# Patient Record
Sex: MALE | Race: WHITE | NOT HISPANIC OR LATINO | Employment: OTHER | URBAN - METROPOLITAN AREA
[De-identification: names, ages, dates, MRNs, and addresses within clinical notes are randomized per-mention and may not be internally consistent; named-entity substitution may affect disease eponyms.]

---

## 2019-11-26 ENCOUNTER — TELEPHONE (OUTPATIENT)
Dept: GASTROENTEROLOGY | Facility: CLINIC | Age: 59
End: 2019-11-26

## 2019-11-26 NOTE — TELEPHONE ENCOUNTER
Pt's wife Woodrow Hanks states he was seen by [facility] in the hospital; hemorrhoid bled out and he had 3 pints of blood; had colonoscopy; was d/c'd 2 days ago; had blood drawn yesterday  Primary called and said Hgb is 7 3; questioned if he should have more blood drawn?  # [phone]

## 2019-11-26 NOTE — TELEPHONE ENCOUNTER
Spoke with wife  PCP called again since she left us a message and he is being set up for transfusion tomorrow

## 2019-11-27 ENCOUNTER — TELEPHONE (OUTPATIENT)
Dept: GASTROENTEROLOGY | Facility: CLINIC | Age: 59
End: 2019-11-27

## 2019-12-04 ENCOUNTER — TELEPHONE (OUTPATIENT)
Dept: GASTROENTEROLOGY | Facility: CLINIC | Age: 59
End: 2019-12-04

## 2019-12-04 NOTE — TELEPHONE ENCOUNTER
Dr Oconnor Book wants pt to be seen sooner than feb of 2020-reschedule ov to a sooner date  Pt had combo 11/23/19 [facility]

## 2019-12-06 DIAGNOSIS — R18.8 OTHER ASCITES: Primary | ICD-10-CM

## 2019-12-06 RX ORDER — FUROSEMIDE 20 MG/1
40 TABLET ORAL DAILY
Qty: 60 TABLET | Refills: 2 | Status: SHIPPED | OUTPATIENT
Start: 2019-12-06 | End: 2020-02-10 | Stop reason: SDUPTHER

## 2019-12-06 NOTE — TELEPHONE ENCOUNTER
I confirmed that patient is on aldactone   I also confirmed the [facility] in [place] by Gloria Mccloud

## 2019-12-06 NOTE — TELEPHONE ENCOUNTER
Okay to increase to 40 mg of furosemide  My recommendation to the ER was to start him on Aldactone also some make sure he is taking that too   ER note states they gave him a prescription

## 2019-12-06 NOTE — TELEPHONE ENCOUNTER
Pt states Dr Yassine Rene called in water pills; also got from family Dr and they work better  Med is Furosemide - got 20 mg from Dr Yassine Rene & 40 mg from primary/wants 40 mg because they work better; Rx to [facility]    If ques CB# [phone]

## 2019-12-06 NOTE — TELEPHONE ENCOUNTER
Recent [facility] ER visit for ascites/paracentesis records on your desk  ER notes state discussed with you 12/1/19  Patient was discharged on Furosemide 20 mg daily, okay to increase to 40 mg dose as patient requests? Patient is scheduled for follow up with [facility] 1/6/20  Please advise

## 2020-01-03 ENCOUNTER — HOSPITAL ENCOUNTER (OUTPATIENT)
Facility: HOSPITAL | Age: 60
Setting detail: OBSERVATION
Discharge: HOME | End: 2020-01-04
Attending: EMERGENCY MEDICINE | Admitting: INTERNAL MEDICINE
Payer: MEDICARE

## 2020-01-03 DIAGNOSIS — D64.9 ANEMIA: ICD-10-CM

## 2020-01-03 DIAGNOSIS — K64.9 RECTAL VARICES: ICD-10-CM

## 2020-01-03 DIAGNOSIS — D69.6 THROMBOCYTOPENIA (HCC): ICD-10-CM

## 2020-01-03 DIAGNOSIS — K92.2 LOWER GI BLEED: Primary | ICD-10-CM

## 2020-01-03 PROBLEM — K70.9 LIVER DISEASE DUE TO ALCOHOL (HCC): Status: ACTIVE | Noted: 2020-01-03

## 2020-01-03 PROBLEM — R79.89 ELEVATED LFTS: Status: ACTIVE | Noted: 2020-01-03

## 2020-01-03 PROBLEM — I10 HTN (HYPERTENSION): Status: ACTIVE | Noted: 2020-01-03

## 2020-01-03 PROBLEM — F19.20 DRUG ABUSE AND DEPENDENCE (HCC): Status: ACTIVE | Noted: 2020-01-03

## 2020-01-03 PROBLEM — J44.9 COPD (CHRONIC OBSTRUCTIVE PULMONARY DISEASE) (HCC): Status: ACTIVE | Noted: 2020-01-03

## 2020-01-03 PROBLEM — E87.6 HYPOKALEMIA: Status: ACTIVE | Noted: 2020-01-03

## 2020-01-03 PROBLEM — D62 ACUTE BLOOD LOSS ANEMIA: Status: ACTIVE | Noted: 2020-01-03

## 2020-01-03 LAB
ABO GROUP BLD: NORMAL
ALBUMIN SERPL BCP-MCNC: 3.2 G/DL (ref 3.5–5)
ALP SERPL-CCNC: 266 U/L (ref 46–116)
ALT SERPL W P-5'-P-CCNC: 39 U/L (ref 12–78)
ANION GAP SERPL CALCULATED.3IONS-SCNC: 9 MMOL/L (ref 4–13)
APTT PPP: 35 SECONDS (ref 23–37)
AST SERPL W P-5'-P-CCNC: 56 U/L (ref 5–45)
BASOPHILS # BLD AUTO: 0.01 THOUSANDS/ΜL (ref 0–0.1)
BASOPHILS NFR BLD AUTO: 0 % (ref 0–1)
BILIRUB SERPL-MCNC: 0.7 MG/DL (ref 0.2–1)
BLD GP AB SCN SERPL QL: NEGATIVE
BUN SERPL-MCNC: 6 MG/DL (ref 5–25)
CALCIUM SERPL-MCNC: 8.1 MG/DL (ref 8.3–10.1)
CHLORIDE SERPL-SCNC: 101 MMOL/L (ref 100–108)
CO2 SERPL-SCNC: 27 MMOL/L (ref 21–32)
CREAT SERPL-MCNC: 0.89 MG/DL (ref 0.6–1.3)
EOSINOPHIL # BLD AUTO: 0.07 THOUSAND/ΜL (ref 0–0.61)
EOSINOPHIL NFR BLD AUTO: 2 % (ref 0–6)
ERYTHROCYTE [DISTWIDTH] IN BLOOD BY AUTOMATED COUNT: 14 % (ref 11.6–15.1)
GFR SERPL CREATININE-BSD FRML MDRD: 94 ML/MIN/1.73SQ M
GLUCOSE SERPL-MCNC: 157 MG/DL (ref 65–140)
HCT VFR BLD AUTO: 34.9 % (ref 36.5–49.3)
HGB BLD-MCNC: 11.5 G/DL (ref 12–17)
IMM GRANULOCYTES # BLD AUTO: 0.01 THOUSAND/UL (ref 0–0.2)
IMM GRANULOCYTES NFR BLD AUTO: 0 % (ref 0–2)
INR PPP: 1.25 (ref 0.84–1.19)
LIPASE SERPL-CCNC: 133 U/L (ref 73–393)
LYMPHOCYTES # BLD AUTO: 0.62 THOUSANDS/ΜL (ref 0.6–4.47)
LYMPHOCYTES NFR BLD AUTO: 19 % (ref 14–44)
MCH RBC QN AUTO: 30.4 PG (ref 26.8–34.3)
MCHC RBC AUTO-ENTMCNC: 33 G/DL (ref 31.4–37.4)
MCV RBC AUTO: 92 FL (ref 82–98)
MONOCYTES # BLD AUTO: 0.23 THOUSAND/ΜL (ref 0.17–1.22)
MONOCYTES NFR BLD AUTO: 7 % (ref 4–12)
NEUTROPHILS # BLD AUTO: 2.28 THOUSANDS/ΜL (ref 1.85–7.62)
NEUTS SEG NFR BLD AUTO: 72 % (ref 43–75)
NRBC BLD AUTO-RTO: 0 /100 WBCS
PLATELET # BLD AUTO: 56 THOUSANDS/UL (ref 149–390)
PMV BLD AUTO: 11.4 FL (ref 8.9–12.7)
POTASSIUM SERPL-SCNC: 3.2 MMOL/L (ref 3.5–5.3)
PROT SERPL-MCNC: 7.4 G/DL (ref 6.4–8.2)
PROTHROMBIN TIME: 15.4 SECONDS (ref 11.6–14.5)
RBC # BLD AUTO: 3.78 MILLION/UL (ref 3.88–5.62)
RH BLD: POSITIVE
SODIUM SERPL-SCNC: 137 MMOL/L (ref 136–145)
SPECIMEN EXPIRATION DATE: NORMAL
WBC # BLD AUTO: 3.22 THOUSAND/UL (ref 4.31–10.16)

## 2020-01-03 PROCEDURE — 36415 COLL VENOUS BLD VENIPUNCTURE: CPT

## 2020-01-03 PROCEDURE — 99285 EMERGENCY DEPT VISIT HI MDM: CPT | Performed by: EMERGENCY MEDICINE

## 2020-01-03 PROCEDURE — 82272 OCCULT BLD FECES 1-3 TESTS: CPT

## 2020-01-03 PROCEDURE — 86901 BLOOD TYPING SEROLOGIC RH(D): CPT | Performed by: EMERGENCY MEDICINE

## 2020-01-03 PROCEDURE — 86850 RBC ANTIBODY SCREEN: CPT | Performed by: EMERGENCY MEDICINE

## 2020-01-03 PROCEDURE — 80053 COMPREHEN METABOLIC PANEL: CPT | Performed by: EMERGENCY MEDICINE

## 2020-01-03 PROCEDURE — 86900 BLOOD TYPING SEROLOGIC ABO: CPT | Performed by: EMERGENCY MEDICINE

## 2020-01-03 PROCEDURE — 85025 COMPLETE CBC W/AUTO DIFF WBC: CPT | Performed by: EMERGENCY MEDICINE

## 2020-01-03 PROCEDURE — 83690 ASSAY OF LIPASE: CPT | Performed by: EMERGENCY MEDICINE

## 2020-01-03 PROCEDURE — 99236 HOSP IP/OBS SAME DATE HI 85: CPT | Performed by: NURSE PRACTITIONER

## 2020-01-03 PROCEDURE — 85610 PROTHROMBIN TIME: CPT

## 2020-01-03 PROCEDURE — C9113 INJ PANTOPRAZOLE SODIUM, VIA: HCPCS | Performed by: NURSE PRACTITIONER

## 2020-01-03 PROCEDURE — 99285 EMERGENCY DEPT VISIT HI MDM: CPT

## 2020-01-03 PROCEDURE — 93005 ELECTROCARDIOGRAM TRACING: CPT

## 2020-01-03 PROCEDURE — 85730 THROMBOPLASTIN TIME PARTIAL: CPT

## 2020-01-03 RX ORDER — LIDOCAINE HYDROCHLORIDE AND EPINEPHRINE 10; 10 MG/ML; UG/ML
10 INJECTION, SOLUTION INFILTRATION; PERINEURAL ONCE
Status: DISCONTINUED | OUTPATIENT
Start: 2020-01-03 | End: 2020-01-04 | Stop reason: HOSPADM

## 2020-01-03 RX ORDER — BUPRENORPHINE AND NALOXONE 8; 2 MG/1; MG/1
1 FILM, SOLUBLE BUCCAL; SUBLINGUAL DAILY
Status: DISCONTINUED | OUTPATIENT
Start: 2020-01-04 | End: 2020-01-04 | Stop reason: HOSPADM

## 2020-01-03 RX ORDER — TRANEXAMIC ACID 100 MG/ML
500 INJECTION, SOLUTION INTRAVENOUS ONCE
Status: DISCONTINUED | OUTPATIENT
Start: 2020-01-03 | End: 2020-01-03

## 2020-01-03 RX ORDER — NICOTINE 21 MG/24HR
1 PATCH, TRANSDERMAL 24 HOURS TRANSDERMAL DAILY
Status: DISCONTINUED | OUTPATIENT
Start: 2020-01-04 | End: 2020-01-04 | Stop reason: HOSPADM

## 2020-01-03 RX ORDER — ACETAMINOPHEN 325 MG/1
650 TABLET ORAL EVERY 8 HOURS PRN
Status: DISCONTINUED | OUTPATIENT
Start: 2020-01-03 | End: 2020-01-04 | Stop reason: HOSPADM

## 2020-01-03 RX ORDER — METHOCARBAMOL 500 MG/1
750 TABLET, FILM COATED ORAL 2 TIMES DAILY
Status: DISCONTINUED | OUTPATIENT
Start: 2020-01-03 | End: 2020-01-03

## 2020-01-03 RX ORDER — ALBUTEROL SULFATE 2.5 MG/3ML
2.5 SOLUTION RESPIRATORY (INHALATION) EVERY 6 HOURS PRN
Status: DISCONTINUED | OUTPATIENT
Start: 2020-01-03 | End: 2020-01-04 | Stop reason: HOSPADM

## 2020-01-03 RX ORDER — POTASSIUM CHLORIDE 20 MEQ/1
40 TABLET, EXTENDED RELEASE ORAL
Status: COMPLETED | OUTPATIENT
Start: 2020-01-03 | End: 2020-01-04

## 2020-01-03 RX ORDER — BUPRENORPHINE HYDROCHLORIDE AND NALOXONE HYDROCHLORIDE DIHYDRATE 8; 2 MG/1; MG/1
1 TABLET SUBLINGUAL
COMMUNITY
End: 2020-04-03 | Stop reason: ALTCHOICE

## 2020-01-03 RX ORDER — PANTOPRAZOLE SODIUM 40 MG/1
40 INJECTION, POWDER, FOR SOLUTION INTRAVENOUS EVERY 12 HOURS SCHEDULED
Status: DISCONTINUED | OUTPATIENT
Start: 2020-01-03 | End: 2020-01-04 | Stop reason: HOSPADM

## 2020-01-03 RX ORDER — ATORVASTATIN CALCIUM 40 MG/1
40 TABLET, FILM COATED ORAL DAILY
Status: DISCONTINUED | OUTPATIENT
Start: 2020-01-04 | End: 2020-01-03

## 2020-01-03 RX ORDER — ALBUTEROL SULFATE 90 UG/1
2 AEROSOL, METERED RESPIRATORY (INHALATION) AS NEEDED
COMMUNITY

## 2020-01-03 RX ADMIN — PANTOPRAZOLE SODIUM 40 MG: 40 INJECTION, POWDER, FOR SOLUTION INTRAVENOUS at 23:54

## 2020-01-03 RX ADMIN — POTASSIUM CHLORIDE 40 MEQ: 1500 TABLET, EXTENDED RELEASE ORAL at 23:53

## 2020-01-04 VITALS
DIASTOLIC BLOOD PRESSURE: 56 MMHG | HEIGHT: 72 IN | WEIGHT: 165 LBS | BODY MASS INDEX: 22.35 KG/M2 | OXYGEN SATURATION: 91 % | TEMPERATURE: 98 F | HEART RATE: 66 BPM | SYSTOLIC BLOOD PRESSURE: 107 MMHG | RESPIRATION RATE: 18 BRPM

## 2020-01-04 LAB
ALBUMIN SERPL BCP-MCNC: 2.5 G/DL (ref 3.5–5)
ALP SERPL-CCNC: 211 U/L (ref 46–116)
ALT SERPL W P-5'-P-CCNC: 30 U/L (ref 12–78)
ANION GAP SERPL CALCULATED.3IONS-SCNC: 6 MMOL/L (ref 4–13)
AST SERPL W P-5'-P-CCNC: 36 U/L (ref 5–45)
ATRIAL RATE: 71 BPM
ATRIAL RATE: 82 BPM
BILIRUB SERPL-MCNC: 0.5 MG/DL (ref 0.2–1)
BUN SERPL-MCNC: 8 MG/DL (ref 5–25)
CALCIUM SERPL-MCNC: 8 MG/DL (ref 8.3–10.1)
CHLORIDE SERPL-SCNC: 108 MMOL/L (ref 100–108)
CO2 SERPL-SCNC: 27 MMOL/L (ref 21–32)
CREAT SERPL-MCNC: 0.76 MG/DL (ref 0.6–1.3)
ERYTHROCYTE [DISTWIDTH] IN BLOOD BY AUTOMATED COUNT: 14.1 % (ref 11.6–15.1)
GFR SERPL CREATININE-BSD FRML MDRD: 101 ML/MIN/1.73SQ M
GLUCOSE P FAST SERPL-MCNC: 105 MG/DL (ref 65–99)
GLUCOSE SERPL-MCNC: 105 MG/DL (ref 65–140)
HCT VFR BLD AUTO: 31.8 % (ref 36.5–49.3)
HGB BLD-MCNC: 10.1 G/DL (ref 12–17)
HGB BLD-MCNC: 10.5 G/DL (ref 12–17)
MAGNESIUM SERPL-MCNC: 1.7 MG/DL (ref 1.6–2.6)
MCH RBC QN AUTO: 30.6 PG (ref 26.8–34.3)
MCHC RBC AUTO-ENTMCNC: 33 G/DL (ref 31.4–37.4)
MCV RBC AUTO: 93 FL (ref 82–98)
P AXIS: 63 DEGREES
P AXIS: 69 DEGREES
PLATELET # BLD AUTO: 46 THOUSANDS/UL (ref 149–390)
PMV BLD AUTO: 10 FL (ref 8.9–12.7)
POTASSIUM SERPL-SCNC: 3.8 MMOL/L (ref 3.5–5.3)
PR INTERVAL: 172 MS
PR INTERVAL: 184 MS
PROT SERPL-MCNC: 6.1 G/DL (ref 6.4–8.2)
QRS AXIS: 64 DEGREES
QRS AXIS: 72 DEGREES
QRSD INTERVAL: 90 MS
QRSD INTERVAL: 94 MS
QT INTERVAL: 410 MS
QT INTERVAL: 434 MS
QTC INTERVAL: 471 MS
QTC INTERVAL: 479 MS
RBC # BLD AUTO: 3.43 MILLION/UL (ref 3.88–5.62)
SODIUM SERPL-SCNC: 141 MMOL/L (ref 136–145)
T WAVE AXIS: 2 DEGREES
T WAVE AXIS: 37 DEGREES
VENTRICULAR RATE: 71 BPM
VENTRICULAR RATE: 82 BPM
WBC # BLD AUTO: 1.82 THOUSAND/UL (ref 4.31–10.16)

## 2020-01-04 PROCEDURE — C9113 INJ PANTOPRAZOLE SODIUM, VIA: HCPCS | Performed by: NURSE PRACTITIONER

## 2020-01-04 PROCEDURE — 93010 ELECTROCARDIOGRAM REPORT: CPT | Performed by: INTERNAL MEDICINE

## 2020-01-04 PROCEDURE — 80053 COMPREHEN METABOLIC PANEL: CPT | Performed by: NURSE PRACTITIONER

## 2020-01-04 PROCEDURE — 99215 OFFICE O/P EST HI 40 MIN: CPT | Performed by: INTERNAL MEDICINE

## 2020-01-04 PROCEDURE — 83735 ASSAY OF MAGNESIUM: CPT | Performed by: NURSE PRACTITIONER

## 2020-01-04 PROCEDURE — 99217 PR OBSERVATION CARE DISCHARGE MANAGEMENT: CPT | Performed by: INTERNAL MEDICINE

## 2020-01-04 PROCEDURE — 93005 ELECTROCARDIOGRAM TRACING: CPT

## 2020-01-04 PROCEDURE — 85027 COMPLETE CBC AUTOMATED: CPT | Performed by: NURSE PRACTITIONER

## 2020-01-04 PROCEDURE — 85018 HEMOGLOBIN: CPT | Performed by: NURSE PRACTITIONER

## 2020-01-04 RX ADMIN — BUPRENORPHINE AND NALOXONE 1 FILM: 8; 2 FILM BUCCAL; SUBLINGUAL at 08:24

## 2020-01-04 RX ADMIN — POTASSIUM CHLORIDE 40 MEQ: 1500 TABLET, EXTENDED RELEASE ORAL at 01:49

## 2020-01-04 RX ADMIN — PANTOPRAZOLE SODIUM 40 MG: 40 INJECTION, POWDER, FOR SOLUTION INTRAVENOUS at 08:24

## 2020-01-04 NOTE — H&P
H&P- Manuel Lee 1960, 63 y o  male MRN: [id number]    Unit/Bed#: -01 Encounter: [id number]    Primary Care Provider: Jesse Mcdermott DO   Date and time admitted to hospital: 1/3/2020  8:00 PM    * GIB (gastrointestinal bleeding)  Assessment & Plan  · Patient presents with BRB NM x 1 this evening, previous hx of lower GIB last month, where he was evaluated by GI and expresses having "something cut off"  Previous records are at [facility], unable to view complete event from last month  · Patient does have history of hemorrhoids and is known to the GI team  · GI was consulted while the patient was in the ED, will evaluate in the AM  · Check Hgb q8hrs, transfuse for Hgb < 7 0   · IV Protonix BID   · Maintain NPO after 0000 for possible procedure   · INR was 1 25 on admission, PT 15 4    Liver disease due to alcohol [facility])  Assessment & Plan  · Patient was an alcohol abuser and Percocet user  · This admission LFTs appear to be around baseline when compared to LFTs in 2015  · He expresses not drinking for over 2 years now, however, he did just get out of rehab for addiction to Percocet  · Trend LFTs every other day    HTN (hypertension)  Assessment & Plan  · Patient states he is not on anything other than diuretics  · Will hold Lasix in the setting of possible GI bleed and trend blood pressure  · Current systolic -395O    COPD (chronic obstructive pulmonary disease) ([facility] [address] [address] )  Assessment & Plan  · Patient states he does not see a pulmonary doctor, his PCP gave him albuterol p r n  · Will order albuterol inhalation q 6 hours p r n   For wheezing and/or shortness of breath  · Currently patient is on room air with O2 saturation greater than 95%    Thrombocytopenia (HCC)  Assessment & Plan  · Chronic, likely related to liver disease  · Platelets this admission were 56, as per lab results four years ago platelets were 64  · Trend platelets daily  · Transfuse for platelets less than 40 while actively bleeding    Hypokalemia  Assessment & Plan  · Potassium was 3 2 on admission, will give 40 mEq  of p o  X2, for goal potassium of greater than 4 0    Drug abuse and dependence ([address] )  Assessment & Plan  · Patient expresses recently completing it appear med program for Percocet abuse  · He is on Suboxone daily, will restart as per home regimen  · Avoid use of Percocets    Acute blood loss anemia  Assessment & Plan  · Likely in the setting of GI bleed  · Hemoglobin 11 5 on admission  · Trend hemoglobin q 8 hours  · Transfuse for hemoglobin less than 7 0    Elevated LFTs  Assessment & Plan  · Interventions as above       History and Physical - [facility] Internal Medicine    Patient Information: Bin Mercedes 63 y o  male MRN: [id number]  Unit/Bed#: -01 Encounter: [id number]  Admitting Physician: Luis Duarte  PCP: Sade Lyman DO  Date of Admission:  01/03/20    Assessment/Plan:    Hospital Problem List:     Principal Problem:    GIB (gastrointestinal bleeding)  Active Problems:    Liver disease due to alcohol (HCC)    HTN (hypertension)    COPD (chronic obstructive pulmonary disease) (HCC)    Thrombocytopenia (HCC)    Elevated LFTs    Acute blood loss anemia    Drug abuse and dependence ([address] )    Hypokalemia    VTE Prophylaxis: Pharmacologic VTE Prophylaxis contraindicated due to GIB  / sequential compression device   Code Status: FC  POLST: There is no POLST form on file for this patient (pre-hospital)    Anticipated Length of Stay:  Patient will be admitted on an Observation basis with an anticipated length of stay of 1 midnights  Justification for Hospital Stay: possible GIB    Total Time for Visit, including Counseling / Coordination of Care: 20 minutes  Greater than 50% of this total time spent on direct patient counseling and coordination of care      Chief Complaint:   BRB SC    History of Present Illness:    Bin Mercedes is a 63 y o  male who presents with PMH Liver diease due to ETOH & drug abuse recently post rehab, HTN, COPD, Thrombocytopenia & GIB  The patient was recently admitted to [facility] and evaluated by GI for a lower GI bleed  He is somewhat a limited historian and records from [facility] are limited  Patient expresses losing 4 pints of blood at that time and having a colonoscopy where "they cut something off"  This evening he expresses feeling like he had to go to the bathroom and having a BM, when he looked in the toilet he saw BRB  He is known to have hemorrhoids however, he was worried due to his event last month and came to the ED  His hemoglobin was 11 5 on arrival and he expressed only having 1 pass of bright red blood from his rectum  He was made NPO, GI was consulted and will evaluate patient in the morning  He has serial hemoglobins scheduled  Review of Systems:    Review of Systems   Constitutional: Negative  HENT: Negative  Eyes: Negative  Respiratory: Negative  Cardiovascular: Negative  Gastrointestinal: Positive for blood in stool  Negative for constipation and rectal pain  Endocrine: Negative  Genitourinary: Negative  Musculoskeletal: Negative  Skin: Negative  Allergic/Immunologic: Negative  Neurological: Negative  Hematological: Negative  Psychiatric/Behavioral: Negative  Past Medical and Surgical History:     Past Medical History:   Diagnosis Date    Cardiac disease     Chronic pain     back and neck    COPD (chronic obstructive pulmonary disease) ([address] )     Hypertension        Past Surgical History:   Procedure Laterality Date    BACK SURGERY      CERVICAL FUSION      CHOLECYSTECTOMY      HERNIA REPAIR      KNEE SURGERY      NECK SURGERY         Meds/Allergies:    Prior to Admission medications    Medication Sig Start Date End Date Taking?  Authorizing Provider   albuterol (PROVENTIL HFA,VENTOLIN HFA) 90 mcg/act inhaler Inhale 2 puffs as needed for shortness of breath   Yes Historical Provider, MD buprenorphine-naloxone (SUBOXONE) 8-2 mg per SL tablet Place 1 tablet under the tongue   Yes Historical Provider, MD   aspirin 81 MG tablet Take 81 mg by mouth daily  Historical Provider, MD   atorvastatin (LIPITOR) 40 mg tablet Take 40 mg by mouth daily  Historical Provider, MD   furosemide (LASIX) 20 mg tablet Take 2 tablets (40 mg total) by mouth daily 12/6/19   Ashlee Peña MD   HYDROcodone-acetaminophen [facility]) 5-325 mg per tablet Take 1 tablet by mouth every 6 (six) hours as needed for moderate pain  Max Daily Amount: 4 tablets 6/28/16   Ino Owens MD   isosorbide mononitrate (IMDUR) 60 mg 24 hr tablet Take 60 mg by mouth daily  Historical Provider, MD   methocarbamol (ROBAXIN) 750 mg tablet Take 1 tablet (750 mg total) by mouth 2 (two) times a day  Patient not taking: Reported on 1/3/2020 6/28/16   Ino Owens MD   metoprolol tartrate (LOPRESSOR) 25 mg tablet Take 25 mg by mouth 2 (two) times a day  Historical Provider, MD     I have reviewed home medications with patient personally  Allergies: Allergies   Allergen Reactions    Flexeril [Cyclobenzaprine] Other (See Comments)     Hallucination      Morphine And Related Hives    Naprosyn [Naproxen] GI Intolerance    Ultram [Tramadol] GI Intolerance       Social History:     Marital Status:      Substance Use History:   Social History     Substance and Sexual Activity   Alcohol Use No     Social History     Tobacco Use   Smoking Status Current Every Day Smoker   Smokeless Tobacco Never Used     Social History     Substance and Sexual Activity   Drug Use No       Family History:    History reviewed  No pertinent family history      Physical Exam:     Vitals:   Blood Pressure: 148/70 (01/03/20 2236)  Pulse: 72 (01/03/20 2236)  Temperature: 98 °F (36 7 °C) (01/03/20 2236)  Temp Source: Temporal (01/03/20 2003)  Respirations: 18 (01/03/20 2236)  Weight - Scale: 74 8 kg (165 lb) (01/03/20 2003)  SpO2: 95 % (01/03/20 2236)    Physical Exam   Constitutional: He is oriented to person, place, and time  He is cooperative  HENT:   Head: Normocephalic and atraumatic  Eyes: Pupils are equal, round, and reactive to light  Neck: No tracheal deviation present  Cardiovascular: Normal rate, regular rhythm, S1 normal and S2 normal    Pulses:       Radial pulses are 2+ on the right side, and 2+ on the left side  Dorsalis pedis pulses are 2+ on the right side, and 2+ on the left side  Pulmonary/Chest: Effort normal  No accessory muscle usage  No respiratory distress  He has no wheezes  Abdominal: Soft  Bowel sounds are normal  He exhibits distension  There is no tenderness  Musculoskeletal: Normal range of motion  Lymphadenopathy:     He has no cervical adenopathy  Neurological: He is alert and oriented to person, place, and time  Skin: Skin is warm and dry  Nails show no clubbing  Psychiatric: His speech is normal  Cognition and memory are normal        Additional Data:     Lab Results: I have personally reviewed pertinent reports  Results from last 7 days   Lab Units 01/03/20 2005   WBC Thousand/uL 3 22*   HEMOGLOBIN g/dL 11 5*   HEMATOCRIT % 34 9*   PLATELETS Thousands/uL 56*   NEUTROS PCT % 72   LYMPHS PCT % 19   MONOS PCT % 7   EOS PCT % 2     Results from last 7 days   Lab Units 01/03/20 2005   POTASSIUM mmol/L 3 2*   CHLORIDE mmol/L 101   CO2 mmol/L 27   BUN mg/dL 6   CREATININE mg/dL 0 89   CALCIUM mg/dL 8 1*   ALK PHOS U/L 266*   ALT U/L 39   AST U/L 56*     Results from last 7 days   Lab Units 01/03/20 2005   INR  1 25*       Imaging: I have personally reviewed pertinent reports  No results found  EKG, Pathology, and Other Studies Reviewed on Admission:   · EKG: SR     Allscripts / Epic Records Reviewed: Yes     ** Please Note: This note has been constructed using a voice recognition system   **

## 2020-01-04 NOTE — ASSESSMENT & PLAN NOTE
· Chronic, likely related to liver disease  · Platelets this admission were 56, as per lab results four years ago platelets were 64  · Trend platelets daily  · Patient no longer bleeding actively, platelets 46 this morning

## 2020-01-04 NOTE — ASSESSMENT & PLAN NOTE
· Patient presents with BRB OR x 1 this evening, previous hx of lower GIB last month, where he was evaluated by GI and expresses having "something cut off"  Previous records are at [facility], unable to view complete event from last month    · Patient does have history of hemorrhoids and is known to the GI team  · GI was consulted while the patient was in the ED, will evaluate in the AM  · Check Hgb q8hrs, transfuse for Hgb < 7 0   · IV Protonix BID   · Maintain NPO after 0000 for possible procedure   · INR was 1 25 on admission, PT 15 4

## 2020-01-04 NOTE — ASSESSMENT & PLAN NOTE
· Patient was an alcohol abuser and Percocet user  · Currently on Suboxone  · This admission LFTs appear to be around baseline when compared to LFTs in 2015  · He expresses not drinking for over 2 years now, however, he did just get out of rehab for addiction to Percocet  · Trend LFTs every other day

## 2020-01-04 NOTE — ASSESSMENT & PLAN NOTE
· Patient expresses recently completing it appear med program for Percocet abuse  · He is on Suboxone daily, will restart as per home regimen  · Avoid use of Percocets

## 2020-01-04 NOTE — UTILIZATION REVIEW
Initial Clinical Review    Admission: Date/Time/Statement:  1/3 @ 2128 to Sosa Rodriguez This Encounter   Procedures    Place in Observation (expected length of stay for this patient is less than two midnights)     Standing Status:   Standing     Number of Occurrences:   1     Order Specific Question:   Admitting Physician     Answer:   Zafar Pichardo     Order Specific Question:   Level of Care     Answer:   Med Surg [16]     ED Arrival Information     Expected Arrival Acuity Means of Arrival Escorted By Service Admission Type    - 1/3/2020 19:55 Urgent Wheelchair Spouse General Medicine Urgent    [address] [facility]        Chief Complaint   Patient presents with    Rectal Bleeding     pt had hemoroid rupture and had hemorroids sutured  Tonight pt felt warmth and notice his was bleeding     Assessment/Plan:  64 yo male presents to ED from home with rectal bleeding  PMH Liver diease due to ETOH & drug abuse recently post rehab, HTN, COPD, Thrombocytopenia & GIB  Recent admit to [facility] for GI Bleed  Limited history available  This evening he went to [facility] and noted BRB  Admitted to OBS with:    GIB (gastrointestinal bleeding)  Assessment & Plan  · Patient presents with BRB OK x 1 this evening, previous hx of lower GIB last month, where he was evaluated by GI and expresses having "something cut off"  Previous records are at [facility], unable to view complete event from last month    · Patient does have history of hemorrhoids and is known to the GI team  · GI was consulted while the patient was in the ED, will evaluate in the AM  · Check Hgb q8hrs, transfuse for Hgb < 7 0   · IV Protonix BID   · Maintain NPO after 0000 for possible procedure   · INR was 1 25 on admission, PT 15 4     Liver disease due to alcohol [facility])  Assessment & Plan  · Patient was an alcohol abuser and Percocet user  · This admission LFTs appear to be around baseline when compared to LFTs in 2015  · He expresses not drinking for over 2 years now, however, he did just get out of rehab for addiction to Percocet  · Trend LFTs every other day     HTN (hypertension)  Assessment & Plan  · Patient states he is not on anything other than diuretics  · Will hold Lasix in the setting of possible GI bleed and trend blood pressure  · Current systolic -233M     COPD (chronic obstructive pulmonary disease) ([facility] [address] )  Assessment & Plan  · Patient states he does not see a pulmonary doctor, his PCP gave him albuterol p r n  · Will order albuterol inhalation q 6 hours p r n  For wheezing and/or shortness of breath  · Currently patient is on room air with O2 saturation greater than 95%     Thrombocytopenia (HCC)  Assessment & Plan  · Chronic, likely related to liver disease  · Platelets this admission were 56, as per lab results four years ago platelets were 64  · Trend platelets daily  · Transfuse for platelets less than 40 while actively bleeding     Hypokalemia  Assessment & Plan  · Potassium was 3 2 on admission, will give 40 mEq  of p o  X2, for goal potassium of greater than 4 0     Drug abuse and dependence ([facility] )  Assessment & Plan  · Patient expresses recently completing it appear med program for Percocet abuse  · He is on Suboxone daily, will restart as per home regimen  · Avoid use of Percocets     Acute blood loss anemia  Assessment & Plan  · Likely in the setting of GI bleed  · Hemoglobin 11 5 on admission  · Trend hemoglobin q 8 hours  · Transfuse for hemoglobin less than 7 0     Elevated LFTs  Assessment & Plan  · Interventions as above     Anticipated Length of Stay:  Patient will be admitted on an Observation basis with an anticipated length of stay of 1 midnights     Justification for Hospital Stay: possible GIB    ED Triage Vitals [01/03/20 2003]   Temperature Pulse Respirations Blood Pressure SpO2   98 9 °F (37 2 °C) 92 18 (!) 172/85 97 %      Temp Source Heart Rate Source Patient Position - Orthostatic VS BP Location FiO2 (%)   Temporal Monitor Sitting Right arm --      Pain Score       No Pain        Wt Readings from Last 1 Encounters:   01/03/20 74 8 kg (165 lb)     Additional Vital Signs:   01/04/20 07:30:10  98 °F (36 7 °C)  66  18  107/56  73  91 %     01/04/20 00:03:15  97 9 °F (36 6 °C)  69  15  107/50  69  93 %     01/03/20 22:36:14  98 °F (36 7 °C)  72  18  148/70  96  95 %     01/03/20 2130    74  24Abnormal   154/81    97 % None (Room air) Sitting       Pertinent Labs/Diagnostic Test Results:   Results from last 7 days   Lab Units 01/04/20  0509 01/04/20  0149 01/03/20 2005   WBC Thousand/uL 1 82*  --  3 22*   HEMOGLOBIN g/dL 10 5* 10 1* 11 5*   HEMATOCRIT % 31 8*  --  34 9*   PLATELETS Thousands/uL 46*  --  56*   NEUTROS ABS Thousands/µL  --   --  2 28     Results from last 7 days   Lab Units 01/04/20  0509 01/03/20 2005   SODIUM mmol/L 141 137   POTASSIUM mmol/L 3 8 3 2*   CHLORIDE mmol/L 108 101   CO2 mmol/L 27 27   ANION GAP mmol/L 6 9   BUN mg/dL 8 6   CREATININE mg/dL 0 76 0 89   EGFR ml/min/1 73sq m 101 94   CALCIUM mg/dL 8 0* 8 1*   MAGNESIUM mg/dL 1 7  --      Results from last 7 days   Lab Units 01/04/20  0509 01/03/20 2005   AST U/L 36 56*   ALT U/L 30 39   ALK PHOS U/L 211* 266*   TOTAL PROTEIN g/dL 6 1* 7 4   ALBUMIN g/dL 2 5* 3 2*   TOTAL BILIRUBIN mg/dL 0 50 0 70     Results from last 7 days   Lab Units 01/04/20  0509 01/03/20 2005   GLUCOSE RANDOM mg/dL 105 157*     Results from last 7 days   Lab Units 01/03/20 2005   PROTIME seconds 15 4*   INR  1 25*   PTT seconds 35     Results from last 7 days   Lab Units 01/03/20 2005   LIPASE u/L 133     1/3 EKG:  NSR    ED Treatment:   Medication Administration from 01/03/2020 1955 to 01/03/2020 2218       Date/Time Order Dose Route Action     01/03/2020 2049 tranexamic acid 100mg/mL (for epistaxis) 500 mg 0 mg Nasal Hold     01/03/2020 2049 lidocaine-epinephrine (XYLOCAINE/EPINEPHRINE) 1 %-1:100,000 injection 10 mL 0 mL Infiltration Hold Past Medical History:   Diagnosis Date    Cardiac disease     Chronic pain     back and neck    COPD (chronic obstructive pulmonary disease) (HCC)     Hypertension      Admitting Diagnosis: Lower GI bleed [K92 2]  Rectal bleed [K62 5]     Age/Sex: 63 y o  male  Admission Orders:  Scheduled Medications:  Medications:  buprenorphine-naloxone 1 Film Sublingual Daily   influenza vaccine 0 5 mL Intramuscular Once   lidocaine-epinephrine 10 mL Infiltration Once   nicotine 1 patch Transdermal Daily   pantoprazole 40 mg Intravenous Q12H [address]     Continuous IV Infusions:     PRN Meds:  acetaminophen 650 mg Oral Q8H PRN   albuterol 2 5 mg Nebulization Q6H PRN       IP CONSULT TO GASTROENTEROLOGY  IP CONSULT TO CASE MANAGEMENT  [facility]      Network Utilization Review Department  [email or web address]  ATTENTION: Please call with any questions or concerns to [phone] and carefully listen to the prompts so that you are directed to the right person  All voicemails are confidential   Nanci Priya all requests for admission clinical reviews, approved or denied determinations and any other requests to dedicated fax number below belonging to the campus where the patient is receiving treatment   List of dedicated fax numbers for the Facilities:  [address] [place] [address] DENIALS (Administrative/Medical Necessity) [phone]   [address] [place] [address] (Maternity/NICU/Pediatrics) [phone]   [facility] [phone]   [address] [facility] [phone]   [address] [place] [address] [phone]   [address] [facility]  [phone]   [address] [facility] [address] [facility] [phone]   [facility]  [phone]   [address] [facility] [address]  [address] [facility] [address] [facility] [phone]

## 2020-01-04 NOTE — PLAN OF CARE
Problem: NEUROSENSORY - ADULT  Goal: Achieves maximal functionality and self care  Description  INTERVENTIONS  - Monitor swallowing and airway patency with patient fatigue and changes in neurological status  - Encourage and assist patient to increase activity and self care     - Encourage visually impaired, hearing impaired and aphasic patients to use assistive/communication devices  Outcome: Progressing     Problem: CARDIOVASCULAR - ADULT  Goal: Maintains optimal cardiac output and hemodynamic stability  Description  INTERVENTIONS:  - Monitor I/O, vital signs and rhythm  - Monitor for S/S and trends of decreased cardiac output  - Administer and titrate ordered vasoactive medications to optimize hemodynamic stability  - Assess quality of pulses, skin color and temperature  - Assess for signs of decreased coronary artery perfusion  - Instruct patient to report change in severity of symptoms  Outcome: Progressing  Goal: Absence of cardiac dysrhythmias or at baseline rhythm  Description  INTERVENTIONS:  - Continuous cardiac monitoring, vital signs, obtain 12 lead EKG if ordered  - Administer antiarrhythmic and heart rate control medications as ordered  - Monitor electrolytes and administer replacement therapy as ordered  Outcome: Progressing     Problem: METABOLIC, FLUID AND ELECTROLYTES - ADULT  Goal: Electrolytes maintained within normal limits  Description  INTERVENTIONS:  - Monitor labs and assess patient for signs and symptoms of electrolyte imbalances  - Administer electrolyte replacement as ordered  - Monitor response to electrolyte replacements, including repeat lab results as appropriate  - Instruct patient on fluid and nutrition as appropriate  Outcome: Progressing     Problem: HEMATOLOGIC - ADULT  Goal: Maintains hematologic stability  Description  INTERVENTIONS  - Assess for signs and symptoms of bleeding or hemorrhage  - Monitor labs  - Administer supportive blood products/factors as ordered and appropriate  Outcome: Progressing

## 2020-01-04 NOTE — ASSESSMENT & PLAN NOTE
· Potassium was 3 2 on admission, will give 40 mEq  of p o  X2, for goal potassium of greater than 4 0  · Potassium 3 8 this morning

## 2020-01-04 NOTE — ASSESSMENT & PLAN NOTE
· Patient states he does not see a pulmonary doctor, his PCP gave him albuterol p r n  · Will order albuterol inhalation q 6 hours p r n   For wheezing and/or shortness of breath  · Currently patient is on room air with O2 saturation greater than 95%

## 2020-01-04 NOTE — DISCHARGE SUMMARY
Discharge- [place] 1960, 63 y o  male MRN: [id number]    Unit/Bed#: -01 Encounter: [id number]    Primary Care Provider: Kelly Haley DO   Date and time admitted to hospital: 1/3/2020  8:00 PM        Thrombocytopenia ([facility] )  Assessment & Plan  · Chronic, likely related to liver disease  · Platelets this admission were 56, as per lab results four years ago platelets were 64  · Trend platelets daily  · Patient no longer bleeding actively, platelets 46 this morning    Hypokalemia  Assessment & Plan  · Potassium was 3 2 on admission, will give 40 mEq  of p o  X2, for goal potassium of greater than 4 0  · Potassium 3 8 this morning    Drug abuse and dependence ([facility] )  Assessment & Plan  · Currently on Suboxone  · No longer on Percocet or hydrocodone    COPD (chronic obstructive pulmonary disease) ([place] [address] )  Assessment & Plan  · Patient states he does not see a pulmonary doctor, his PCP gave him albuterol p r n  · Will order albuterol inhalation q 6 hours p r n   For wheezing and/or shortness of breath  · Currently patient is on room air with O2 saturation greater than 95%    Liver disease due to alcohol [facility])  Assessment & Plan  · Patient was an alcohol abuser and Percocet user  · Currently on Suboxone  · This admission LFTs appear to be around baseline when compared to LFTs in 2015  · He expresses not drinking for over 2 years now, however, he did just get out of rehab for addiction to Percocet  · Trend LFTs every other day    Acute blood loss anemia  Assessment & Plan  · Likely in the setting of GI bleed  · Hemoglobin 11 5 on admission, now 10 5  · Trend hemoglobin q 8 hours  · Transfuse for hemoglobin less than 7 0    HTN (hypertension)  Assessment & Plan  · BP stable  · Resume home medications on discharge  · Per GI okay to go home today     Elevated LFTs  Assessment & Plan  · Interventions as above     * GIB (gastrointestinal bleeding)  Assessment & Plan  · Patient presents with BRB DC x 1 this evening, previous hx of lower GIB last month, where he was evaluated by GI and expresses having "something cut off"  · Previous records obtained from [facility] revealed he had extensive GI workup including upper and lower endoscopy  EGD showed esophageal varices, colonoscopy showed rectal varices  · It is felt that the source of bleed is from the rectal varices  · Patient is to follow up with GI outpatient, and can be discharged today  ·  Hemoglobin stable 10 5 today        Discharging Physician / Practitioner: Nathan Melgar MD  PCP: Kelly Haley DO  Admission Date:   Admission Orders (From admission, onward)     Ordered        01/03/20 2128  Place in Observation (expected length of stay for this patient is less than two midnights)  Once                   Discharge Date: 01/04/20    Resolved Problems  Date Reviewed: 1/4/2020    None          Consultations During Hospital Stay:  · Gastrointestinal      Reason for Admission:  Bright red blood per rectum    Hospital Course:     Heide Hubbard is a 63 y o  male patient with history of alcohol liver disease, thrombocytopenia who originally presented to the hospital on 1/3/2020 due to bright red blood per rectum  Hemoglobin on presentation 11 5  Since admission patient did not have any dark stools or bright red blood per rectum, hemoglobin stayed stable at 10 5  Bleed is thought to be due to rectal varices and he is to follow up with GI outpatient  Please see above list of diagnoses and related plan for additional information  Condition at Discharge: stable     Discharge Day Visit / Exam:     Subjective:  No overnight events  No bleed, no dizziness or palpitations Patient is eager to go home this morning       Vitals: Blood Pressure: 107/56 (01/04/20 0730)  Pulse: 66 (01/04/20 0730)  Temperature: 98 °F (36 7 °C) (01/04/20 0730)  Temp Source: Temporal (01/03/20 2003)  Respirations: 18 (01/04/20 0730)  Height: 6' (182 9 cm) (01/03/20 2236)  Weight - Scale: 74 8 kg (165 lb) (01/03/20 2236)  SpO2: 91 % (01/04/20 0730)  Exam:   Physical Exam   Constitutional: He is oriented to person, place, and time  He appears well-nourished  No distress  Eyes: Right eye exhibits no discharge  Left eye exhibits no discharge  No scleral icterus  Neck: Normal range of motion  Neck supple  Cardiovascular: Normal rate, regular rhythm, normal heart sounds and intact distal pulses  Exam reveals no gallop and no friction rub  No murmur heard  Pulmonary/Chest: Effort normal and breath sounds normal  No stridor  No respiratory distress  He has no wheezes  Abdominal: Soft  Bowel sounds are normal  He exhibits no distension  There is no tenderness  There is no guarding  Musculoskeletal: Normal range of motion  He exhibits no edema, tenderness or deformity  Neurological: He is alert and oriented to person, place, and time  No cranial nerve deficit  Coordination normal    Skin: Skin is warm  He is not diaphoretic  Discussion with Family:  Patient opted to update family himself    Discharge instructions/Information to patient and family:   See after visit summary for information provided to patient and family  Provisions for Follow-Up Care:  See after visit summary for information related to follow-up care and any pertinent home health orders  Disposition:     Home    For Discharges to [address] [facility]:   · Not Applicable to this Patient - Not Applicable to this Patient    Planned Readmission:  No     Discharge Statement:  I spent 40 minutes discharging the patient  This time was spent on the day of discharge  I had direct contact with the patient on the day of discharge  Greater than 50% of the total time was spent examining patient, answering all patient questions, arranging and discussing plan of care with patient as well as directly providing post-discharge instructions  Additional time then spent on discharge activities      Discharge Medications:  See after visit summary for reconciled discharge medications provided to patient and family        ** Please Note: This note has been constructed using a voice recognition system **

## 2020-01-04 NOTE — CONSULTS
Consultation - GI   Matt uCmmings 63 y o  male MRN: [id number]  Unit/Bed#: -01 Encounter: [id number]    Consults  ASSESSMENT and PLAN    3 61-year-old male with a history of cirrhosis on the basis of alcohol admitted with rectal bleeding  Extensive workup in December of 2019 as outlined above  Almost certainly this is bleeding rectal varices  They appear to have stopped  Treatment is rendered at lowering portal pressure  I am not sure what role with any banding of the hemorrhoids has in the situation  In any case I would advance his diet and discharge him we want to see him in the office in follow-up which will be very important I gave him our card will attempt to move up his appointment when he contact us on Monday  Chief Complaint   Patient presents with    Rectal Bleeding     pt had hemoroid rupture and had hemorroids sutured  Tonight pt felt warmth and notice his was bleeding     Physician Requesting Consult: Jerilyn Boeck, MD    HPI Matt Cummings is a 63y o  year old male who presents with rectal bleeding  He is a known to us from December 4th encounter is a [facility] where he was admitted for similar problems had an extensive GI workup including upper and lower endoscopy which showed grade 3 esophageal varices which did not appear to be bleeding and rectal varices which appeared to be the source of the blood loss  He also had a tubular adenoma removed for the colon  He was doing well until yesterday when he began to pass moderate amount of bright red blood per rectum this is since stopped  He says he feels fine he wants to go home he has been abstinent from alcohol for over 2 years  Hep C was ordered at [facility] and was negative  He has an appointment to see us January 24th      Historical Information   Past Medical History:   Diagnosis Date    Cardiac disease     Chronic pain     back and neck    COPD (chronic obstructive pulmonary disease) ([facility] )     Hypertension      Past Surgical History:   Procedure Laterality Date    BACK SURGERY      CERVICAL FUSION      CHOLECYSTECTOMY      HERNIA REPAIR      KNEE SURGERY      NECK SURGERY       Social History   Social History     Substance and Sexual Activity   Alcohol Use Not Currently     Social History     Substance and Sexual Activity   Drug Use No     Social History     Tobacco Use   Smoking Status Current Every Day Smoker   Smokeless Tobacco Never Used     History reviewed  No pertinent family history      Meds/Allergies   Current Facility-Administered Medications   Medication Dose Route Frequency    acetaminophen (TYLENOL) tablet 650 mg  650 mg Oral Q8H PRN    albuterol inhalation solution 2 5 mg  2 5 mg Nebulization Q6H PRN    buprenorphine-naloxone (SUBOXONE) 8-2 mg per SL film 1 Film  1 Film Sublingual Daily    influenza vaccine, recombinant, quadrivalent (FLUBLOK) IM injection 0 5 mL  0 5 mL Intramuscular Once    lidocaine-epinephrine (XYLOCAINE/EPINEPHRINE) 1 %-1:100,000 injection 10 mL  10 mL Infiltration Once    nicotine (NICODERM CQ) 21 mg/24 hr TD 24 hr patch 1 patch  1 patch Transdermal Daily    pantoprazole (PROTONIX) injection 40 mg  40 mg Intravenous Q12H CONSTANTINO     Medications Prior to Admission   Medication    albuterol (PROVENTIL HFA,VENTOLIN HFA) 90 mcg/act inhaler    buprenorphine-naloxone (SUBOXONE) 8-2 mg per SL tablet    aspirin 81 MG tablet    atorvastatin (LIPITOR) 40 mg tablet    furosemide (LASIX) 20 mg tablet    HYDROcodone-acetaminophen (NORCO) 5-325 mg per tablet    isosorbide mononitrate (IMDUR) 60 mg 24 hr tablet    methocarbamol (ROBAXIN) 750 mg tablet    metoprolol tartrate (LOPRESSOR) 25 mg tablet       Allergies   Allergen Reactions    Flexeril [Cyclobenzaprine] Other (See Comments)     Hallucination      Morphine And Related Hives    Naprosyn [Naproxen] GI Intolerance    Ultram [Tramadol] GI Intolerance       PHYSICALEXAM  Blood pressure 107/56, pulse 66, temperature 98 °F (36 7 °C), resp  rate 18, height 6' (1 829 m), weight 74 8 kg (165 lb), SpO2 91 %  Body mass index is 22 38 kg/m²  General Appearance: NAD, cooperative, alert  Eyes: Anicteric, PERRLA, EOMI  ENT:  Normocephalic, atraumatic, normal mucosa  Neck:  Supple, symmetrical, trachea midline  Resp:  Clear to auscultation bilaterally; no rales, rhonchi or wheezing; respirations unlabored   CV:  S1 S2, Regular rate and rhythm; no murmur, rub, or gallop  GI:  Soft, non-tender, non-distended; normal bowel sounds; no masses, no organomegaly   Rectal: Deferred  Musculoskeletal: No cyanosis, clubbing or edema  Normal ROM  Skin:  No jaundice, rashes, or lesions   Heme/Lymph: No palpable cervical lymphadenopathy  Psych: Normal affect, good eye contact  Neuro: No gross deficits, AAOx3    Lab Results   Component Value Date    GLUCOSE 135 08/27/2015    CALCIUM 8 0 (L) 01/04/2020     08/27/2015    K 3 8 01/04/2020    CO2 27 01/04/2020     01/04/2020    BUN 8 01/04/2020    CREATININE 0 76 01/04/2020     Lab Results   Component Value Date    WBC 1 82 (LL) 01/04/2020    HGB 10 5 (L) 01/04/2020    HCT 31 8 (L) 01/04/2020    MCV 93 01/04/2020    PLT 46 (LL) 01/04/2020     Lab Results   Component Value Date    ALT 30 01/04/2020    AST 36 01/04/2020    ALKPHOS 211 (H) 01/04/2020    BILITOT 0 82 08/27/2015     No results found for: AMYLASE  Lab Results   Component Value Date    LIPASE 133 01/03/2020     No results found for: IRON, TIBC, FERRITIN  Lab Results   Component Value Date    INR 1 25 (H) 01/03/2020       Imaging Studies: I have personally reviewed pertinent reports  EKG, Pathology, and Other Studies: I have personally reviewed pertinent reports  REVIEW OF SYSTEMS:    CONSTITUTIONAL: Denies any fever, chills, rigors, and weight loss  HEENT: No earache or tinnitus  Denies hearing loss or visual disturbances  CARDIOVASCULAR: No chest pain or palpitations     RESPIRATORY: Denies any cough, hemoptysis, shortness of breath or dyspnea on exertion  GASTROINTESTINAL: As noted in the History of Present Illness  GENITOURINARY: No problems with urination  Denies any hematuria or dysuria  NEUROLOGIC: No dizziness or vertigo, denies headaches  MUSCULOSKELETAL: Denies any muscle or joint pain  SKIN: Denies skin rashes or itching  ENDOCRINE: Denies excessive thirst  Denies intolerance to heat or cold  PSYCHOSOCIAL: Denies depression or anxiety  Denies any recent memory loss

## 2020-01-04 NOTE — ASSESSMENT & PLAN NOTE
· Patient was an alcohol abuser and Percocet user  · This admission LFTs appear to be around baseline when compared to LFTs in 2015  · He expresses not drinking for over 2 years now, however, he did just get out of rehab for addiction to Percocet  · Trend LFTs every other day

## 2020-01-04 NOTE — ASSESSMENT & PLAN NOTE
· Likely in the setting of GI bleed  · Hemoglobin 11 5 on admission  · Trend hemoglobin q 8 hours  · Transfuse for hemoglobin less than 7 0

## 2020-01-04 NOTE — ED PROVIDER NOTES
History  Chief Complaint   Patient presents with    Rectal Bleeding     pt had hemoroid rupture and had hemorroids sutured  Tonight pt felt warmth and notice his was bleeding     HPI     81-year-old male presents for rectal bleeding  The patient has a past medical history of alcohol abuse that is in remission, cirrhosis, internal external hemorrhoids  The patient reports that in November using [facility] and was in the ER multiple times because of bleeding, he will lost multiple L of blood in required transfusions and a colonoscopy and an endoscopy  Patient is not great historian, reviewing the records it appears that the patient had a paracentesis at some point, there is question whether not there was esophageal varices on the EGD, he appears to definitely have rectal varices internal and external hemorrhoids    A/P: lower gi bleeding  Likely internal hemorrhoids vs rectal varices  Discussed with GI, plan to admit for trend hgb, gi consult  Prior to Admission Medications   Prescriptions Last Dose Informant Patient Reported? Taking? HYDROcodone-acetaminophen (NORCO) 5-325 mg per tablet   No No   Sig: Take 1 tablet by mouth every 6 (six) hours as needed for moderate pain  Max Daily Amount: 4 tablets   albuterol (PROVENTIL HFA,VENTOLIN HFA) 90 mcg/act inhaler 1/3/2020 at Unknown time Self Yes Yes   Sig: Inhale 2 puffs as needed for shortness of breath   aspirin 81 MG tablet Not Taking at Unknown time  Yes No   Sig: Take 81 mg by mouth daily  atorvastatin (LIPITOR) 40 mg tablet Not Taking at Unknown time  Yes No   Sig: Take 40 mg by mouth daily  buprenorphine-naloxone (SUBOXONE) 8-2 mg per SL tablet   Yes Yes   Sig: Place 1 tablet under the tongue   furosemide (LASIX) 20 mg tablet   No No   Sig: Take 2 tablets (40 mg total) by mouth daily   isosorbide mononitrate (IMDUR) 60 mg 24 hr tablet Not Taking at Unknown time  Yes No   Sig: Take 60 mg by mouth daily     methocarbamol (ROBAXIN) 750 mg tablet Not Taking at Unknown time  No No   Sig: Take 1 tablet (750 mg total) by mouth 2 (two) times a day  Patient not taking: Reported on 1/3/2020   metoprolol tartrate (LOPRESSOR) 25 mg tablet Not Taking at Unknown time  Yes No   Sig: Take 25 mg by mouth 2 (two) times a day  Facility-Administered Medications: None       Past Medical History:   Diagnosis Date    Cardiac disease     Chronic pain     back and neck    COPD [facility]chronic obstructive pulmonary disease) ([facility] [address] )     Hypertension        Past Surgical History:   Procedure Laterality Date    BACK SURGERY      CERVICAL FUSION      CHOLECYSTECTOMY      HERNIA REPAIR      KNEE SURGERY      NECK SURGERY         History reviewed  No pertinent family history  I have reviewed and agree with the history as documented  Social History     Tobacco Use    Smoking status: Current Every Day Smoker    Smokeless tobacco: Never Used   Substance Use Topics    Alcohol use: Not Currently    Drug use: No        Review of Systems   Constitutional: Negative for chills, fatigue and fever  Eyes: Negative for photophobia and visual disturbance  Respiratory: Negative for cough and shortness of breath  Cardiovascular: Negative for chest pain, palpitations and leg swelling  Gastrointestinal: Negative for diarrhea, nausea and vomiting  Endocrine: Negative for polydipsia and polyuria  Genitourinary: Negative for decreased urine volume, difficulty urinating, dysuria and frequency  Musculoskeletal: Negative for back pain, neck pain and neck stiffness  Skin: Negative for color change and rash  Allergic/Immunologic: Negative for environmental allergies and immunocompromised state  Neurological: Negative for dizziness and headaches  Hematological: Negative for adenopathy  Does not bruise/bleed easily  Psychiatric/Behavioral: Negative for dysphoric mood  The patient is not nervous/anxious          Physical Exam  Physical Exam   Constitutional: He is oriented to person, place, and time  He appears well-developed  HENT:   Head: Normocephalic and atraumatic  Right Ear: External ear normal    Left Ear: External ear normal    Mouth/Throat: Oropharynx is clear and moist    Eyes: Pupils are equal, round, and reactive to light  Conjunctivae and EOM are normal    Neck: Normal range of motion  Neck supple  No JVD present  No thyromegaly present  Cardiovascular: Normal rate, regular rhythm and normal heart sounds  Exam reveals no gallop and no friction rub  No murmur heard  Pulmonary/Chest: Effort normal and breath sounds normal  No respiratory distress  He has no wheezes  He has no rales  Abdominal: Soft  Bowel sounds are normal  He exhibits no distension  There is no rebound and no guarding  Genitourinary: Rectal exam shows internal hemorrhoid and guaiac positive stool  Musculoskeletal: Normal range of motion  He exhibits no edema  Lymphadenopathy:     He has no cervical adenopathy  Neurological: He is alert and oriented to person, place, and time  No cranial nerve deficit  Skin: Skin is warm  Psychiatric: He has a normal mood and affect  His behavior is normal    Nursing note and vitals reviewed        Vital Signs  ED Triage Vitals [01/03/20 2003]   Temperature Pulse Respirations Blood Pressure SpO2   98 9 °F (37 2 °C) 92 18 (!) 172/85 97 %      Temp Source Heart Rate Source Patient Position - Orthostatic VS BP Location FiO2 (%)   Temporal Monitor Sitting Right arm --      Pain Score       No Pain           Vitals:    01/03/20 2130 01/03/20 2236 01/04/20 0003 01/04/20 0730   BP: 154/81 148/70 107/50 107/56   Pulse: 74 72 69 66   Patient Position - Orthostatic VS: Sitting            Visual Acuity      ED Medications  Medications   potassium chloride (K-DUR,KLOR-CON) CR tablet 40 mEq (40 mEq Oral Given 1/4/20 0149)       Diagnostic Studies  Results Reviewed     Procedure Component Value Units Date/Time    CBC and differential [[id number]]  (Abnormal) Collected:  01/03/20 2005    Lab Status:  Final result Specimen:  Blood from Arm, Left Updated:  01/03/20 2034     WBC 3 22 Thousand/uL      RBC 3 78 Million/uL      Hemoglobin 11 5 g/dL      Hematocrit 34 9 %      MCV 92 fL      MCH 30 4 pg      MCHC 33 0 g/dL      RDW 14 0 %      MPV 11 4 fL      Platelets 56 Thousands/uL      nRBC 0 /100 WBCs      Neutrophils Relative 72 %      Immat GRANS % 0 %      Lymphocytes Relative 19 %      Monocytes Relative 7 %      Eosinophils Relative 2 %      Basophils Relative 0 %      Neutrophils Absolute 2 28 Thousands/µL      Immature Grans Absolute 0 01 Thousand/uL      Lymphocytes Absolute 0 62 Thousands/µL      Monocytes Absolute 0 23 Thousand/µL      Eosinophils Absolute 0 07 Thousand/µL      Basophils Absolute 0 01 Thousands/µL     Protime-INR [[id number]]  (Abnormal) Collected:  01/03/20 2005    Lab Status:  Final result Specimen:  Blood from Arm, Left Updated:  01/03/20 2032     Protime 15 4 seconds      INR 1 25    APTT [[id number]]  (Normal) Collected:  01/03/20 2005    Lab Status:  Final result Specimen:  Blood from Arm, Left Updated:  01/03/20 2032     PTT 35 seconds     Comprehensive metabolic panel [[id number]]  (Abnormal) Collected:  01/03/20 2005    Lab Status:  Final result Specimen:  Blood from Arm, Left Updated:  01/03/20 2031     Sodium 137 mmol/L      Potassium 3 2 mmol/L      Chloride 101 mmol/L      CO2 27 mmol/L      ANION GAP 9 mmol/L      BUN 6 mg/dL      Creatinine 0 89 mg/dL      Glucose 157 mg/dL      Calcium 8 1 mg/dL      AST 56 U/L      ALT 39 U/L      Alkaline Phosphatase 266 U/L      Total Protein 7 4 g/dL      Albumin 3 2 g/dL      Total Bilirubin 0 70 mg/dL      eGFR 94 ml/min/1 73sq m     Narrative:       [facility] guidelines for Chronic Kidney Disease (CKD):     Stage 1 with normal or high GFR (GFR > 90 mL/min/1 73 square meters)    Stage 2 Mild CKD (GFR = 60-89 mL/min/1 73 square meters)    Stage 3A Moderate CKD (GFR = 45-59 mL/min/1 73 square meters)    Stage 3B Moderate CKD (GFR = 30-44 mL/min/1 73 square meters)    Stage 4 Severe CKD (GFR = 15-29 mL/min/1 73 square meters)    Stage 5 End Stage CKD (GFR <15 mL/min/1 73 square meters)  Note: GFR calculation is accurate only with a steady state creatinine    Lipase [[id number]]  (Normal) Collected:  01/03/20 2005    Lab Status:  Final result Specimen:  Blood from Arm, Left Updated:  01/03/20 2031     Lipase 133 u/L                  No orders to display              Procedures  Procedures         ED Course  ED Course as of Jan 06 2335 Fri Jan 03, 2020 2129 Discussed with Dr Tsering Gibson,  Given history of cirrhosis, rectal varices, and last admission at [facility] the patient required blood transfusion according to the patient, recommend admission to Medicine and consult GI for evaluation                                    MDM  Number of Diagnoses or Management Options  Anemia: new and requires workup  Lower GI bleed: new and requires workup  Rectal varices: new and requires workup  Thrombocytopenia ([facility] [address] ): new and requires workup     Amount and/or Complexity of Data Reviewed  Clinical lab tests: reviewed and ordered  Tests in the medicine section of CPT®: reviewed and ordered  Discuss the patient with other providers: yes  Independent visualization of images, tracings, or specimens: yes    Patient Progress  Patient progress: stable        Disposition  Final diagnoses:   Lower GI bleed   Anemia   Thrombocytopenia ([address] )   Rectal varices     Time reflects when diagnosis was documented in both MDM as applicable and the Disposition within this note     Time User Action Codes Description Comment    1/3/2020  9:26 PM Nelson Chong Add [K92 2] Lower GI bleed     1/6/2020 11:34 PM Sueellsavanna Augustins Add [D64 9] Anemia     1/6/2020 11:34 PM Sueellsavanna Augustins Add [D69 6] Thrombocytopenia ([address] )     1/6/2020 11:34 PM Sueellsavanna Augustins Add [K64 9] Rectal varices       ED Disposition     ED Disposition Condition Date/Time Comment    Admit Stable Fri Cornelius 3, 2020  9:26 PM Case was discussed with Guillaume Trujillo and the patient's admission status was agreed to be Admission Status: observation status to the service of Dr Guillaume Trujillo   Follow-up Information     Follow up With Specialties Details Why Contact Info    Stephanie Kirk DO Family Medicine Schedule an appointment as soon as possible for a visit in 1 week(s)  [address]  [facility] [address] [facility]      Lien Chavez MD Gastroenterology Schedule an appointment as soon as possible for a visit in 1 week(s)  [address] [facility]  [address] [facility] [address]  [phone]            Discharge Medication List as of 1/4/2020 12:27 PM      CONTINUE these medications which have NOT CHANGED    Details   albuterol (PROVENTIL HFA,VENTOLIN HFA) 90 mcg/act inhaler Inhale 2 puffs as needed for shortness of breath, Historical Med      buprenorphine-naloxone (SUBOXONE) 8-2 mg per SL tablet Place 1 tablet under the tongue, Historical Med      aspirin 81 MG tablet Take 81 mg by mouth daily  , Until Discontinued, Historical Med      atorvastatin (LIPITOR) 40 mg tablet Take 40 mg by mouth daily  , Until Discontinued, Historical Med      furosemide (LASIX) 20 mg tablet Take 2 tablets (40 mg total) by mouth daily, Starting Fri 12/6/2019, Normal      isosorbide mononitrate (IMDUR) 60 mg 24 hr tablet Take 60 mg by mouth daily  , Until Discontinued, Historical Med      methocarbamol (ROBAXIN) 750 mg tablet Take 1 tablet (750 mg total) by mouth 2 (two) times a day , Starting 6/28/2016, Until Discontinued, Print      metoprolol tartrate (LOPRESSOR) 25 mg tablet Take 25 mg by mouth 2 (two) times a day , Until Discontinued, Historical Med         STOP taking these medications       HYDROcodone-acetaminophen (NORCO) 5-325 mg per tablet Comments:   Reason for Stopping:             No discharge procedures on file      ED Provider  Electronically Signed by           Adalgisa Hardwick DO  01/06/20 2331

## 2020-01-04 NOTE — ASSESSMENT & PLAN NOTE
· Chronic, likely related to liver disease  · Platelets this admission were 56, as per lab results four years ago platelets were 64  · Trend platelets daily  · Transfuse for platelets less than 40 while actively bleeding

## 2020-01-04 NOTE — ASSESSMENT & PLAN NOTE
· Likely in the setting of GI bleed  · Hemoglobin 11 5 on admission, now 10 5  · Trend hemoglobin q 8 hours  · Transfuse for hemoglobin less than 7 0

## 2020-01-04 NOTE — ASSESSMENT & PLAN NOTE
· Patient presents with BRB OR x 1 this evening, previous hx of lower GIB last month, where he was evaluated by GI and expresses having "something cut off"  · Previous records obtained from [facility] revealed he had extensive GI workup including upper and lower endoscopy    EGD showed esophageal varices, colonoscopy showed rectal varices  · It is felt that the source of bleed is from the rectal varices  · Patient is to follow up with GI outpatient, and can be discharged today  ·  Hemoglobin stable 10 5 today

## 2020-01-04 NOTE — PLAN OF CARE
Problem: NEUROSENSORY - ADULT  Goal: Achieves maximal functionality and self care  Description  INTERVENTIONS  - Monitor swallowing and airway patency with patient fatigue and changes in neurological status  - Encourage and assist patient to increase activity and self care     - Encourage visually impaired, hearing impaired and aphasic patients to use assistive/communication devices  Outcome: Adequate for Discharge     Problem: CARDIOVASCULAR - ADULT  Goal: Maintains optimal cardiac output and hemodynamic stability  Description  INTERVENTIONS:  - Monitor I/O, vital signs and rhythm  - Monitor for S/S and trends of decreased cardiac output  - Administer and titrate ordered vasoactive medications to optimize hemodynamic stability  - Assess quality of pulses, skin color and temperature  - Assess for signs of decreased coronary artery perfusion  - Instruct patient to report change in severity of symptoms  Outcome: Adequate for Discharge  Goal: Absence of cardiac dysrhythmias or at baseline rhythm  Description  INTERVENTIONS:  - Continuous cardiac monitoring, vital signs, obtain 12 lead EKG if ordered  - Administer antiarrhythmic and heart rate control medications as ordered  - Monitor electrolytes and administer replacement therapy as ordered  Outcome: Adequate for Discharge     Problem: METABOLIC, FLUID AND ELECTROLYTES - ADULT  Goal: Electrolytes maintained within normal limits  Description  INTERVENTIONS:  - Monitor labs and assess patient for signs and symptoms of electrolyte imbalances  - Administer electrolyte replacement as ordered  - Monitor response to electrolyte replacements, including repeat lab results as appropriate  - Instruct patient on fluid and nutrition as appropriate  Outcome: Adequate for Discharge     Problem: HEMATOLOGIC - ADULT  Goal: Maintains hematologic stability  Description  INTERVENTIONS  - Assess for signs and symptoms of bleeding or hemorrhage  - Monitor labs  - Administer supportive blood products/factors as ordered and appropriate  Outcome: Adequate for Discharge

## 2020-01-04 NOTE — ASSESSMENT & PLAN NOTE
· Patient states he is not on anything other than diuretics  · Will hold Lasix in the setting of possible GI bleed and trend blood pressure  · Current systolic -[phone]

## 2020-01-04 NOTE — ASSESSMENT & PLAN NOTE
· Potassium was 3 2 on admission, will give 40 mEq  of p o  X2, for goal potassium of greater than 4 0

## 2020-01-08 ENCOUNTER — HOSPITAL ENCOUNTER (EMERGENCY)
Facility: HOSPITAL | Age: 60
Discharge: HOME | End: 2020-01-08
Attending: EMERGENCY MEDICINE | Admitting: EMERGENCY MEDICINE
Payer: MEDICARE

## 2020-01-08 ENCOUNTER — OFFICE VISIT (OUTPATIENT)
Dept: GASTROENTEROLOGY | Facility: CLINIC | Age: 60
End: 2020-01-08
Payer: MEDICARE

## 2020-01-08 VITALS
DIASTOLIC BLOOD PRESSURE: 78 MMHG | SYSTOLIC BLOOD PRESSURE: 110 MMHG | HEART RATE: 69 BPM | RESPIRATION RATE: 13 BRPM | OXYGEN SATURATION: 95 %

## 2020-01-08 VITALS
HEIGHT: 72 IN | WEIGHT: 165 LBS | BODY MASS INDEX: 22.35 KG/M2 | SYSTOLIC BLOOD PRESSURE: 108 MMHG | HEART RATE: 68 BPM | DIASTOLIC BLOOD PRESSURE: 60 MMHG

## 2020-01-08 DIAGNOSIS — I85.00 ESOPHAGEAL VARICES WITHOUT BLEEDING, UNSPECIFIED ESOPHAGEAL VARICES TYPE (HCC): Primary | ICD-10-CM

## 2020-01-08 DIAGNOSIS — D64.9 ANEMIA: ICD-10-CM

## 2020-01-08 DIAGNOSIS — K62.5 RECTAL BLEEDING: Primary | ICD-10-CM

## 2020-01-08 DIAGNOSIS — K62.5 RECTAL BLEEDING: ICD-10-CM

## 2020-01-08 DIAGNOSIS — Z12.11 COLON CANCER SCREENING: ICD-10-CM

## 2020-01-08 DIAGNOSIS — K64.8 OTHER HEMORRHOIDS: ICD-10-CM

## 2020-01-08 DIAGNOSIS — D69.6 THROMBOCYTOPENIA (HCC): ICD-10-CM

## 2020-01-08 DIAGNOSIS — E87.6 HYPOKALEMIA: ICD-10-CM

## 2020-01-08 LAB
ABO GROUP BLD: NORMAL
ALBUMIN SERPL BCP-MCNC: 3.3 G/DL (ref 3.5–5)
ALP SERPL-CCNC: 262 U/L (ref 46–116)
ALT SERPL W P-5'-P-CCNC: 35 U/L (ref 12–78)
ANION GAP SERPL CALCULATED.3IONS-SCNC: 8 MMOL/L (ref 4–13)
APTT PPP: 37 SECONDS (ref 23–37)
AST SERPL W P-5'-P-CCNC: 54 U/L (ref 5–45)
ATRIAL RATE: 80 BPM
BASOPHILS # BLD AUTO: 0.01 THOUSANDS/ΜL (ref 0–0.1)
BASOPHILS NFR BLD AUTO: 0 % (ref 0–1)
BILIRUB SERPL-MCNC: 0.7 MG/DL (ref 0.2–1)
BLD GP AB SCN SERPL QL: NEGATIVE
BUN SERPL-MCNC: 9 MG/DL (ref 5–25)
CALCIUM SERPL-MCNC: 8.8 MG/DL (ref 8.3–10.1)
CHLORIDE SERPL-SCNC: 104 MMOL/L (ref 100–108)
CO2 SERPL-SCNC: 31 MMOL/L (ref 21–32)
CREAT SERPL-MCNC: 0.88 MG/DL (ref 0.6–1.3)
EOSINOPHIL # BLD AUTO: 0.1 THOUSAND/ΜL (ref 0–0.61)
EOSINOPHIL NFR BLD AUTO: 3 % (ref 0–6)
ERYTHROCYTE [DISTWIDTH] IN BLOOD BY AUTOMATED COUNT: 14 % (ref 11.6–15.1)
GFR SERPL CREATININE-BSD FRML MDRD: 95 ML/MIN/1.73SQ M
GLUCOSE SERPL-MCNC: 107 MG/DL (ref 65–140)
HCT VFR BLD AUTO: 38 % (ref 36.5–49.3)
HGB BLD-MCNC: 12.4 G/DL (ref 12–17)
IMM GRANULOCYTES # BLD AUTO: 0.01 THOUSAND/UL (ref 0–0.2)
IMM GRANULOCYTES NFR BLD AUTO: 0 % (ref 0–2)
INR PPP: 1.24 (ref 0.84–1.19)
LYMPHOCYTES # BLD AUTO: 0.6 THOUSANDS/ΜL (ref 0.6–4.47)
LYMPHOCYTES NFR BLD AUTO: 18 % (ref 14–44)
MCH RBC QN AUTO: 30.2 PG (ref 26.8–34.3)
MCHC RBC AUTO-ENTMCNC: 32.6 G/DL (ref 31.4–37.4)
MCV RBC AUTO: 93 FL (ref 82–98)
MONOCYTES # BLD AUTO: 0.2 THOUSAND/ΜL (ref 0.17–1.22)
MONOCYTES NFR BLD AUTO: 6 % (ref 4–12)
NEUTROPHILS # BLD AUTO: 2.49 THOUSANDS/ΜL (ref 1.85–7.62)
NEUTS SEG NFR BLD AUTO: 73 % (ref 43–75)
NRBC BLD AUTO-RTO: 0 /100 WBCS
P AXIS: 66 DEGREES
PLATELET # BLD AUTO: 58 THOUSANDS/UL (ref 149–390)
PMV BLD AUTO: 11.1 FL (ref 8.9–12.7)
POTASSIUM SERPL-SCNC: 3.1 MMOL/L (ref 3.5–5.3)
PR INTERVAL: 176 MS
PROT SERPL-MCNC: 7.7 G/DL (ref 6.4–8.2)
PROTHROMBIN TIME: 15.3 SECONDS (ref 11.6–14.5)
QRS AXIS: 86 DEGREES
QRSD INTERVAL: 94 MS
QT INTERVAL: 418 MS
QTC INTERVAL: 482 MS
RBC # BLD AUTO: 4.1 MILLION/UL (ref 3.88–5.62)
RH BLD: POSITIVE
SODIUM SERPL-SCNC: 143 MMOL/L (ref 136–145)
SPECIMEN EXPIRATION DATE: NORMAL
T WAVE AXIS: -8 DEGREES
VENTRICULAR RATE: 80 BPM
WBC # BLD AUTO: 3.41 THOUSAND/UL (ref 4.31–10.16)

## 2020-01-08 PROCEDURE — 93005 ELECTROCARDIOGRAM TRACING: CPT

## 2020-01-08 PROCEDURE — 86850 RBC ANTIBODY SCREEN: CPT | Performed by: EMERGENCY MEDICINE

## 2020-01-08 PROCEDURE — 99214 OFFICE O/P EST MOD 30 MIN: CPT | Performed by: INTERNAL MEDICINE

## 2020-01-08 PROCEDURE — 93010 ELECTROCARDIOGRAM REPORT: CPT | Performed by: INTERNAL MEDICINE

## 2020-01-08 PROCEDURE — 85025 COMPLETE CBC W/AUTO DIFF WBC: CPT | Performed by: EMERGENCY MEDICINE

## 2020-01-08 PROCEDURE — 99285 EMERGENCY DEPT VISIT HI MDM: CPT

## 2020-01-08 PROCEDURE — 80053 COMPREHEN METABOLIC PANEL: CPT | Performed by: EMERGENCY MEDICINE

## 2020-01-08 PROCEDURE — 85730 THROMBOPLASTIN TIME PARTIAL: CPT | Performed by: EMERGENCY MEDICINE

## 2020-01-08 PROCEDURE — 86900 BLOOD TYPING SEROLOGIC ABO: CPT | Performed by: EMERGENCY MEDICINE

## 2020-01-08 PROCEDURE — 86901 BLOOD TYPING SEROLOGIC RH(D): CPT | Performed by: EMERGENCY MEDICINE

## 2020-01-08 PROCEDURE — 85610 PROTHROMBIN TIME: CPT | Performed by: EMERGENCY MEDICINE

## 2020-01-08 PROCEDURE — 99285 EMERGENCY DEPT VISIT HI MDM: CPT | Performed by: EMERGENCY MEDICINE

## 2020-01-08 PROCEDURE — 36415 COLL VENOUS BLD VENIPUNCTURE: CPT | Performed by: EMERGENCY MEDICINE

## 2020-01-08 PROCEDURE — 96365 THER/PROPH/DIAG IV INF INIT: CPT

## 2020-01-08 RX ORDER — POTASSIUM CHLORIDE 20 MEQ/1
40 TABLET, EXTENDED RELEASE ORAL ONCE
Status: COMPLETED | OUTPATIENT
Start: 2020-01-08 | End: 2020-01-08

## 2020-01-08 RX ORDER — PROPRANOLOL HYDROCHLORIDE 10 MG/1
10 TABLET ORAL DAILY
Qty: 30 TABLET | Refills: 3 | Status: SHIPPED | OUTPATIENT
Start: 2020-01-08 | End: 2020-02-10 | Stop reason: SDUPTHER

## 2020-01-08 RX ORDER — MAGNESIUM SULFATE HEPTAHYDRATE 40 MG/ML
2 INJECTION, SOLUTION INTRAVENOUS ONCE
Status: COMPLETED | OUTPATIENT
Start: 2020-01-08 | End: 2020-01-08

## 2020-01-08 RX ADMIN — POTASSIUM CHLORIDE 40 MEQ: 1500 TABLET, EXTENDED RELEASE ORAL at 07:03

## 2020-01-08 RX ADMIN — MAGNESIUM SULFATE HEPTAHYDRATE 2 G: 40 INJECTION, SOLUTION INTRAVENOUS at 07:03

## 2020-01-08 NOTE — ED PROVIDER NOTES
History  Chief Complaint   Patient presents with    Rectal Bleeding     Patiet coughed and experienced rectal bleeding  This happened last friday      18-year-old male with history of previous alcohol abuse, previous narcotic abuse currently on Suboxone, history of esophageal and rectal varices in lower GI bleeding presents for evaluation of continued lower GI bleeding  Patient was in the bathroom, he coughed and experienced rectal bleeding which stopped on some within the next few minutes  No current rectal bleeding, no abdominal pain  Patient was discharge from the hospital on 01/03 after be admitted for observation in setting of breath red blood per rectum, thrombocytopenia, anemia  Patient has had an extensive workup with GI at [facility] including colonoscopy and endoscopy and was supposed to follow up with GI as an outpatient  Patient has not required transfusions of red blood cells or platelets  during previous admission  Today comes in with bright red blood per rectum, no abdominal pain, no nausea or vomiting  No medications taken prior to arrival    Of note during his admission from 1/03-1/04 patient was seen and evaluated by Gastroenterology, they believe that his rectal bleeding which has been going on for months and has been extensively worked up at [facility] in December of 2019 is likely due to rectal varices, a treatment for this would have to be focused on reducing portal pressure in the setting of his liver cirrhosis  The did not feel that banding his hemorrhoids would be beneficial at that time  Patient was supposed to see Dr Elio Guevara, Gastroenterology as an outpatient and his appoint will was initially on 1/06 that was postponed to later in the month  Prior to Admission Medications   Prescriptions Last Dose Informant Patient Reported? Taking?    albuterol (PROVENTIL HFA,VENTOLIN HFA) 90 mcg/act inhaler  Self Yes Yes   Sig: Inhale 2 puffs as needed for shortness of breath atorvastatin (LIPITOR) 40 mg tablet   Yes Yes   Sig: Take 40 mg by mouth daily  buprenorphine-naloxone (SUBOXONE) 8-2 mg per SL tablet   Yes Yes   Sig: Place 1 tablet under the tongue   furosemide (LASIX) 20 mg tablet   No Yes   Sig: Take 2 tablets (40 mg total) by mouth daily   isosorbide mononitrate (IMDUR) 60 mg 24 hr tablet   Yes Yes   Sig: Take 60 mg by mouth daily  methocarbamol (ROBAXIN) 750 mg tablet Not Taking at Unknown time  No No   Sig: Take 1 tablet (750 mg total) by mouth 2 (two) times a day  Facility-Administered Medications: None       Past Medical History:   Diagnosis Date    Cardiac disease     Chronic pain     back and neck    COPD (chronic obstructive pulmonary disease) (HCC)     Hypertension        Past Surgical History:   Procedure Laterality Date    BACK SURGERY      CERVICAL FUSION      CHOLECYSTECTOMY      HERNIA REPAIR      KNEE SURGERY      NECK SURGERY         Family History   Problem Relation Age of Onset    Heart disease Father     Heart disease Brother     Colon polyps Neg Hx     Colon cancer Neg Hx      I have reviewed and agree with the history as documented  Social History     Tobacco Use    Smoking status: Current Every Day Smoker    Smokeless tobacco: Never Used   Substance Use Topics    Alcohol use: Not Currently    Drug use: No        Review of Systems   Constitutional: Negative for appetite change, chills and fever  HENT: Negative for rhinorrhea and sore throat  Eyes: Negative for photophobia and visual disturbance  Respiratory: Negative for cough and shortness of breath  Cardiovascular: Negative for chest pain and palpitations  Gastrointestinal: Positive for blood in stool  Negative for abdominal pain and diarrhea  Genitourinary: Negative for dysuria, frequency and urgency  Skin: Negative for rash  Neurological: Negative for dizziness and weakness  All other systems reviewed and are negative        Physical Exam  Physical Exam Constitutional: He is oriented to person, place, and time  He appears well-developed and well-nourished  HENT:   Head: Normocephalic and atraumatic  Right Ear: External ear normal    Left Ear: External ear normal    Mouth/Throat: Oropharynx is clear and moist    Eyes: Pupils are equal, round, and reactive to light  Conjunctivae and EOM are normal    Neck: Normal range of motion  Neck supple  No JVD present  No tracheal deviation present  Cardiovascular: Normal rate, regular rhythm and normal heart sounds  Exam reveals no gallop and no friction rub  No murmur heard  Pulmonary/Chest: Effort normal  No stridor  No respiratory distress  He has no wheezes  He has no rales  Abdominal: Soft  He exhibits no distension and no mass  There is no tenderness  There is no rebound and no guarding  Genitourinary:   Genitourinary Comments: No active rectal bleeding, there is a large external hemorrhoid, no obvious perirectal erythema or fissures   Musculoskeletal: Normal range of motion  He exhibits no edema  Neurological: He is alert and oriented to person, place, and time  No cranial nerve deficit  Skin: Skin is warm and dry  No rash noted  No erythema  No pallor  Psychiatric: He has a normal mood and affect  Nursing note and vitals reviewed        Vital Signs  ED Triage Vitals   Temp Pulse Respirations Blood Pressure SpO2   -- 01/08/20 0615 01/08/20 0615 01/08/20 0615 01/08/20 0615    77 13 126/72 92 %      Temp src Heart Rate Source Patient Position - Orthostatic VS BP Location FiO2 (%)   -- 01/08/20 0700 01/08/20 0700 01/08/20 0700 --    Monitor Lying Right arm       Pain Score       --                  Vitals:    01/08/20 0630 01/08/20 0700 01/08/20 0730 01/08/20 0800   BP: 121/71 124/63 109/58 110/78   Pulse: 77 67 64 69   Patient Position - Orthostatic VS:  Lying Lying Lying         Visual Acuity      ED Medications  Medications   potassium chloride (K-DUR,KLOR-CON) CR tablet 40 mEq (40 mEq Oral Given 1/8/20 0703)   magnesium sulfate 2 g/50 mL IVPB (premix) 2 g (0 g Intravenous Stopped 1/8/20 0804)       Diagnostic Studies  Results Reviewed     Procedure Component Value Units Date/Time    Comprehensive metabolic panel [[id number]]  (Abnormal) Collected:  01/08/20 0617    Lab Status:  Final result Specimen:  Blood from Arm, Left Updated:  01/08/20 1230     Sodium 143 mmol/L      Potassium 3 1 mmol/L      Chloride 104 mmol/L      CO2 31 mmol/L      ANION GAP 8 mmol/L      BUN 9 mg/dL      Creatinine 0 88 mg/dL      Glucose 107 mg/dL      Calcium 8 8 mg/dL      AST 54 U/L      ALT 35 U/L      Alkaline Phosphatase 262 U/L      Total Protein 7 7 g/dL      Albumin 3 3 g/dL      Total Bilirubin 0 70 mg/dL      eGFR 95 ml/min/1 73sq m     Narrative:       National Kidney Disease Foundation guidelines for Chronic Kidney Disease (CKD):     Stage 1 with normal or high GFR (GFR > 90 mL/min/1 73 square meters)    Stage 2 Mild CKD (GFR = 60-89 mL/min/1 73 square meters)    Stage 3A Moderate CKD (GFR = 45-59 mL/min/1 73 square meters)    Stage 3B Moderate CKD (GFR = 30-44 mL/min/1 73 square meters)    Stage 4 Severe CKD (GFR = 15-29 mL/min/1 73 square meters)    Stage 5 End Stage CKD (GFR <15 mL/min/1 73 square meters)  Note: GFR calculation is accurate only with a steady state creatinine    CBC and differential [[id number]]  (Abnormal) Collected:  01/08/20 0617    Lab Status:  Final result Specimen:  Blood from Arm, Left Updated:  01/08/20 0650     WBC 3 41 Thousand/uL      RBC 4 10 Million/uL      Hemoglobin 12 4 g/dL      Hematocrit 38 0 %      MCV 93 fL      MCH 30 2 pg      MCHC 32 6 g/dL      RDW 14 0 %      MPV 11 1 fL      Platelets 58 Thousands/uL      nRBC 0 /100 WBCs      Neutrophils Relative 73 %      Immat GRANS % 0 %      Lymphocytes Relative 18 %      Monocytes Relative 6 %      Eosinophils Relative 3 %      Basophils Relative 0 %      Neutrophils Absolute 2 49 Thousands/µL      Immature Grans Absolute 0 01 Thousand/uL      Lymphocytes Absolute 0 60 Thousands/µL      Monocytes Absolute 0 20 Thousand/µL      Eosinophils Absolute 0 10 Thousand/µL      Basophils Absolute 0 01 Thousands/µL     Protime-INR [[id number]]  (Abnormal) Collected:  01/08/20 0617    Lab Status:  Final result Specimen:  Blood from Arm, Left Updated:  01/08/20 0647     Protime 15 3 seconds      INR 1 24    APTT [[id number]]  (Normal) Collected:  01/08/20 0617    Lab Status:  Final result Specimen:  Blood from Arm, Left Updated:  01/08/20 0647     PTT 37 seconds                  No orders to display              Procedures  Procedures         ED Course  ED Course as of Jan 08 1908 Wed Jan 08, 2020   8261 Procedure Note: EKG  Date/Time: 01/08/20 6:19 AM   Performed by: Vee Greenberg  Authorized by: Vee Greenberg  Indications / Diagnosis: Rectal Bleeding  ECG reviewed by me, the ED Provider: yes   The EKG demonstrates:  Rhythm: normal sinus  Intervals: normal intervals  Axis: normal axis  QRS/Blocks: normal QRS  ST Changes: No acute ST Changes, no STD/PRASHANT  T wave inversions in inferior leads, unchanged from previous  (1/04)          0705 Signed out to AM provider, Dr Rahel Knapp                                  MDM  Number of Diagnoses or Management Options  Diagnosis management comments: 59-year-old male with history of rectal bleeding, rectal varices, alcohol abuse presents for evaluation of rectal bleeding    Will recheck blood work, will re-evaluate        Disposition  Final diagnoses:   Rectal bleeding   Anemia   Thrombocytopenia ([facility] [address] )   Hypokalemia     Time reflects when diagnosis was documented in both MDM as applicable and the Disposition within this note     Time User Action Codes Description Comment    1/8/2020  6:43 AM Caleb Peals Add [K62 5] Rectal bleeding     1/8/2020  6:43 AM [place] Peals Add [D64 9] Anemia     1/8/2020  6:43 AM Caleb Peals Add [D69 6] Thrombocytopenia ([address] )     1/8/2020  6:56 AM [place] Peals Add [E87 6] Hypokalemia       ED Disposition     ED Disposition Condition Date/Time Comment    Discharge Stable Wed Jan 8, 2020  6:43 AM Sandi Keep discharge to home/self care  Follow-up Information     Follow up With Specialties Details Why Contact Info Additional Information    Mike Rivera, DO Family Medicine  As needed [address] [place] [address] [facility] Emergency Department Emergency Medicine  If symptoms worsen [address] [place],[address] [address]  [phone]  ED, [address] [place], [facility], [address]    Anthony Richard MD Gastroenterology Schedule an appointment as soon as possible for a visit  Dustin Cohen [phone]  [phone]             Discharge Medication List as of 1/8/2020  6:57 AM      CONTINUE these medications which have NOT CHANGED    Details   albuterol (PROVENTIL HFA,VENTOLIN HFA) 90 mcg/act inhaler Inhale 2 puffs as needed for shortness of breath, Historical Med      atorvastatin (LIPITOR) 40 mg tablet Take 40 mg by mouth daily  , Until Discontinued, Historical Med      buprenorphine-naloxone (SUBOXONE) 8-2 mg per SL tablet Place 1 tablet under the tongue, Historical Med      furosemide (LASIX) 20 mg tablet Take 2 tablets (40 mg total) by mouth daily, Starting Fri 12/6/2019, Normal      isosorbide mononitrate (IMDUR) 60 mg 24 hr tablet Take 60 mg by mouth daily  , Until Discontinued, Historical Med      methocarbamol (ROBAXIN) 750 mg tablet Take 1 tablet (750 mg total) by mouth 2 (two) times a day , Starting 6/28/2016, Until Discontinued, Print      aspirin 81 MG tablet Take 81 mg by mouth daily  , Until Discontinued, Historical Med      metoprolol tartrate (LOPRESSOR) 25 mg tablet Take 25 mg by mouth 2 (two) times a day , Until Discontinued, Historical Med           No discharge procedures on file      ED Provider  Electronically Signed by           Tedra Canavan, MD  01/08/20 8895

## 2020-01-08 NOTE — ED NOTES
Pt has magnesium running IV   Unable to discharge patient until completion of IV medications     Jimena Galvan, RN  01/08/20 8707

## 2020-01-08 NOTE — PROGRESS NOTES
[facility]patient Follow-up Note  Yasmin Shah 63 y o  male MRN: [id number]  Encounter: [id number]    ASSESSMENT AND PLAN:      1  Rectal bleeding   2  Other hemorrhoids   Pt with moderate to large amount of bright red blood per rectum that started this morning after coughing  Went to [address]  [facility] this AM, Hgb 12 4, platelets 58  Pt had colonoscopy at [facility] on 11/23/19 which showed large internal and external hemorrhoids, rectal varices without bleeding and tubular adenoma  Pt was discharged from ED and recommended to come to appointment today, he still has continued bleeding  Rectal exam reveals large external hemorrhoid but no active bleeding  Differential diagnoses include hemorrhoidal bleeding or rectal varices  Pt is hemodynamically stable at this time  -recommend to apply Preparation H TID to hemorrhoid  -increase fluid and fiber   -follow up Colorectal Surgery Dr Zac Christine for consideration of banding of rectal varices   -if pt experiences recurrence of significant bleeding, go directly to [facility] ED     3  Esophageal varices without bleeding, unspecified esophageal varices type ([address] )  Diagnosed on EGD from [facility] on 11/23/19 showed grade 3 esophageal varices  Pt previously taking Metoprolol but this medication was stopped  -Will start Propranolol 10 mg daily     - propranolol (INDERAL) 10 mg tablet; Take 1 tablet (10 mg total) by mouth daily  Dispense: 30 tablet; Refill: 3    4  Colon cancer screening  Last colonoscopy November 2019  Recall 5 years, November 2024  Followup Appointment: 4-6 weeks  ______________________________________________________________________    Chief Complaint   Patient presents with    Rectal Bleeding     HPI:  Yasmin Shah is a 63y o  year old male with PMH of cirrhosis who presents to office today for follow up visit for rectal bleeding   Pt has been seen multiple times in [facility] and  ED for similar symptoms, had extensive workup including EGD and colonoscopy in November 2019  He also was seen in [facility] ED for rectal bleeding on 1/4-1/5 but Dr Leny Lorenz  Bleeding had resolved at time of discharge but recurred today  He denies any fever/chills, abdominal pain, nausea/vomiting, chest pain, shortness of breath, dizziness  He has been abstinent from alcohol for the past 2 years  Historical Information   Past Medical History:   Diagnosis Date    Cardiac disease     Chronic pain     back and neck    COPD (chronic obstructive pulmonary disease) ([address] )     Hypertension      Past Surgical History:   Procedure Laterality Date    BACK SURGERY      CERVICAL FUSION      CHOLECYSTECTOMY      HERNIA REPAIR      KNEE SURGERY      NECK SURGERY       Social History     Substance and Sexual Activity   Alcohol Use Not Currently     Social History     Substance and Sexual Activity   Drug Use No     Social History     Tobacco Use   Smoking Status Current Every Day Smoker   Smokeless Tobacco Never Used     Family History   Problem Relation Age of Onset    Heart disease Father     Heart disease Brother     Colon polyps Neg Hx     Colon cancer Neg Hx          Current Outpatient Medications:     albuterol (PROVENTIL HFA,VENTOLIN HFA) 90 mcg/act inhaler    atorvastatin (LIPITOR) 40 mg tablet    buprenorphine-naloxone (SUBOXONE) 8-2 mg per SL tablet    furosemide (LASIX) 20 mg tablet    isosorbide mononitrate (IMDUR) 60 mg 24 hr tablet    methocarbamol (ROBAXIN) 750 mg tablet    propranolol (INDERAL) 10 mg tablet  No current facility-administered medications for this visit  Allergies   Allergen Reactions    Flexeril [Cyclobenzaprine] Other (See Comments)     Hallucination      Morphine And Related Hives    Naprosyn [Naproxen] GI Intolerance    Ultram [Tramadol] GI Intolerance     Reviewed medications and allergies and updated as indicated    10 point review of systems was reviewed and was negative with exceptions noted in HPI       PHYSICAL EXAM: Blood pressure 108/60, pulse 68, height 6' (1 829 m), weight 74 8 kg (165 lb)  Body mass index is 22 38 kg/m²  General Appearance: NAD, cooperative, alert  Eyes: Anicteric, PERRLA, EOMI  ENT:  Normocephalic, atraumatic, normal mucosa  Neck:  Supple, symmetrical, trachea midline  Resp:  Clear to auscultation bilaterally; no rales, rhonchi or wheezing; respirations unlabored   CV:  S1 S2, Regular rate and rhythm; no murmur, rub, or gallop  GI:  Soft, non-tender, non-distended; normal bowel sounds; no masses, no organomegaly   Rectal: +large external hemorrhoid, non thrombosed, no active bleeding, +tenderness to rectum   Musculoskeletal: No cyanosis, clubbing or edema  Normal ROM  Skin:  No jaundice, rashes, or lesions   Heme/Lymph: No palpable cervical lymphadenopathy  Psych: Normal affect, good eye contact  Neuro: No gross deficits, AAOx3    Lab Results:   Lab Results   Component Value Date    WBC 3 41 (L) 01/08/2020    HGB 12 4 01/08/2020    HCT 38 0 01/08/2020    MCV 93 01/08/2020    PLT 58 (L) 01/08/2020     Lab Results   Component Value Date     08/27/2015    K 3 1 (L) 01/08/2020     01/08/2020    CO2 31 01/08/2020    ANIONGAP 12 08/27/2015    BUN 9 01/08/2020    CREATININE 0 88 01/08/2020    GLUCOSE 135 08/27/2015    GLUF 105 (H) 01/04/2020    CALCIUM 8 8 01/08/2020    AST 54 (H) 01/08/2020    ALT 35 01/08/2020    ALKPHOS 262 (H) 01/08/2020    PROT 6 9 08/27/2015    BILITOT 0 82 08/27/2015    EGFR 95 01/08/2020     No results found for: IRON, TIBC, FERRITIN  Lab Results   Component Value Date    LIPASE 133 01/03/2020       Radiology Results:   No results found

## 2020-01-08 NOTE — DISCHARGE INSTRUCTIONS
Please follow-up with your gastroenterologist as soon as possible for further care, if you developed worsening bleeding, chest pain or shortness of breath please return to the emergency department immediately    Your potassium was low on today's visit please follow up with your primary care provider for recheck of your potassium

## 2020-01-08 NOTE — PATIENT INSTRUCTIONS
Start taking Propranolol 10 mg daily   If you have significant recurrent bleeding go to [facility] ER   Use Preparation H and apply three times daily  Follow up with Colorectal surgeon-please call to make appointment    Increase fluid and fiber   Continue stool softener   Clear liquids today    Discontinue Metoprolol indefinitely

## 2020-01-24 ENCOUNTER — TELEPHONE (OUTPATIENT)
Dept: GASTROENTEROLOGY | Facility: CLINIC | Age: 60
End: 2020-01-24

## 2020-01-24 NOTE — TELEPHONE ENCOUNTER
Actual phone #[phone]  Patient contacted and he realized he had refills so he contacted the pharmacy

## 2020-01-24 NOTE — TELEPHONE ENCOUNTER
Pt left  mssg stating he needs water pills from Dr Jacqui Tomas; asks for [facility] [phone]    (ID's Pt by ph #)

## 2020-02-05 ENCOUNTER — TELEPHONE (OUTPATIENT)
Dept: GASTROENTEROLOGY | Facility: CLINIC | Age: 60
End: 2020-02-05

## 2020-02-05 NOTE — TELEPHONE ENCOUNTER
Pt juan luism w/service 2/5/20 7:31am-call pt-needs to cancel appt sched for today family emergency-call pt w/new date and time [phone]

## 2020-02-10 ENCOUNTER — OFFICE VISIT (OUTPATIENT)
Dept: GASTROENTEROLOGY | Facility: CLINIC | Age: 60
End: 2020-02-10
Payer: MEDICARE

## 2020-02-10 VITALS
HEART RATE: 80 BPM | HEIGHT: 72 IN | SYSTOLIC BLOOD PRESSURE: 138 MMHG | DIASTOLIC BLOOD PRESSURE: 82 MMHG | WEIGHT: 158 LBS | BODY MASS INDEX: 21.4 KG/M2

## 2020-02-10 DIAGNOSIS — Z12.11 SCREENING FOR COLON CANCER: ICD-10-CM

## 2020-02-10 DIAGNOSIS — R13.10 DYSPHAGIA, UNSPECIFIED TYPE: ICD-10-CM

## 2020-02-10 DIAGNOSIS — K70.9 LIVER DISEASE DUE TO ALCOHOL (HCC): Primary | ICD-10-CM

## 2020-02-10 DIAGNOSIS — K62.5 RECTAL BLEEDING: ICD-10-CM

## 2020-02-10 DIAGNOSIS — Z86.010 HISTORY OF COLON POLYPS: ICD-10-CM

## 2020-02-10 DIAGNOSIS — I85.00 ESOPHAGEAL VARICES WITHOUT BLEEDING, UNSPECIFIED ESOPHAGEAL VARICES TYPE (HCC): ICD-10-CM

## 2020-02-10 DIAGNOSIS — R18.8 OTHER ASCITES: ICD-10-CM

## 2020-02-10 PROCEDURE — 99214 OFFICE O/P EST MOD 30 MIN: CPT | Performed by: NURSE PRACTITIONER

## 2020-02-10 RX ORDER — OMEPRAZOLE 20 MG/1
20 CAPSULE, DELAYED RELEASE ORAL DAILY
Qty: 30 CAPSULE | Refills: 3 | Status: SHIPPED | OUTPATIENT
Start: 2020-02-10 | End: 2020-03-18 | Stop reason: SDUPTHER

## 2020-02-10 RX ORDER — PROPRANOLOL HYDROCHLORIDE 10 MG/1
10 TABLET ORAL DAILY
Qty: 30 TABLET | Refills: 3 | Status: SHIPPED | OUTPATIENT
Start: 2020-02-10 | End: 2020-03-18

## 2020-02-10 RX ORDER — OMEPRAZOLE 20 MG/1
20 CAPSULE, DELAYED RELEASE ORAL DAILY
COMMUNITY
Start: 2019-11-01 | End: 2020-02-10 | Stop reason: SDUPTHER

## 2020-02-10 RX ORDER — FUROSEMIDE 20 MG/1
40 TABLET ORAL DAILY
Qty: 60 TABLET | Refills: 2 | Status: SHIPPED | OUTPATIENT
Start: 2020-02-10 | End: 2020-05-06 | Stop reason: SDUPTHER

## 2020-02-10 RX ORDER — SPIRONOLACTONE 50 MG/1
50 TABLET, FILM COATED ORAL DAILY
Qty: 30 TABLET | Refills: 5 | Status: SHIPPED | OUTPATIENT
Start: 2020-02-10 | End: 2020-05-07

## 2020-02-10 NOTE — PROGRESS NOTES
[address] [facility] - Outpatient Follow-up Note  Parvez Garcia 63 y o  male MRN: [id number]  Encounter: [id number]    ASSESSMENT AND PLAN:      1  Rectal bleeding  History of rectal bleeding likely on the basis of internal and external hemorrhoids  He underwent hemorrhoid ligation x2 recently by Dr Noemy Ralph  Denies any further rectal bleeding  Last colonoscopy that was performed in November of 2019 showed internal and external hemorrhoids in addition to nonbleeding rectal varices  - he is to follow up with Dr Noemy Ralph, from colorectal surgery in the near future for another hemorrhoidal ligation    2  Esophageal varices without bleeding, unspecified esophageal varices type ([facility] )  Stage III nonbleeding esophageal varices noted on upper endoscopy performed in November of 2019  The patient is unclear as to whether he is taking his nadolol      - propranolol (INDERAL) 10 mg tablet; Take 1 tablet (10 mg total) by mouth daily  Dispense: 30 tablet; Refill: 3  - omeprazole (PriLOSEC) 20 mg delayed release capsule; Take 1 capsule (20 mg total) by mouth daily  Dispense: 30 capsule; Refill: 3    3  Dysphagia, unspecified type    Reports a new onset of dysphagia to solids over the past couple months  Interestingly he denies dysphagia to liquids but reports dysphagia to his own saliva  Exclude an esophageal stricture, Schatzki's ring or less likely an esophageal gastric neoplasm with recent upper endoscopy the end of last year showing grade 3 esophageal varices  Possibly secondary to varices  Will arrange for an upper endoscopy in a hospital setting  Need to check platelets, PT and INR prior to endoscopy  I currently do not have a recent platelet count or PT/INR available for my review  Platelets in 9/2383  resulted as 58 and INR 12/2019 was 1 46       4  Liver disease due to alcohol ([facility] )  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    Reports he has been abstinent from alcohol for over 2 years   There also was a question of prior history to hepatitis-C but most recent viral load was negative  Patient denies treatment and therefore he most of cleared the virus on his own, as liver biopsy from 2006 shows hepatitis C  decompensated with ascites, esophageal and rectal varices, nonbleeding, anemia, thrombocytopenia and coagulopathy       - CBC and differential; Future  - Comprehensive metabolic panel; Future  - Protime-INR; Future  - Ammonia; Future  - US right upper quadrant; Future  - alpha fetoprotein  - reinforced compliance with all medications    5  Other ascites  Last paracentesis was performed 12/2019 with 2 L of ascitic fluid removed  No evidence of SBP or malignancy  He has lost 7 lb over the past 6 weeks but believes his abdomen is gradually becoming distended  Will maximize diuretics and he may require a repeat paracentesis in the near future  - encouraged to maintain a 2 g sodium restricted diet and fluid restriction  - closely follow electrolytes and renal function  - furosemide (LASIX) 20 mg tablet; Take 2 tablets (40 mg total) by mouth daily  Dispense: 60 tablet; Refill: 2  - spironolactone (ALDACTONE) 50 mg tablet; Take 1 tablet (50 mg total) by mouth daily  Dispense: 30 tablet; Refill: 5    6  History of colon polyps  7  Screening for colon cancer  Last colonoscopy performed 11/23/2019 showed rectal varices, and internal hemorrhoids in addition to an adenomatous colon polyp  A 5 year recall colonoscopy was advised  Followup Appointment:  3-4 months  ______________________________________________________________________    Chief Complaint   Patient presents with    Follow up to colonoscopy    Dysphagia, rectal bleeding     HPI:    Returns to the office accompanied by his 2 daughters for issues with rectal bleeding  He was recently seen by  Dr Nubia Florentino, from colorectal surgery and underwent band ligation times two for internal hemorrhoids and a bedside BERTA in January 2020  No further rectal bleeding since that time  He has been experiencing increased weakness and fatigue and recently fell from a ladder last week  He did not sustain any significant injuries  He has lost 7 lb over the past 5-6 weeks  He is not clear as to all the medications he is taking and reports he may be non compliant with "some of them"  Some increased abdominal distention  Denies any lower extremity edema, confusion, change in bowel habits or melena  He does admit to dysphagia to solids and even his own saliva over the past couple months  Denies odynophagia, early satiety or anorexia  Denies any jaundice, pruritus, skin rashes or lesions  Historical Information   Past Medical History:   Diagnosis Date    Cardiac disease     Chronic left-sided headaches     Chronic pain     back and neck    COPD (chronic obstructive pulmonary disease) (HCC)     Hypertension      Past Surgical History:   Procedure Laterality Date    BACK SURGERY      CERVICAL FUSION      CHOLECYSTECTOMY      COLONOSCOPY  11/23/2019    Internal external hemorrhoids, nonbleeding rectal varices    CORONARY ANGIOPLASTY WITH STENT PLACEMENT      2006-PTCA/STENT to mid and distal RCA; then 2009 Left-LAD normal, circumflex-normal,Proximal % stnosis, Chronic total occlusion    EGD  01/11/2006    HERNIA REPAIR      KNEE SURGERY      NECK SURGERY      UPPER GASTROINTESTINAL ENDOSCOPY  01/11/2006    Gastritis and duodenitis    Pathology negative for any abnormality    UPPER GASTROINTESTINAL ENDOSCOPY  11/23/2019    Grade 3 esophageal varices and portal hypertensive gastropathy     Social History     Substance and Sexual Activity   Alcohol Use Not Currently    Comment: social     Social History     Substance and Sexual Activity   Drug Use No     Social History     Tobacco Use   Smoking Status Current Every Day Smoker    Types: Cigarettes   Smokeless Tobacco Never Used     Family History   Problem Relation Age of Onset    Heart disease Father     Heart disease Brother     Cardiomyopathy Brother     Colon polyps Neg Hx     Colon cancer Neg Hx          Current Outpatient Medications:     albuterol (PROVENTIL HFA,VENTOLIN HFA) 90 mcg/act inhaler    atorvastatin (LIPITOR) 40 mg tablet    buprenorphine-naloxone (SUBOXONE) 8-2 mg per SL tablet    furosemide (LASIX) 20 mg tablet    isosorbide mononitrate (IMDUR) 60 mg 24 hr tablet    methocarbamol (ROBAXIN) 750 mg tablet    omeprazole (PriLOSEC) 20 mg delayed release capsule    propranolol (INDERAL) 10 mg tablet    spironolactone (ALDACTONE) 50 mg tablet  Allergies   Allergen Reactions    Flexeril [Cyclobenzaprine] Other (See Comments)     Hallucination      Morphine And Related Hives    Naprosyn [Naproxen] GI Intolerance    Ultram [Tramadol] GI Intolerance     Reviewed medications and allergies and updated as indicated    PHYSICAL EXAM:    Blood pressure 138/82, pulse 80, height 6' (1 829 m), weight 71 7 kg (158 lb)  Body mass index is 21 43 kg/m²  General Appearance: NAD, appears older than stated age and chronically ill in appearance  Eyes: Anicteric  ENT:  Normocephalic, atraumatic, normal mucosa  Neck:  Supple, symmetrical, trachea midline  Resp:  Clear to auscultation bilaterally; no rales, rhonchi or wheezing; respirations unlabored   CV:  S1 S2, Regular rate and rhythm; no murmur, rub, or gallop  GI:  Soft, non-tender, mildly distended; normal bowel sounds; no masses, no organomegaly   Rectal: Deferred  Musculoskeletal: No cyanosis, clubbing or edema  Normal ROM    Skin:  No jaundice, rashes, or lesions   Heme/Lymph: No palpable cervical lymphadenopathy  Psych: Normal affect, good eye contact  Neuro: No gross deficits, AAOx3, no asterixis    Lab Results:   Lab Results   Component Value Date    WBC 3 41 (L) 01/08/2020    HGB 12 4 01/08/2020    HCT 38 0 01/08/2020    MCV 93 01/08/2020    PLT 58 (L) 01/08/2020     Lab Results   Component Value Date     08/27/2015    K 3 1 (L) 01/08/2020     01/08/2020    CO2 31 01/08/2020    ANIONGAP 12 08/27/2015    BUN 9 01/08/2020    CREATININE 0 88 01/08/2020    GLUCOSE 135 08/27/2015    GLUF 105 (H) 01/04/2020    CALCIUM 8 8 01/08/2020    AST 54 (H) 01/08/2020    ALT 35 01/08/2020    ALKPHOS 262 (H) 01/08/2020    PROT 6 9 08/27/2015    BILITOT 0 82 08/27/2015    EGFR 95 01/08/2020     No results found for: IRON, TIBC, FERRITIN  Lab Results   Component Value Date    LIPASE 133 01/03/2020       Radiology Results:   No results found

## 2020-02-11 LAB
AFP-TM SERPL-MCNC: 1.4 NG/ML
ALBUMIN SERPL-MCNC: 3.2 G/DL (ref 3.6–5.1)
ALBUMIN/GLOB SERPL: 1.1 (CALC) (ref 1–2.5)
ALP SERPL-CCNC: 153 U/L (ref 35–144)
ALT SERPL-CCNC: 13 U/L (ref 9–46)
AMMONIA PLAS-SCNC: 99 UMOL/L
AST SERPL-CCNC: 21 U/L (ref 10–35)
BASOPHILS # BLD AUTO: 9 CELLS/UL (ref 0–200)
BASOPHILS NFR BLD AUTO: 0.4 %
BILIRUB SERPL-MCNC: 0.8 MG/DL (ref 0.2–1.2)
BUN SERPL-MCNC: 7 MG/DL (ref 7–25)
BUN/CREAT SERPL: 11 (CALC) (ref 6–22)
CALCIUM SERPL-MCNC: 7.6 MG/DL (ref 8.6–10.3)
CHLORIDE SERPL-SCNC: 102 MMOL/L (ref 98–110)
CO2 SERPL-SCNC: 28 MMOL/L (ref 20–32)
CREAT SERPL-MCNC: 0.65 MG/DL (ref 0.7–1.33)
EOSINOPHIL # BLD AUTO: 30 CELLS/UL (ref 15–500)
EOSINOPHIL NFR BLD AUTO: 1.3 %
ERYTHROCYTE [DISTWIDTH] IN BLOOD BY AUTOMATED COUNT: 12.5 % (ref 11–15)
GLOBULIN SER CALC-MCNC: 3 G/DL (CALC) (ref 1.9–3.7)
GLUCOSE SERPL-MCNC: 115 MG/DL (ref 65–99)
HCT VFR BLD AUTO: 27.2 % (ref 38.5–50)
HGB BLD-MCNC: 9.1 G/DL (ref 13.2–17.1)
INR PPP: 1.2
LYMPHOCYTES # BLD AUTO: 380 CELLS/UL (ref 850–3900)
LYMPHOCYTES NFR BLD AUTO: 16.5 %
MCH RBC QN AUTO: 28.3 PG (ref 27–33)
MCHC RBC AUTO-ENTMCNC: 33.5 G/DL (ref 32–36)
MCV RBC AUTO: 84.7 FL (ref 80–100)
MONOCYTES # BLD AUTO: 99 CELLS/UL (ref 200–950)
MONOCYTES NFR BLD AUTO: 4.3 %
NEUTROPHILS # BLD AUTO: 1783 CELLS/UL (ref 1500–7800)
NEUTROPHILS NFR BLD AUTO: 77.5 %
PLATELET # BLD AUTO: 57 THOUSAND/UL (ref 140–400)
PMV BLD REES-ECKER: 12.4 FL (ref 7.5–12.5)
POTASSIUM SERPL-SCNC: 3.4 MMOL/L (ref 3.5–5.3)
PROT SERPL-MCNC: 6.2 G/DL (ref 6.1–8.1)
PROTHROMBIN TIME: 11.9 SEC (ref 9–11.5)
RBC # BLD AUTO: 3.21 MILLION/UL (ref 4.2–5.8)
SERVICE CMNT-IMP: ABNORMAL
SL AMB EGFR AFRICAN AMERICAN: 124 ML/MIN/1.73M2
SL AMB EGFR NON AFRICAN AMERICAN: 107 ML/MIN/1.73M2
SODIUM SERPL-SCNC: 137 MMOL/L (ref 135–146)
WBC # BLD AUTO: 2.3 THOUSAND/UL (ref 3.8–10.8)

## 2020-02-18 DIAGNOSIS — K70.30 ALCOHOLIC CIRRHOSIS, UNSPECIFIED WHETHER ASCITES PRESENT (HCC): Primary | ICD-10-CM

## 2020-02-18 RX ORDER — LACTULOSE 20 G/30ML
20 SOLUTION ORAL 2 TIMES DAILY
Qty: 1800 ML | Refills: 6 | Status: SHIPPED | OUTPATIENT
Start: 2020-02-18 | End: 2020-03-18 | Stop reason: ALTCHOICE

## 2020-02-18 RX ORDER — LACTULOSE 20 G/30ML
SOLUTION ORAL
Status: CANCELLED | OUTPATIENT
Start: 2020-02-18

## 2020-02-18 NOTE — TELEPHONE ENCOUNTER
Pt left  mssg stating he is at the pharmacy and no script  Called Pt back/advised Rx needs to be signed off; req'd he ck back w/ pharm later today or tomorrow katarzyna

## 2020-02-18 NOTE — RESULT ENCOUNTER NOTE
Results of blood work given to patient  He denies confusion  Told him I would like to repeat a CBC and ammonia level next week and sent to lab [facility]   EGD is scheduled in the next 2 weeks    I also added lactulose 30 cc twice a day to his medical regimen and E scribed to pharmacy

## 2020-02-18 NOTE — TELEPHONE ENCOUNTER
Spoke with patient  Rx went to wrong [address] [place] [address]  When I called to cancel rx was told the lactulose as sent over is not covered by insurance  They only cover liquid  I have updated the pharmacy  Could you please send over a corrected rx for liquid  Thanks

## 2020-02-18 NOTE — TELEPHONE ENCOUNTER
Pt left  mssg stating someone called and said they were sending a script to the pharmacy; asks for  [phone] because there was nothing at pharmacy

## 2020-02-18 NOTE — TELEPHONE ENCOUNTER
Results of lab work given to patient that he was anemic and has ammonia level was up  Repeat CBC and ammonia next week  Scripts sent to [facility] I also called in lactulose 30 cc b i d   To pharmacy

## 2020-02-20 LAB
AMMONIA PLAS-SCNC: 93 UMOL/L
BASOPHILS # BLD AUTO: 10 CELLS/UL (ref 0–200)
BASOPHILS NFR BLD AUTO: 0.3 %
EOSINOPHIL # BLD AUTO: 89 CELLS/UL (ref 15–500)
EOSINOPHIL NFR BLD AUTO: 2.7 %
ERYTHROCYTE [DISTWIDTH] IN BLOOD BY AUTOMATED COUNT: 13.1 % (ref 11–15)
HCT VFR BLD AUTO: 29.4 % (ref 38.5–50)
HGB BLD-MCNC: 9.8 G/DL (ref 13.2–17.1)
LYMPHOCYTES # BLD AUTO: 578 CELLS/UL (ref 850–3900)
LYMPHOCYTES NFR BLD AUTO: 17.5 %
MCH RBC QN AUTO: 27.9 PG (ref 27–33)
MCHC RBC AUTO-ENTMCNC: 33.3 G/DL (ref 32–36)
MCV RBC AUTO: 83.8 FL (ref 80–100)
MONOCYTES # BLD AUTO: 228 CELLS/UL (ref 200–950)
MONOCYTES NFR BLD AUTO: 6.9 %
NEUTROPHILS # BLD AUTO: 2396 CELLS/UL (ref 1500–7800)
NEUTROPHILS NFR BLD AUTO: 72.6 %
PLATELET # BLD AUTO: 87 THOUSAND/UL (ref 140–400)
PMV BLD REES-ECKER: 12 FL (ref 7.5–12.5)
RBC # BLD AUTO: 3.51 MILLION/UL (ref 4.2–5.8)
WBC # BLD AUTO: 3.3 THOUSAND/UL (ref 3.8–10.8)

## 2020-02-25 ENCOUNTER — OFFICE VISIT (OUTPATIENT)
Dept: GASTROENTEROLOGY | Facility: CLINIC | Age: 60
End: 2020-02-25
Payer: MEDICARE

## 2020-02-25 VITALS
DIASTOLIC BLOOD PRESSURE: 80 MMHG | SYSTOLIC BLOOD PRESSURE: 136 MMHG | BODY MASS INDEX: 19.5 KG/M2 | HEIGHT: 72 IN | WEIGHT: 144 LBS | HEART RATE: 74 BPM

## 2020-02-25 DIAGNOSIS — I85.00 ESOPHAGEAL VARICES WITHOUT BLEEDING, UNSPECIFIED ESOPHAGEAL VARICES TYPE (HCC): ICD-10-CM

## 2020-02-25 DIAGNOSIS — K70.9 LIVER DISEASE DUE TO ALCOHOL (HCC): ICD-10-CM

## 2020-02-25 DIAGNOSIS — D69.6 THROMBOCYTOPENIA (HCC): ICD-10-CM

## 2020-02-25 DIAGNOSIS — Z12.11 SCREENING FOR COLON CANCER: ICD-10-CM

## 2020-02-25 DIAGNOSIS — K72.90 HEPATIC ENCEPHALOPATHY (HCC): ICD-10-CM

## 2020-02-25 DIAGNOSIS — R13.10 DYSPHAGIA, UNSPECIFIED TYPE: Primary | ICD-10-CM

## 2020-02-25 DIAGNOSIS — R63.4 WEIGHT LOSS: ICD-10-CM

## 2020-02-25 DIAGNOSIS — Z86.010 HISTORY OF COLON POLYPS: ICD-10-CM

## 2020-02-25 DIAGNOSIS — K62.5 RECTAL BLEEDING: ICD-10-CM

## 2020-02-25 PROCEDURE — 99214 OFFICE O/P EST MOD 30 MIN: CPT | Performed by: NURSE PRACTITIONER

## 2020-02-25 NOTE — PROGRESS NOTES
[address] [facility] - Outpatient Follow-up Note  Kaden Swenson 63 y o  male MRN: [id number]  Encounter: [id number]    ASSESSMENT AND PLAN:      1  Dysphagia, unspecified type   Progressive dysphagia to solids over the past couple of months  Associated with a 20 lb weight loss over the past couple months  Unable to exclude an esophageal stricture, Schatzki's ring  Less likely an esophageal or gastric neoplasm with recent upper endoscopy the end of last year showing grade 3, nonbleeding esophageal varices  - arrange for an upper endoscopy in a hospital environment as soon as possible with possible dilation and esophageal banding    2  Esophageal varices without bleeding, unspecified esophageal varices type ([place] [address] )  Upper endoscopy performed 11/23/2019 showed grade 3 nonbleeding esophageal varices  Reinforced to the patient the importance of medical compliance  He is unclear as to whether he is taking his propanolol     - reinforced the importance of taking all medications to include propanolol and omeprazole    3  Liver disease due to alcohol ([place] [address] )  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  Decompensated on the basis of ascites, esophageal and rectal varices, anemia, thrombocytopenia and coagulopathy   Reports he has been abstinent from alcohol for over 2 years  There also was a question of prior history to hepatitis-C, although most recent viral load was negative  Patient denies treatment and therefore, he likely cleared the virus on his own, as liver biopsy from 2006 shows Hepatitis C  underwent a paracentesis in December of 2019 and reports compliance with diuretics  Recent alpha fetoprotein level within normal range  - continue diuretic  - continue lactulose  - continue propanolol  - [place] [address]  surveillance with blood work and abdominal ultrasound every 6 months    4  History of colon polyps  5   Screening for colon cancer    Last colonoscopy performed 11/23/2019 showed rectal varices, and internal hemorrhoids in addition to an adenomatous colon polyp  A 5 year recall colonoscopy was advised  6  Rectal bleeding  Resolved  Likely on the basis of internal and external hemorrhoids  A colonoscopy performed in November of 2019 showed internal external hemorrhoids in addition to nonbleeding rectal varices  He is to following  with Dr Nilay Johnson, from colorectal surgery and undergoing recurrent hemorrhoidal ligations  7  Thrombocytopenia ([address] )  Improved  Most recent platelet count 01T and a prior platelet count a few weeks earlier was 57k    8  Weight loss  He has lost 20 lb over the past couple months  He believes this is secondary to paracentesis in beginning on diuretic therapy the end of last year  Concern with a GI malignancy  - a recent upper and lower endoscopy was negative for any significant abnormalities  - check abdominal ultrasound  - check CT scan of the chest, abdomen and pelvis      9  Hepatic encephalopathy (HCC)  Ammonia level has been in the 90's although no signs of confusion or asterixis on today's examination  He tells me he is taking lactulose 3 times a day  He also tells me he is having at least 3 loose stools daily  Followup Appointment:  4-6 weeks  ______________________________________________________________________    Chief Complaint   Patient presents with    Dysphagia    Weight Loss     HPI:    Returns to the office for follow-up to cirrhosis  This is on the basis of alcohol but reports he has not had any alcohol in over 2 years  He believes he is  compliant with most his medications but is not entirely clear  He recently began taking diuretics at the end of last year when he developed a new onset of ascites  He also underwent a paracentesis at that time  He has had a 20 lb weight loss over the past 2 months but attributes this to the diuretics and paracentesis    New onset of solid-food dysphagia for the past several weeks that has been progressive over the past several days  Able to tolerate boost and liquids  Denies odynophagia, heartburn, nausea, vomiting, hematemesis, melena or rectal bleeding  Historical Information   Past Medical History:   Diagnosis Date    Cardiac disease     Chronic left-sided headaches     Chronic pain     back and neck    COPD (chronic obstructive pulmonary disease) (HCC)     Hypertension      Past Surgical History:   Procedure Laterality Date    BACK SURGERY      CERVICAL FUSION      CHOLECYSTECTOMY      COLONOSCOPY  11/23/2019    Internal external hemorrhoids, nonbleeding rectal varices    CORONARY ANGIOPLASTY WITH STENT PLACEMENT      2006-PTCA/STENT to mid and distal RCA; then 2009 Left-LAD normal, circumflex-normal,Proximal % stnosis, Chronic total occlusion    EGD  01/11/2006    HERNIA REPAIR      KNEE SURGERY      NECK SURGERY      UPPER GASTROINTESTINAL ENDOSCOPY  01/11/2006    Gastritis and duodenitis    Pathology negative for any abnormality    UPPER GASTROINTESTINAL ENDOSCOPY  11/23/2019    Grade 3 esophageal varices and portal hypertensive gastropathy     Social History     Substance and Sexual Activity   Alcohol Use Not Currently    Comment: social     Social History     Substance and Sexual Activity   Drug Use No     Social History     Tobacco Use   Smoking Status Current Every Day Smoker    Types: Cigarettes   Smokeless Tobacco Never Used     Family History   Problem Relation Age of Onset    Heart disease Father     Heart disease Brother     Cardiomyopathy Brother     Colon polyps Neg Hx     Colon cancer Neg Hx          Current Outpatient Medications:     albuterol (PROVENTIL HFA,VENTOLIN HFA) 90 mcg/act inhaler    buprenorphine-naloxone (SUBOXONE) 8-2 mg per SL tablet    furosemide (LASIX) 20 mg tablet    isosorbide mononitrate (IMDUR) 60 mg 24 hr tablet    omeprazole (PriLOSEC) 20 mg delayed release capsule    propranolol (INDERAL) 10 mg tablet    spironolactone (ALDACTONE) 50 mg tablet    atorvastatin (LIPITOR) 40 mg tablet    lactulose 20 g/30 mL    methocarbamol (ROBAXIN) 750 mg tablet  Allergies   Allergen Reactions    Flexeril [Cyclobenzaprine] Other (See Comments)     Hallucination      Morphine And Related Hives    Naprosyn [Naproxen] GI Intolerance    Ultram [Tramadol] GI Intolerance     Reviewed medications and allergies and updated as indicated  ROS- positive for fatigue, dysphagia, weight loss, cough, loose stools, painful joints, joint stiffness, muscle aches, chronic came content itching, discoloration, voluntary movements, loss of strength, anxiety and insomnia  Otherwise 10 point review of systems negative  PHYSICAL EXAM:    Blood pressure 136/80, pulse 74, height 6' (1 829 m), weight 65 3 kg (144 lb)  Body mass index is 19 53 kg/m²  General Appearance:  Appears older than stated age and chronically ill-appearing  Thin appearing  Eyes: Anicteric  ENT:  Normocephalic, atraumatic, normal mucosa  Neck:  Supple, symmetrical, trachea midline  Resp:  Clear to auscultation bilaterally; no rales, rhonchi or wheezing; respirations unlabored   CV:  S1 S2, Regular rate and rhythm; no murmur, rub, or gallop  GI:  Soft, non-tender, non-distended; normal bowel sounds; no masses, no organomegaly   Rectal: Deferred  Musculoskeletal: No cyanosis, clubbing or edema  Normal ROM    Skin:  No jaundice, rashes, or lesions   Heme/Lymph: No palpable cervical lymphadenopathy  Psych: Normal affect, good eye contact  Neuro: No gross deficits, AAOx3, no asterixis    Lab Results:   Lab Results   Component Value Date    WBC 3 3 (L) 02/19/2020    HGB 9 8 (L) 02/19/2020    HCT 29 4 (L) 02/19/2020    MCV 83 8 02/19/2020    PLT 87 (L) 02/19/2020     Lab Results   Component Value Date     08/27/2015    K 3 4 (L) 02/10/2020     02/10/2020    CO2 28 02/10/2020    ANIONGAP 12 08/27/2015    BUN 7 02/10/2020    CREATININE 0 65 (L) 02/10/2020    GLUCOSE 135 08/27/2015 GLUF 105 (H) 01/04/2020    CALCIUM 7 6 (L) 02/10/2020    AST 21 02/10/2020    ALT 13 02/10/2020    ALKPHOS 153 (H) 02/10/2020    PROT 6 9 08/27/2015    BILITOT 0 82 08/27/2015    EGFR 95 01/08/2020     No results found for: IRON, TIBC, FERRITIN  Lab Results   Component Value Date    LIPASE 133 01/03/2020       Radiology Results:   No results found

## 2020-02-25 NOTE — H&P (VIEW-ONLY)
[address] [facility] - Outpatient Follow-up Note  Kaia Kruger 63 y o  male MRN: [id number]  Encounter: [id number]    ASSESSMENT AND PLAN:      1  Dysphagia, unspecified type   Progressive dysphagia to solids over the past couple of months  Associated with a 20 lb weight loss over the past couple months  Unable to exclude an esophageal stricture, Schatzki's ring  Less likely an esophageal or gastric neoplasm with recent upper endoscopy the end of last year showing grade 3, nonbleeding esophageal varices  - arrange for an upper endoscopy in a hospital environment as soon as possible with possible dilation and esophageal banding    2  Esophageal varices without bleeding, unspecified esophageal varices type ([place] [address] )  Upper endoscopy performed 11/23/2019 showed grade 3 nonbleeding esophageal varices  Reinforced to the patient the importance of medical compliance  He is unclear as to whether he is taking his propanolol     - reinforced the importance of taking all medications to include propanolol and omeprazole    3  Liver disease due to alcohol ([place] [address] )  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  Decompensated on the basis of ascites, esophageal and rectal varices, anemia, thrombocytopenia and coagulopathy   Reports he has been abstinent from alcohol for over 2 years  There also was a question of prior history to hepatitis-C, although most recent viral load was negative  Patient denies treatment and therefore, he likely cleared the virus on his own, as liver biopsy from 2006 shows Hepatitis C  underwent a paracentesis in December of 2019 and reports compliance with diuretics  Recent alpha fetoprotein level within normal range  - continue diuretic  - continue lactulose  - continue propanolol  - [place] [address]  surveillance with blood work and abdominal ultrasound every 6 months    4  History of colon polyps  5   Screening for colon cancer    Last colonoscopy performed 11/23/2019 showed rectal varices, and internal hemorrhoids in addition to an adenomatous colon polyp  A 5 year recall colonoscopy was advised  6  Rectal bleeding  Resolved  Likely on the basis of internal and external hemorrhoids  A colonoscopy performed in November of 2019 showed internal external hemorrhoids in addition to nonbleeding rectal varices  He is to following  with Dr Tony Posey, from colorectal surgery and undergoing recurrent hemorrhoidal ligations  7  Thrombocytopenia ([address] )  Improved  Most recent platelet count 06J and a prior platelet count a few weeks earlier was 57k    8  Weight loss  He has lost 20 lb over the past couple months  He believes this is secondary to paracentesis in beginning on diuretic therapy the end of last year  Concern with a GI malignancy  - a recent upper and lower endoscopy was negative for any significant abnormalities  - check abdominal ultrasound  - check CT scan of the chest, abdomen and pelvis      9  Hepatic encephalopathy (HCC)  Ammonia level has been in the 90's although no signs of confusion or asterixis on today's examination  He tells me he is taking lactulose 3 times a day  He also tells me he is having at least 3 loose stools daily  Followup Appointment:  4-6 weeks  ______________________________________________________________________    Chief Complaint   Patient presents with    Dysphagia    Weight Loss     HPI:    Returns to the office for follow-up to cirrhosis  This is on the basis of alcohol but reports he has not had any alcohol in over 2 years  He believes he is  compliant with most his medications but is not entirely clear  He recently began taking diuretics at the end of last year when he developed a new onset of ascites  He also underwent a paracentesis at that time  He has had a 20 lb weight loss over the past 2 months but attributes this to the diuretics and paracentesis    New onset of solid-food dysphagia for the past several weeks that has been progressive over the past several days  Able to tolerate boost and liquids  Denies odynophagia, heartburn, nausea, vomiting, hematemesis, melena or rectal bleeding  Historical Information   Past Medical History:   Diagnosis Date    Cardiac disease     Chronic left-sided headaches     Chronic pain     back and neck    COPD (chronic obstructive pulmonary disease) (HCC)     Hypertension      Past Surgical History:   Procedure Laterality Date    BACK SURGERY      CERVICAL FUSION      CHOLECYSTECTOMY      COLONOSCOPY  11/23/2019    Internal external hemorrhoids, nonbleeding rectal varices    CORONARY ANGIOPLASTY WITH STENT PLACEMENT      2006-PTCA/STENT to mid and distal RCA; then 2009 Left-LAD normal, circumflex-normal,Proximal % stnosis, Chronic total occlusion    EGD  01/11/2006    HERNIA REPAIR      KNEE SURGERY      NECK SURGERY      UPPER GASTROINTESTINAL ENDOSCOPY  01/11/2006    Gastritis and duodenitis    Pathology negative for any abnormality    UPPER GASTROINTESTINAL ENDOSCOPY  11/23/2019    Grade 3 esophageal varices and portal hypertensive gastropathy     Social History     Substance and Sexual Activity   Alcohol Use Not Currently    Comment: social     Social History     Substance and Sexual Activity   Drug Use No     Social History     Tobacco Use   Smoking Status Current Every Day Smoker    Types: Cigarettes   Smokeless Tobacco Never Used     Family History   Problem Relation Age of Onset    Heart disease Father     Heart disease Brother     Cardiomyopathy Brother     Colon polyps Neg Hx     Colon cancer Neg Hx          Current Outpatient Medications:     albuterol (PROVENTIL HFA,VENTOLIN HFA) 90 mcg/act inhaler    buprenorphine-naloxone (SUBOXONE) 8-2 mg per SL tablet    furosemide (LASIX) 20 mg tablet    isosorbide mononitrate (IMDUR) 60 mg 24 hr tablet    omeprazole (PriLOSEC) 20 mg delayed release capsule    propranolol (INDERAL) 10 mg tablet    spironolactone (ALDACTONE) 50 mg tablet    atorvastatin (LIPITOR) 40 mg tablet    lactulose 20 g/30 mL    methocarbamol (ROBAXIN) 750 mg tablet  Allergies   Allergen Reactions    Flexeril [Cyclobenzaprine] Other (See Comments)     Hallucination      Morphine And Related Hives    Naprosyn [Naproxen] GI Intolerance    Ultram [Tramadol] GI Intolerance     Reviewed medications and allergies and updated as indicated  ROS- positive for fatigue, dysphagia, weight loss, cough, loose stools, painful joints, joint stiffness, muscle aches, chronic came content itching, discoloration, voluntary movements, loss of strength, anxiety and insomnia  Otherwise 10 point review of systems negative  PHYSICAL EXAM:    Blood pressure 136/80, pulse 74, height 6' (1 829 m), weight 65 3 kg (144 lb)  Body mass index is 19 53 kg/m²  General Appearance:  Appears older than stated age and chronically ill-appearing  Thin appearing  Eyes: Anicteric  ENT:  Normocephalic, atraumatic, normal mucosa  Neck:  Supple, symmetrical, trachea midline  Resp:  Clear to auscultation bilaterally; no rales, rhonchi or wheezing; respirations unlabored   CV:  S1 S2, Regular rate and rhythm; no murmur, rub, or gallop  GI:  Soft, non-tender, non-distended; normal bowel sounds; no masses, no organomegaly   Rectal: Deferred  Musculoskeletal: No cyanosis, clubbing or edema  Normal ROM    Skin:  No jaundice, rashes, or lesions   Heme/Lymph: No palpable cervical lymphadenopathy  Psych: Normal affect, good eye contact  Neuro: No gross deficits, AAOx3, no asterixis    Lab Results:   Lab Results   Component Value Date    WBC 3 3 (L) 02/19/2020    HGB 9 8 (L) 02/19/2020    HCT 29 4 (L) 02/19/2020    MCV 83 8 02/19/2020    PLT 87 (L) 02/19/2020     Lab Results   Component Value Date     08/27/2015    K 3 4 (L) 02/10/2020     02/10/2020    CO2 28 02/10/2020    ANIONGAP 12 08/27/2015    BUN 7 02/10/2020    CREATININE 0 65 (L) 02/10/2020    GLUCOSE 135 08/27/2015 GLUF 105 (H) 01/04/2020    CALCIUM 7 6 (L) 02/10/2020    AST 21 02/10/2020    ALT 13 02/10/2020    ALKPHOS 153 (H) 02/10/2020    PROT 6 9 08/27/2015    BILITOT 0 82 08/27/2015    EGFR 95 01/08/2020     No results found for: IRON, TIBC, FERRITIN  Lab Results   Component Value Date    LIPASE 133 01/03/2020       Radiology Results:   No results found

## 2020-02-26 ENCOUNTER — ANESTHESIA EVENT (OUTPATIENT)
Dept: GASTROENTEROLOGY | Facility: HOSPITAL | Age: 60
End: 2020-02-26

## 2020-02-26 NOTE — PRE-PROCEDURE INSTRUCTIONS
Pre-Surgery Instructions:   Medication Instructions    albuterol (PROVENTIL HFA,VENTOLIN HFA) 90 mcg/act inhaler Patient was instructed by Physician and understands   buprenorphine-naloxone (SUBOXONE) 8-2 mg per SL tablet Patient was instructed by Physician and understands   furosemide (LASIX) 20 mg tablet Patient was instructed by Physician and understands   omeprazole (PriLOSEC) 20 mg delayed release capsule Patient was instructed by Physician and understands   propranolol (INDERAL) 10 mg tablet Patient was instructed by Physician and understands   spironolactone (ALDACTONE) 50 mg tablet Patient was instructed by Physician and understands  Follow prep as per physician  You will receive a call with your time to arrive at the hospital   Secure transportation, you will be having anesthesia

## 2020-02-27 ENCOUNTER — HOSPITAL ENCOUNTER (OUTPATIENT)
Dept: GASTROENTEROLOGY | Facility: HOSPITAL | Age: 60
Setting detail: OUTPATIENT SURGERY
Discharge: HOME | End: 2020-02-27
Attending: INTERNAL MEDICINE | Admitting: INTERNAL MEDICINE
Payer: MEDICARE

## 2020-02-27 ENCOUNTER — ANESTHESIA (OUTPATIENT)
Dept: GASTROENTEROLOGY | Facility: HOSPITAL | Age: 60
End: 2020-02-27

## 2020-02-27 VITALS
BODY MASS INDEX: 19.91 KG/M2 | TEMPERATURE: 97.6 F | DIASTOLIC BLOOD PRESSURE: 66 MMHG | OXYGEN SATURATION: 100 % | HEIGHT: 72 IN | SYSTOLIC BLOOD PRESSURE: 144 MMHG | RESPIRATION RATE: 20 BRPM | WEIGHT: 147 LBS | HEART RATE: 73 BPM

## 2020-02-27 DIAGNOSIS — I85.10 SECONDARY ESOPHAGEAL VARICES WITHOUT BLEEDING (HCC): Primary | ICD-10-CM

## 2020-02-27 DIAGNOSIS — R13.10 DYSPHAGIA, UNSPECIFIED TYPE: ICD-10-CM

## 2020-02-27 DIAGNOSIS — I85.00 ESOPHAGEAL VARICES WITHOUT BLEEDING, UNSPECIFIED ESOPHAGEAL VARICES TYPE (HCC): ICD-10-CM

## 2020-02-27 PROCEDURE — 43244 EGD VARICES LIGATION: CPT | Performed by: INTERNAL MEDICINE

## 2020-02-27 RX ORDER — PROPRANOLOL HYDROCHLORIDE 20 MG/1
20 TABLET ORAL DAILY
Qty: 30 TABLET | Refills: 2 | Status: SHIPPED | OUTPATIENT
Start: 2020-02-27 | End: 2020-03-18

## 2020-02-27 RX ORDER — PROPOFOL 10 MG/ML
INJECTION, EMULSION INTRAVENOUS AS NEEDED
Status: DISCONTINUED | OUTPATIENT
Start: 2020-02-27 | End: 2020-02-27 | Stop reason: SURG

## 2020-02-27 RX ORDER — SODIUM CHLORIDE 9 MG/ML
20 INJECTION, SOLUTION INTRAVENOUS CONTINUOUS
Status: DISCONTINUED | OUTPATIENT
Start: 2020-02-27 | End: 2020-03-02 | Stop reason: HOSPADM

## 2020-02-27 RX ADMIN — PROPOFOL 40 MG: 10 INJECTION, EMULSION INTRAVENOUS at 07:50

## 2020-02-27 RX ADMIN — PROPOFOL 50 MG: 10 INJECTION, EMULSION INTRAVENOUS at 07:39

## 2020-02-27 RX ADMIN — PROPOFOL 100 MG: 10 INJECTION, EMULSION INTRAVENOUS at 07:36

## 2020-02-27 RX ADMIN — PROPOFOL 50 MG: 10 INJECTION, EMULSION INTRAVENOUS at 07:46

## 2020-02-27 RX ADMIN — SODIUM CHLORIDE 20 ML/HR: 0.9 INJECTION, SOLUTION INTRAVENOUS at 06:24

## 2020-02-27 NOTE — INTERVAL H&P NOTE
H&P reviewed  After examining the patient I find no changes in the patients condition since the H&P had been written      Vitals:    02/27/20 0618   BP: 140/72   Pulse: 88   Resp: 16   Temp: 98 5 °F (36 9 °C)   SpO2: 98%

## 2020-02-27 NOTE — ANESTHESIA PREPROCEDURE EVALUATION
Review of Systems/Medical History  Patient summary reviewed    No history of anesthetic complications     Cardiovascular  Hypertension , CAD ,    Pulmonary  Smoker cigarette smoker  , COPD ,        GI/Hepatic    Liver disease ,        Negative  ROS        Endo/Other  Negative endo/other ROS      GYN  Negative gynecology ROS          Hematology  Anemia ,     Musculoskeletal    Comment: c spine fusion      Neurology  Negative neurology ROS      Psychology           Physical Exam    Airway    Mallampati score: II  TM Distance: >3 FB  Neck ROM: limited     Dental       Cardiovascular  Rhythm: regular, Rate: normal, Cardiovascular exam normal    Pulmonary  Pulmonary exam normal Breath sounds clear to auscultation,     Other Findings        Anesthesia Plan  ASA Score- 3     Anesthesia Type- IV sedation with anesthesia with ASA Monitors  Additional Monitors:   Airway Plan:         Plan Factors-    Induction- intravenous  Postoperative Plan-     Informed Consent- Anesthetic plan and risks discussed with patient

## 2020-03-11 ENCOUNTER — TELEPHONE (OUTPATIENT)
Dept: GASTROENTEROLOGY | Facility: CLINIC | Age: 60
End: 2020-03-11

## 2020-03-18 ENCOUNTER — TELEPHONE (OUTPATIENT)
Dept: GASTROENTEROLOGY | Facility: CLINIC | Age: 60
End: 2020-03-18

## 2020-03-18 ENCOUNTER — HOSPITAL ENCOUNTER (INPATIENT)
Facility: HOSPITAL | Age: 60
LOS: 1 days | Discharge: LEFT BEFORE DISCHARGE | DRG: 378 | End: 2020-03-19
Attending: EMERGENCY MEDICINE | Admitting: HOSPITALIST
Payer: MEDICARE

## 2020-03-18 ENCOUNTER — OFFICE VISIT (OUTPATIENT)
Dept: GASTROENTEROLOGY | Facility: CLINIC | Age: 60
End: 2020-03-18
Payer: MEDICARE

## 2020-03-18 VITALS
DIASTOLIC BLOOD PRESSURE: 82 MMHG | SYSTOLIC BLOOD PRESSURE: 134 MMHG | HEART RATE: 76 BPM | HEIGHT: 72 IN | WEIGHT: 141 LBS | BODY MASS INDEX: 19.1 KG/M2

## 2020-03-18 DIAGNOSIS — D64.9 ANEMIA: ICD-10-CM

## 2020-03-18 DIAGNOSIS — R13.19 ESOPHAGEAL DYSPHAGIA: Primary | ICD-10-CM

## 2020-03-18 DIAGNOSIS — K72.90 HEPATIC ENCEPHALOPATHY (HCC): ICD-10-CM

## 2020-03-18 DIAGNOSIS — I85.10 SECONDARY ESOPHAGEAL VARICES WITHOUT BLEEDING (HCC): ICD-10-CM

## 2020-03-18 DIAGNOSIS — K62.5 GASTROINTESTINAL HEMORRHAGE ASSOCIATED WITH ANORECTAL SOURCE: Primary | ICD-10-CM

## 2020-03-18 DIAGNOSIS — I85.00 ESOPHAGEAL VARICES WITHOUT BLEEDING, UNSPECIFIED ESOPHAGEAL VARICES TYPE (HCC): ICD-10-CM

## 2020-03-18 DIAGNOSIS — K70.31 ASCITES DUE TO ALCOHOLIC CIRRHOSIS (HCC): ICD-10-CM

## 2020-03-18 DIAGNOSIS — D69.6 THROMBOCYTOPENIA (HCC): ICD-10-CM

## 2020-03-18 DIAGNOSIS — K92.2 GI BLEED: ICD-10-CM

## 2020-03-18 PROBLEM — E72.20 HYPERAMMONEMIA (HCC): Status: ACTIVE | Noted: 2020-03-18

## 2020-03-18 PROBLEM — K70.30 ALCOHOLIC CIRRHOSIS (HCC): Status: ACTIVE | Noted: 2020-01-03

## 2020-03-18 LAB
ABO GROUP BLD: NORMAL
ALBUMIN SERPL BCP-MCNC: 3 G/DL (ref 3.5–5)
ALP SERPL-CCNC: 187 U/L (ref 46–116)
ALT SERPL W P-5'-P-CCNC: 14 U/L (ref 12–78)
AMMONIA PLAS-SCNC: 65 UMOL/L (ref 11–35)
ANION GAP SERPL CALCULATED.3IONS-SCNC: 8 MMOL/L (ref 4–13)
APTT PPP: 33 SECONDS (ref 23–37)
AST SERPL W P-5'-P-CCNC: 21 U/L (ref 5–45)
BASOPHILS # BLD AUTO: 0.01 THOUSANDS/ΜL (ref 0–0.1)
BASOPHILS NFR BLD AUTO: 0 % (ref 0–1)
BILIRUB SERPL-MCNC: 0.7 MG/DL (ref 0.2–1)
BLD GP AB SCN SERPL QL: NEGATIVE
BUN SERPL-MCNC: 20 MG/DL (ref 5–25)
CALCIUM SERPL-MCNC: 8 MG/DL (ref 8.3–10.1)
CHLORIDE SERPL-SCNC: 103 MMOL/L (ref 100–108)
CO2 SERPL-SCNC: 24 MMOL/L (ref 21–32)
CREAT SERPL-MCNC: 0.96 MG/DL (ref 0.6–1.3)
EOSINOPHIL # BLD AUTO: 0.13 THOUSAND/ΜL (ref 0–0.61)
EOSINOPHIL NFR BLD AUTO: 2 % (ref 0–6)
ERYTHROCYTE [DISTWIDTH] IN BLOOD BY AUTOMATED COUNT: 14.9 % (ref 11.6–15.1)
GFR SERPL CREATININE-BSD FRML MDRD: 87 ML/MIN/1.73SQ M
GLUCOSE SERPL-MCNC: 109 MG/DL (ref 65–140)
HCT VFR BLD AUTO: 24.2 % (ref 36.5–49.3)
HGB BLD-MCNC: 7.4 G/DL (ref 12–17)
HGB BLD-MCNC: 7.8 G/DL (ref 12–17)
IMM GRANULOCYTES # BLD AUTO: 0.09 THOUSAND/UL (ref 0–0.2)
IMM GRANULOCYTES NFR BLD AUTO: 2 % (ref 0–2)
INR PPP: 1.32 (ref 0.84–1.19)
LYMPHOCYTES # BLD AUTO: 1.11 THOUSANDS/ΜL (ref 0.6–4.47)
LYMPHOCYTES NFR BLD AUTO: 20 % (ref 14–44)
MCH RBC QN AUTO: 26.8 PG (ref 26.8–34.3)
MCHC RBC AUTO-ENTMCNC: 32.2 G/DL (ref 31.4–37.4)
MCV RBC AUTO: 83 FL (ref 82–98)
MONOCYTES # BLD AUTO: 0.39 THOUSAND/ΜL (ref 0.17–1.22)
MONOCYTES NFR BLD AUTO: 7 % (ref 4–12)
NEUTROPHILS # BLD AUTO: 3.76 THOUSANDS/ΜL (ref 1.85–7.62)
NEUTS SEG NFR BLD AUTO: 69 % (ref 43–75)
NRBC BLD AUTO-RTO: 0 /100 WBCS
PLATELET # BLD AUTO: 112 THOUSANDS/UL (ref 149–390)
PMV BLD AUTO: 10.1 FL (ref 8.9–12.7)
POTASSIUM SERPL-SCNC: 4.1 MMOL/L (ref 3.5–5.3)
PROT SERPL-MCNC: 6.6 G/DL (ref 6.4–8.2)
PROTHROMBIN TIME: 16.1 SECONDS (ref 11.6–14.5)
RBC # BLD AUTO: 2.91 MILLION/UL (ref 3.88–5.62)
RH BLD: POSITIVE
SODIUM SERPL-SCNC: 135 MMOL/L (ref 136–145)
SPECIMEN EXPIRATION DATE: NORMAL
WBC # BLD AUTO: 5.49 THOUSAND/UL (ref 4.31–10.16)

## 2020-03-18 PROCEDURE — 36430 TRANSFUSION BLD/BLD COMPNT: CPT

## 2020-03-18 PROCEDURE — 86923 COMPATIBILITY TEST ELECTRIC: CPT

## 2020-03-18 PROCEDURE — 85018 HEMOGLOBIN: CPT | Performed by: NURSE PRACTITIONER

## 2020-03-18 PROCEDURE — 86900 BLOOD TYPING SEROLOGIC ABO: CPT | Performed by: EMERGENCY MEDICINE

## 2020-03-18 PROCEDURE — 86901 BLOOD TYPING SEROLOGIC RH(D): CPT | Performed by: EMERGENCY MEDICINE

## 2020-03-18 PROCEDURE — 85730 THROMBOPLASTIN TIME PARTIAL: CPT | Performed by: EMERGENCY MEDICINE

## 2020-03-18 PROCEDURE — P9016 RBC LEUKOCYTES REDUCED: HCPCS

## 2020-03-18 PROCEDURE — 80053 COMPREHEN METABOLIC PANEL: CPT | Performed by: EMERGENCY MEDICINE

## 2020-03-18 PROCEDURE — 36415 COLL VENOUS BLD VENIPUNCTURE: CPT | Performed by: EMERGENCY MEDICINE

## 2020-03-18 PROCEDURE — 86850 RBC ANTIBODY SCREEN: CPT | Performed by: EMERGENCY MEDICINE

## 2020-03-18 PROCEDURE — 99285 EMERGENCY DEPT VISIT HI MDM: CPT | Performed by: EMERGENCY MEDICINE

## 2020-03-18 PROCEDURE — 82140 ASSAY OF AMMONIA: CPT | Performed by: EMERGENCY MEDICINE

## 2020-03-18 PROCEDURE — 85025 COMPLETE CBC W/AUTO DIFF WBC: CPT | Performed by: EMERGENCY MEDICINE

## 2020-03-18 PROCEDURE — 99285 EMERGENCY DEPT VISIT HI MDM: CPT

## 2020-03-18 PROCEDURE — 99214 OFFICE O/P EST MOD 30 MIN: CPT | Performed by: INTERNAL MEDICINE

## 2020-03-18 PROCEDURE — 85610 PROTHROMBIN TIME: CPT | Performed by: EMERGENCY MEDICINE

## 2020-03-18 PROCEDURE — C9113 INJ PANTOPRAZOLE SODIUM, VIA: HCPCS | Performed by: NURSE PRACTITIONER

## 2020-03-18 RX ORDER — OMEPRAZOLE 20 MG/1
20 CAPSULE, DELAYED RELEASE ORAL DAILY
Qty: 30 CAPSULE | Refills: 3 | Status: SHIPPED | OUTPATIENT
Start: 2020-03-18 | End: 2020-05-06 | Stop reason: SDUPTHER

## 2020-03-18 RX ORDER — METHOCARBAMOL 500 MG/1
750 TABLET, FILM COATED ORAL 2 TIMES DAILY
Status: DISCONTINUED | OUTPATIENT
Start: 2020-03-18 | End: 2020-03-19 | Stop reason: HOSPADM

## 2020-03-18 RX ORDER — PROPRANOLOL HYDROCHLORIDE 20 MG/1
20 TABLET ORAL DAILY
Qty: 30 TABLET | Refills: 2 | Status: SHIPPED | OUTPATIENT
Start: 2020-03-18 | End: 2020-05-06 | Stop reason: SDUPTHER

## 2020-03-18 RX ORDER — LACTULOSE 20 G/30ML
20 SOLUTION ORAL DAILY
Status: DISCONTINUED | OUTPATIENT
Start: 2020-03-18 | End: 2020-03-19 | Stop reason: HOSPADM

## 2020-03-18 RX ORDER — SPIRONOLACTONE 25 MG/1
50 TABLET ORAL DAILY
Status: DISCONTINUED | OUTPATIENT
Start: 2020-03-19 | End: 2020-03-19 | Stop reason: HOSPADM

## 2020-03-18 RX ORDER — PROPRANOLOL HYDROCHLORIDE 20 MG/1
20 TABLET ORAL DAILY
Status: DISCONTINUED | OUTPATIENT
Start: 2020-03-19 | End: 2020-03-19 | Stop reason: HOSPADM

## 2020-03-18 RX ORDER — FUROSEMIDE 40 MG/1
40 TABLET ORAL DAILY
Status: DISCONTINUED | OUTPATIENT
Start: 2020-03-19 | End: 2020-03-19 | Stop reason: HOSPADM

## 2020-03-18 RX ORDER — ALBUTEROL SULFATE 90 UG/1
2 AEROSOL, METERED RESPIRATORY (INHALATION) AS NEEDED
Status: DISCONTINUED | OUTPATIENT
Start: 2020-03-18 | End: 2020-03-19 | Stop reason: HOSPADM

## 2020-03-18 RX ORDER — BUPRENORPHINE AND NALOXONE 8; 2 MG/1; MG/1
1 FILM, SOLUBLE BUCCAL; SUBLINGUAL DAILY
Status: DISCONTINUED | OUTPATIENT
Start: 2020-03-19 | End: 2020-03-19 | Stop reason: HOSPADM

## 2020-03-18 RX ORDER — ONDANSETRON 2 MG/ML
4 INJECTION INTRAMUSCULAR; INTRAVENOUS EVERY 6 HOURS PRN
Status: DISCONTINUED | OUTPATIENT
Start: 2020-03-18 | End: 2020-03-19 | Stop reason: HOSPADM

## 2020-03-18 RX ADMIN — SODIUM CHLORIDE 8 MG/HR: 9 INJECTION, SOLUTION INTRAVENOUS at 20:20

## 2020-03-18 RX ADMIN — SODIUM CHLORIDE 80 MG: 9 INJECTION, SOLUTION INTRAVENOUS at 19:39

## 2020-03-18 RX ADMIN — OCTREOTIDE ACETATE 25 MCG/HR: 500 INJECTION, SOLUTION INTRAVENOUS; SUBCUTANEOUS at 20:10

## 2020-03-18 RX ADMIN — LACTULOSE 20 G: 10 SOLUTION ORAL at 19:35

## 2020-03-18 NOTE — TELEPHONE ENCOUNTER
Pt states reports he was just seen/forgot to tell the Dr about rectal bldg  Pt reports since yesterday 10-11 times moved bowels/had bleeding   CB# [phone]

## 2020-03-18 NOTE — TELEPHONE ENCOUNTER
Called pt back, states the rectal bleeding started yesterday and happened approx  10-11 times with a bowel movement  Denies constipation or straining as states feels empty  Denies pain, fever, dizziness, chest pain, or SOB  He did have the labs done today at [address] [facility] is drinking plenty of liquids and soft foods as is having issues with his swallowing  Forgot to mention this issue at his apt today

## 2020-03-18 NOTE — ED PROVIDER NOTES
History  Chief Complaint   Patient presents with    Rectal Bleeding     Presents to ED for c/o rectal bleeding  States that he has 10 episodes since yesterday  Denies any pain  52-year-old male with a history of alcoholic cirrhosis, esophageal varices presents for evaluation of bright red blood per rectum that started yesterday  Patient has a history of a similar and that require transfusion  Patient is reporting 10+ episodes of bright red blood per rectum  Patient denies associated rectal pain or abdominal pain  No further symptoms at this time  Patient states he no longer drinks alcohol  Prior to Admission Medications   Prescriptions Last Dose Informant Patient Reported? Taking? albuterol (PROVENTIL HFA,VENTOLIN HFA) 90 mcg/act inhaler  Self Yes No   Sig: Inhale 2 puffs as needed for shortness of breath   buprenorphine-naloxone (SUBOXONE) 8-2 mg per SL tablet  Self Yes No   Sig: Place 1 tablet under the tongue   furosemide (LASIX) 20 mg tablet  Self No No   Sig: Take 2 tablets (40 mg total) by mouth daily   methocarbamol (ROBAXIN) 750 mg tablet  Self No No   Sig: Take 1 tablet (750 mg total) by mouth 2 (two) times a day     omeprazole (PriLOSEC) 20 mg delayed release capsule   No No   Sig: Take 1 capsule (20 mg total) by mouth daily   propranolol (INDERAL) 20 mg tablet   No No   Sig: Take 1 tablet (20 mg total) by mouth daily   spironolactone (ALDACTONE) 50 mg tablet  Self No No   Sig: Take 1 tablet (50 mg total) by mouth daily      Facility-Administered Medications: None       Past Medical History:   Diagnosis Date    Cardiac disease     Chronic left-sided headaches     Chronic pain     back and neck    Chronic pain disorder     COPD (chronic obstructive pulmonary disease) ([address] )     History of transfusion     Hypertension        Past Surgical History:   Procedure Laterality Date    BACK SURGERY      CERVICAL FUSION      CHOLECYSTECTOMY      COLONOSCOPY  11/23/2019    Internal external hemorrhoids, nonbleeding rectal varices    CORONARY ANGIOPLASTY WITH STENT PLACEMENT      2006-PTCA/STENT to mid and distal RCA; then 2009 Left-LAD normal, circumflex-normal,Proximal % stnosis, Chronic total occlusion    EGD  01/11/2006    HERNIA REPAIR      JOINT REPLACEMENT Left     knee    KNEE SURGERY      NECK SURGERY      [facility]ENTESIS      [facility]    UPPER GASTROINTESTINAL ENDOSCOPY  01/11/2006    Gastritis and duodenitis  Pathology negative for any abnormality    UPPER GASTROINTESTINAL ENDOSCOPY  11/23/2019    Grade 3 esophageal varices and portal hypertensive gastropathy       Family History   Problem Relation Age of Onset    Heart disease Father     Heart disease Brother     Cardiomyopathy Brother     Colon polyps Neg Hx     Colon cancer Neg Hx      I have reviewed and agree with the history as documented  E-Cigarette/Vaping    E-Cigarette Use Never User      E-Cigarette/Vaping Substances     Social History     Tobacco Use    Smoking status: Current Every Day Smoker     Packs/day: 1 00     Types: Cigarettes    Smokeless tobacco: Never Used    Tobacco comment: encouraged smoking cessation   Substance Use Topics    Alcohol use: Not Currently    Drug use: No       Review of Systems   Constitutional: Negative for chills, diaphoresis and fever  HENT: Negative for congestion and rhinorrhea  Eyes: Negative for pain and visual disturbance  Respiratory: Negative for cough, shortness of breath and wheezing  Cardiovascular: Negative for chest pain and leg swelling  Gastrointestinal: Positive for blood in stool  Negative for abdominal pain, diarrhea, nausea and vomiting  Genitourinary: Negative for difficulty urinating, dysuria, frequency and urgency  Musculoskeletal: Negative for back pain and neck pain  Skin: Negative for color change and rash  Neurological: Negative for syncope, numbness and headaches     All other systems reviewed and are negative  Physical Exam  Physical Exam   Constitutional: He is oriented to person, place, and time  He appears well-developed and well-nourished  HENT:   Head: Normocephalic and atraumatic  Eyes: Conjunctivae and EOM are normal    Neck: Normal range of motion  Neck supple  Cardiovascular: Normal rate and regular rhythm  Pulmonary/Chest: Effort normal and breath sounds normal  No respiratory distress  He has no wheezes  He has no rales  Abdominal: Soft  Bowel sounds are normal  There is no tenderness  There is no guarding  Musculoskeletal: Normal range of motion  He exhibits no edema or tenderness  Neurological: He is alert and oriented to person, place, and time  No cranial nerve deficit  Skin: Skin is warm  No erythema  Psychiatric: He has a normal mood and affect  His behavior is normal    Nursing note and vitals reviewed        Vital Signs  ED Triage Vitals [03/18/20 1432]   Temperature Pulse Respirations Blood Pressure SpO2   (!) 97 °F (36 1 °C) 74 18 132/70 99 %      Temp Source Heart Rate Source Patient Position - Orthostatic VS BP Location FiO2 (%)   Tympanic Monitor Sitting Left arm --      Pain Score       No Pain           Vitals:    03/18/20 1432 03/18/20 1704 03/18/20 1720   BP: 132/70 132/70 129/60   Pulse: 74 74 64   Patient Position - Orthostatic VS: Sitting  Sitting         Visual Acuity      ED Medications  Medications - No data to display    Diagnostic Studies  Results Reviewed     Procedure Component Value Units Date/Time    Protime-INR [[id number]]  (Abnormal) Collected:  03/18/20 1545    Lab Status:  Final result Specimen:  Blood from Arm, Right Updated:  03/18/20 1619     Protime 16 1 seconds      INR 1 32    APTT [[id number]]  (Normal) Collected:  03/18/20 1545    Lab Status:  Final result Specimen:  Blood from Arm, Right Updated:  03/18/20 1619     PTT 33 seconds     Comprehensive metabolic panel [[id number]]  (Abnormal) Collected:  03/18/20 1545    Lab Status:  Final result Specimen:  Blood from Arm, Right Updated:  03/18/20 1616     Sodium 135 mmol/L      Potassium 4 1 mmol/L      Chloride 103 mmol/L      CO2 24 mmol/L      ANION GAP 8 mmol/L      BUN 20 mg/dL      Creatinine 0 96 mg/dL      Glucose 109 mg/dL      Calcium 8 0 mg/dL      AST 21 U/L      ALT 14 U/L      Alkaline Phosphatase 187 U/L      Total Protein 6 6 g/dL      Albumin 3 0 g/dL      Total Bilirubin 0 70 mg/dL      eGFR 87 ml/min/1 73sq m     Narrative:       National Kidney Disease Foundation guidelines for Chronic Kidney Disease (CKD):     Stage 1 with normal or high GFR (GFR > 90 mL/min/1 73 square meters)    Stage 2 Mild CKD (GFR = 60-89 mL/min/1 73 square meters)    Stage 3A Moderate CKD (GFR = 45-59 mL/min/1 73 square meters)    Stage 3B Moderate CKD (GFR = 30-44 mL/min/1 73 square meters)    Stage 4 Severe CKD (GFR = 15-29 mL/min/1 73 square meters)    Stage 5 End Stage CKD (GFR <15 mL/min/1 73 square meters)  Note: GFR calculation is accurate only with a steady state creatinine    Ammonia [[id number]]  (Abnormal) Collected:  03/18/20 1545    Lab Status:  Final result Specimen:  Blood from Arm, Right Updated:  03/18/20 1611     Ammonia 65 umol/L     CBC and differential [[id number]]  (Abnormal) Collected:  03/18/20 1545    Lab Status:  Final result Specimen:  Blood from Arm, Right Updated:  03/18/20 1601     WBC 5 49 Thousand/uL      RBC 2 91 Million/uL      Hemoglobin 7 8 g/dL      Hematocrit 24 2 %      MCV 83 fL      MCH 26 8 pg      MCHC 32 2 g/dL      RDW 14 9 %      MPV 10 1 fL      Platelets 802 Thousands/uL      nRBC 0 /100 WBCs      Neutrophils Relative 69 %      Immat GRANS % 2 %      Lymphocytes Relative 20 %      Monocytes Relative 7 %      Eosinophils Relative 2 %      Basophils Relative 0 %      Neutrophils Absolute 3 76 Thousands/µL      Immature Grans Absolute 0 09 Thousand/uL      Lymphocytes Absolute 1 11 Thousands/µL      Monocytes Absolute 0 39 Thousand/µL      Eosinophils Absolute 0 13 Thousand/µL      Basophils Absolute 0 01 Thousands/µL                  No orders to display              Procedures  Procedures         ED Course                                 MDM  Number of Diagnoses or Management Options  Anemia:   GI bleed:   Diagnosis management comments: 61-year-old male presenting with bright red blood per rectum  Obtain labs, type and screen, coags, insert large bore IV  Likely admit for further evaluation and gastroenterology  Hemoglobin 7 8 from 12 2 months ago  Patient had active bloody bowel movement in emergency department, will administer 1 unit packed red blood cell  Written consent obtained  Admit to slim        Disposition  Final diagnoses:   GI bleed   Anemia     Time reflects when diagnosis was documented in both MDM as applicable and the Disposition within this note     Time User Action Codes Description Comment    3/18/2020  4:24 PM Phyllis Mart Add [K92 2] GI bleed     3/18/2020  4:24 PM Phyllis Mart Add [D64 9] Anemia       ED Disposition     ED Disposition Condition Date/Time Comment    Admit Stable Wed Mar 18, 2020  4:24 PM Case was discussed with ROD and the patient's admission status was agreed to be Admission Status: inpatient status to the service of Dr Melvia Cowden   Follow-up Information    None           No discharge procedures on file      PDMP Review       Value Time User    PDMP Reviewed  Yes 1/4/2020 11:59 AM Michelle Hunter MD          ED Provider  Electronically Signed by           Jeanne Tee DO  03/18/20 2938

## 2020-03-18 NOTE — ASSESSMENT & PLAN NOTE
Hemoglobin 7 8 which is down from 12 4 on 01/08/2020  Admits to having 1 episode BRBPR since arrival to ED     · Transfuse with 1 unit PRBCs  · Recheck hemoglobin 2 hours post transfusion and q 6 hours  · Transfuse as needed to keep hemoglobin greater than 8

## 2020-03-18 NOTE — ASSESSMENT & PLAN NOTE
Patient had for 4 bands placed on 02/25/2020  Due for repeat EGD 3-4 weeks after initial procedure  · Spoke with Dr Sunni Holman for EGD tomorrow

## 2020-03-18 NOTE — ASSESSMENT & PLAN NOTE
Ammonia level 65  Stated he stopped taking lactulose due to diarrhea  · Restart lactulose 20 g daily      · Continue to monitor ammonia level

## 2020-03-18 NOTE — ASSESSMENT & PLAN NOTE
Without exacerbation  · Encourage smoking cessation  Patient currently refusing Nicoderm patch  · Albuterol inhaler 2 puffs p r n

## 2020-03-18 NOTE — PATIENT INSTRUCTIONS
Endoscopy at [facility]   omeprazole ( Prilosec)  20 mg daily -  new script sent to pharmacy   Inderal ( propranolol) 20mg daily   To use up  your10 mg pills, okay to take 2 pills at once  Will send a new 20 mg script to your pharmacy

## 2020-03-18 NOTE — TELEPHONE ENCOUNTER
Await labs  If bleeding persists, particularly if he has anemia, he should go to the ER  If he is not anemic, he should schedule a follow-up with his colorectal surgeon, Fuentes Pyle, at [facility], he recent had rubber band ligation

## 2020-03-18 NOTE — H&P
H&P- Beto Ellison 1960, 63 y o  male MRN: [id number]    Unit/Bed#: -01 Encounter: [id number]    Primary Care Provider: Tanvi Heredia   Date and time admitted to hospital: 3/18/2020  3:07 PM        * GIB (gastrointestinal bleeding)  Assessment & Plan  Presents with 10+ episodes of BRBPR since yesterday  History esophageal varices which were banded on 02/25/2020 and rectal varices  History of lower GI bleed in January this year  Last seen by Dr Richter Angry in office today for dysphagia secondary to varices  · Place on clear liquid diet for now  · NPO after midnight for procedure  · Initiate Protonix drip and Octreotide drip as pt also admitted to "coughing up blood once today"  · Inpatient consult to GI    Acute blood loss anemia  Assessment & Plan  Hemoglobin 7 8 which is down from 12 4 on 01/08/2020  Admits to having 1 episode BRBPR since arrival to ED  · Transfuse with 1 unit PRBCs  · Recheck hemoglobin 2 hours post transfusion and q 6 hours  · Transfuse as needed to keep hemoglobin greater than 8    Alcoholic cirrhosis (HCC)  Assessment & Plan  · Continue Lasix 40 mg and spironolactone 50 mg daily    Esophageal varices determined by endoscopy [facility])  Assessment & Plan  Patient had for 4 bands placed on 02/25/2020  Due for repeat EGD 3-4 weeks after initial procedure  · Spoke with Dr Lucila Denny for EGD tomorrow  Hyperammonemia (HCC)  Assessment & Plan  Ammonia level 65  Stated he stopped taking lactulose due to diarrhea  · Restart lactulose 20 g daily  · Continue to monitor ammonia level    COPD (chronic obstructive pulmonary disease) (HCC)  Assessment & Plan  Without exacerbation  · Encourage smoking cessation  Patient currently refusing Nicoderm patch  · Albuterol inhaler 2 puffs p r n        VTE Prophylaxis: Active GI bleed  / sequential compression device   Code Status: Full code  POLST: There is no POLST form on file for this patient (pre-hospital)  Discussion with family: No family present    Anticipated Length of Stay:  Patient will be admitted on an Inpatient basis with an anticipated length of stay of  > 2 midnights  Justification for Hospital Stay: Close hemodynamic monitoring and blood transfusion    Total Time for Visit, including Counseling / Coordination of Care: 30 minutes  Greater than 50% of this total time spent on direct patient counseling and coordination of care  Chief Complaint:   Rectal bleeding    History of Present Illness:    Sandi Vazquez is a 63 y o  male with history of alcoholic cirrhosis , esophageal varices , and hypertension who presents with complaints approximately 10 episodes of BRBPR since yesterday  Patient has previous history of GI bleed  Last hospitalized 01/04/20 with similar symptoms  Records were obtained from [facility] which revealed extensive GI workup which esophageal varices and rectal varices  Bleeding was thought to be due to rectal varices during that admission and he was discharged home with stable hemoglobin of 10 5  Patient was seen by Dr Chas Rodriges 02/25/20 for dysphagia thought to be due to esophageal varices  Four bands were placed at that time  Patient was due for repeat EGD for further banding if needed which would be around this time  Last seen by Dr Chas Rodriges today  Blood work significant for hemoglobin 7 8  Vital signs stable  Dr Chas Rodriges notified  Review of Systems:    Review of Systems   Constitutional: Positive for activity change  Negative for appetite change, chills and fever  Respiratory: Negative for apnea, cough and shortness of breath  Cardiovascular: Negative for chest pain, palpitations and leg swelling  Gastrointestinal: Positive for anal bleeding and diarrhea  Negative for abdominal distention, abdominal pain, constipation, nausea and vomiting  Dysphagia   Genitourinary: Negative for dysuria  Musculoskeletal: Positive for back pain (recent tailbone fracture)     Neurological: Negative for seizures, facial asymmetry, weakness, light-headedness, numbness and headaches  Psychiatric/Behavioral: Negative for agitation and confusion  The patient is not nervous/anxious  All other systems reviewed and are negative  Past Medical and Surgical History:     Past Medical History:   Diagnosis Date    Cardiac disease     Chronic left-sided headaches     Chronic pain     back and neck    Chronic pain disorder     COPD (chronic obstructive pulmonary disease) (HCC)     History of transfusion     Hypertension        Past Surgical History:   Procedure Laterality Date    BACK SURGERY      CERVICAL FUSION      CHOLECYSTECTOMY      COLONOSCOPY  11/23/2019    Internal external hemorrhoids, nonbleeding rectal varices    CORONARY ANGIOPLASTY WITH STENT PLACEMENT      2006-PTCA/STENT to mid and distal RCA; then 2009 Left-LAD normal, circumflex-normal,Proximal % stnosis, Chronic total occlusion    EGD  01/11/2006    HERNIA REPAIR      JOINT REPLACEMENT Left     knee    KNEE SURGERY      NECK SURGERY      PARACENTESIS      [facility]    UPPER GASTROINTESTINAL ENDOSCOPY  01/11/2006    Gastritis and duodenitis  Pathology negative for any abnormality    UPPER GASTROINTESTINAL ENDOSCOPY  11/23/2019    Grade 3 esophageal varices and portal hypertensive gastropathy       Meds/Allergies:    Prior to Admission medications    Medication Sig Start Date End Date Taking? Authorizing Provider   albuterol (PROVENTIL HFA,VENTOLIN HFA) 90 mcg/act inhaler Inhale 2 puffs as needed for shortness of breath    Historical Provider, MD   buprenorphine-naloxone (SUBOXONE) 8-2 mg per SL tablet Place 1 tablet under the tongue    Historical Provider, MD   furosemide (LASIX) 20 mg tablet Take 2 tablets (40 mg total) by mouth daily 2/10/20   ROXANNE Feliciano   methocarbamol (ROBAXIN) 750 mg tablet Take 1 tablet (750 mg total) by mouth 2 (two) times a day   6/28/16   Tony Goff MD   omeprazole (PriLOSEC) 20 mg delayed release capsule Take 1 capsule (20 mg total) by mouth daily 3/18/20   Paula Base, DO   propranolol (INDERAL) 20 mg tablet Take 1 tablet (20 mg total) by mouth daily 3/18/20   Paula Base, DO   spironolactone (ALDACTONE) 50 mg tablet Take 1 tablet (50 mg total) by mouth daily 2/10/20   ROXANNE Cordova   lactulose 20 g/30 mL Take 30 mL (20 g total) by mouth 2 (two) times a day  Patient not taking: Reported on 3/18/2020 2/18/20 3/18/20  ROXANNE Cordova   omeprazole (PriLOSEC) 20 mg delayed release capsule Take 1 capsule (20 mg total) by mouth daily 2/10/20 3/18/20  ROXANNE Cordova   propranolol (INDERAL) 10 mg tablet Take 1 tablet (10 mg total) by mouth daily 2/10/20 3/18/20  ROXANNE Cordova   propranolol (INDERAL) 20 mg tablet Take 1 tablet (20 mg total) by mouth daily 2/27/20 3/18/20  Paula Base, DO     I have reviewed home medications with patient personally  Allergies:    Allergies   Allergen Reactions    Flexeril [Cyclobenzaprine] Hallucinations           Morphine And Related Hives    Naprosyn [Naproxen] GI Intolerance    Ultram [Tramadol] GI Intolerance       Social History:     Marital Status:    Occupation: Disabled  Patient Pre-hospital Living Situation: Lives with wife and kids  Patient Pre-hospital Level of Mobility: Using crutches   Patient Pre-hospital Diet Restrictions: None  Substance Use History:   Social History     Substance and Sexual Activity   Alcohol Use Not Currently     Social History     Tobacco Use   Smoking Status Current Every Day Smoker    Packs/day: 1 00    Types: Cigarettes   Smokeless Tobacco Never Used   Tobacco Comment    encouraged smoking cessation     Social History     Substance and Sexual Activity   Drug Use No       Family History:    non-contributory    Physical Exam:     Vitals:   Blood Pressure: 129/60 (03/18/20 1720)  Pulse: 64 (03/18/20 1720)  Temperature: 97 8 °F (36 6 °C) (03/18/20 1720)  Temp Source: Temporal (03/18/20 1720)  Respirations: 16 (03/18/20 1720)  Weight - Scale: 64 kg (141 lb) (03/18/20 1432)  SpO2: 100 % (03/18/20 1720)    Physical Exam   Constitutional: He is oriented to person, place, and time  He appears well-developed and well-nourished  He is cooperative  No distress  HENT:   Head: Normocephalic and atraumatic  Eyes: Pupils are equal, round, and reactive to light  EOM are normal    Neck: Normal range of motion  Neck supple  Cardiovascular: Normal rate, regular rhythm, normal heart sounds and intact distal pulses  Exam reveals no gallop and no friction rub  No murmur heard  Pulmonary/Chest: Effort normal and breath sounds normal  No respiratory distress  He has no wheezes  He has no rales  Abdominal: Soft  Bowel sounds are normal  He exhibits no distension, no fluid wave and no ascites  There is no tenderness  Musculoskeletal: Normal range of motion  He exhibits no edema  Neurological: He is alert and oriented to person, place, and time  Skin: Skin is warm and dry  Psychiatric: He has a normal mood and affect  Judgment normal    Nursing note and vitals reviewed  Additional Data:     Lab Results: I have personally reviewed pertinent reports        Results from last 7 days   Lab Units 03/18/20  1545   WBC Thousand/uL 5 49   HEMOGLOBIN g/dL 7 8*   HEMATOCRIT % 24 2*   PLATELETS Thousands/uL 112*   NEUTROS PCT % 69   LYMPHS PCT % 20   MONOS PCT % 7   EOS PCT % 2     Results from last 7 days   Lab Units 03/18/20  1545   SODIUM mmol/L 135*   POTASSIUM mmol/L 4 1   CHLORIDE mmol/L 103   CO2 mmol/L 24   BUN mg/dL 20   CREATININE mg/dL 0 96   ANION GAP mmol/L 8   CALCIUM mg/dL 8 0*   ALBUMIN g/dL 3 0*   TOTAL BILIRUBIN mg/dL 0 70   ALK PHOS U/L 187*   ALT U/L 14   AST U/L 21   GLUCOSE RANDOM mg/dL 109     Results from last 7 days   Lab Units 03/18/20  1545   INR  1 32*                   EKG, Pathology, and Other Studies Reviewed on Admission:   · EKG: SR    Allscripts / Epic Records Reviewed: Yes     ** Please Note: This note has been constructed using a voice recognition system   **

## 2020-03-18 NOTE — TELEPHONE ENCOUNTER
Patient called in again  I spoke with patient, he continues to have bowel movements  I asked what color? He reports they are dark red/almost black in color  +weakness  I advised he go to the ED   He is agreeable to go to [facility]

## 2020-03-18 NOTE — ASSESSMENT & PLAN NOTE
Presents with 10+ episodes of BRBPR since yesterday  History esophageal varices which were banded on 02/25/2020 and rectal varices  History of lower GI bleed in January this year  Last seen by Dr Elias Mayo in office today for dysphagia secondary to varices  · Place on clear liquid diet for now  · NPO after midnight for procedure  · Initiate Protonix drip and Octreotide drip as pt also admitted to "coughing up blood once today"    · Inpatient consult to GI

## 2020-03-18 NOTE — PROGRESS NOTES
[address] [facility] - Outpatient Follow-up Note  Destiny Madden 63 y o  male MRN: [id number]  Encounter: [id number]    ASSESSMENT AND PLAN:      1  Esophageal dysphagia  Improved significantly with banding varices, now recurrent  Continue PPI  Propranolol was increased post procedure but he did not  the script, I recent prescription  Repeat EGD with banding  If symptoms fail to improve with banding will arrange a barium swallow  2  Secondary esophageal varices without bleeding ([address] )  Grade 3 varices February 2020  Treated with rubber band ligation  Increase propranolol to 20 mg daily  - propranolol (INDERAL) 20 mg tablet; Take 1 tablet (20 mg total) by mouth daily  Dispense: 30 tablet; Refill: 2    3  Hepatic encephalopathy (HCC)  Self-discontinued lactulose due to diarrhea  No encephalopathy on exam today, observe off medications    4  Ascites due to alcoholic cirrhosis (HCC)  Well managed with Lasix/spironolactone  Check BMP    5  Thrombocytopenia  Due to liver disease  Check coags and platelet count prior to endoscopy  Patient will obtain labs today  Followup Appointment:  EGD ASAP  ______________________________________________________________________    Chief Complaint   Patient presents with    Dysphagia     HPI:  The patient presents for dysphagia  He was last seen at the time of an upper endoscopy for dysphagia February 25th  This revealed large varices that were banded  No other etiology of dysphagia was identified  Had immediate relief of dysphagia but  Over the ensuing days had difficulty swallowing hamburger meat  He is frustrated the symptoms are back  He denies any nausea or vomiting  He denies any heartburn symptoms  He self-discontinued lactulose due to diarrhea  He denies any edema or recurrence of ascites  He has had no fevers, chills or cough      Historical Information   Past Medical History:   Diagnosis Date    Cardiac disease     Chronic left-sided headaches     Chronic pain     back and neck    Chronic pain disorder     COPD (chronic obstructive pulmonary disease) (HCC)     History of transfusion     Hypertension      Past Surgical History:   Procedure Laterality Date    BACK SURGERY      CERVICAL FUSION      CHOLECYSTECTOMY      COLONOSCOPY  11/23/2019    Internal external hemorrhoids, nonbleeding rectal varices    CORONARY ANGIOPLASTY WITH STENT PLACEMENT      2006-PTCA/STENT to mid and distal RCA; then 2009 Left-LAD normal, circumflex-normal,Proximal % stnosis, Chronic total occlusion    EGD  01/11/2006    HERNIA REPAIR      JOINT REPLACEMENT Left     knee    KNEE SURGERY      NECK SURGERY      [facility]ACENTESIS      [facility]    UPPER GASTROINTESTINAL ENDOSCOPY  01/11/2006    Gastritis and duodenitis    Pathology negative for any abnormality    UPPER GASTROINTESTINAL ENDOSCOPY  11/23/2019    Grade 3 esophageal varices and portal hypertensive gastropathy     Social History     Substance and Sexual Activity   Alcohol Use Not Currently     Social History     Substance and Sexual Activity   Drug Use No     Social History     Tobacco Use   Smoking Status Current Every Day Smoker    Types: Cigarettes   Smokeless Tobacco Never Used   Tobacco Comment    encouraged smoking cessation     Family History   Problem Relation Age of Onset    Heart disease Father     Heart disease Brother     Cardiomyopathy Brother     Colon polyps Neg Hx     Colon cancer Neg Hx          Current Outpatient Medications:     albuterol (PROVENTIL HFA,VENTOLIN HFA) 90 mcg/act inhaler    buprenorphine-naloxone (SUBOXONE) 8-2 mg per SL tablet    furosemide (LASIX) 20 mg tablet    methocarbamol (ROBAXIN) 750 mg tablet    omeprazole (PriLOSEC) 20 mg delayed release capsule    propranolol (INDERAL) 20 mg tablet    spironolactone (ALDACTONE) 50 mg tablet  Allergies   Allergen Reactions    Flexeril [Cyclobenzaprine] Hallucinations           Morphine And Related Hives    Naprosyn [Naproxen] GI Intolerance    Ultram [Tramadol] GI Intolerance     Reviewed medications and allergies and updated as indicated    PHYSICAL EXAM:    Blood pressure 134/82, pulse 76, height 6' (1 829 m), weight 64 kg (141 lb)  Body mass index is 19 12 kg/m²  General Appearance: NAD, cooperative, alert  Eyes: Anicteric, PERRLA, EOMI  ENT:  Normocephalic, atraumatic, normal mucosa  Neck:  Supple, symmetrical, trachea midline  Resp:  Clear to auscultation bilaterally; no rales, rhonchi or wheezing; respirations unlabored   CV:  S1 S2, Regular rate and rhythm; no murmur, rub, or gallop  GI:  Soft, non-tender, non-distended; normal bowel sounds; no masses, no organomegaly   Rectal: Deferred  Musculoskeletal: No cyanosis, clubbing or edema  Normal ROM  Skin:  No jaundice, rashes, or lesions   Heme/Lymph: No palpable cervical lymphadenopathy  Psych: Normal affect, good eye contact  Neuro: No gross deficits, AAOx3    Lab Results:   Lab Results   Component Value Date    WBC 3 3 (L) 02/19/2020    HGB 9 8 (L) 02/19/2020    HCT 29 4 (L) 02/19/2020    MCV 83 8 02/19/2020    PLT 87 (L) 02/19/2020     Lab Results   Component Value Date     08/27/2015    K 3 4 (L) 02/10/2020     02/10/2020    CO2 28 02/10/2020    ANIONGAP 12 08/27/2015    BUN 7 02/10/2020    CREATININE 0 65 (L) 02/10/2020    GLUCOSE 135 08/27/2015    GLUF 105 (H) 01/04/2020    CALCIUM 7 6 (L) 02/10/2020    AST 21 02/10/2020    ALT 13 02/10/2020    ALKPHOS 153 (H) 02/10/2020    PROT 6 9 08/27/2015    BILITOT 0 82 08/27/2015    EGFR 95 01/08/2020     No results found for: IRON, TIBC, FERRITIN  Lab Results   Component Value Date    LIPASE 133 01/03/2020       Radiology Results:   No results found

## 2020-03-19 ENCOUNTER — ANESTHESIA EVENT (INPATIENT)
Dept: GASTROENTEROLOGY | Facility: HOSPITAL | Age: 60
DRG: 378 | End: 2020-03-19
Payer: MEDICARE

## 2020-03-19 ENCOUNTER — ANESTHESIA (INPATIENT)
Dept: GASTROENTEROLOGY | Facility: HOSPITAL | Age: 60
DRG: 378 | End: 2020-03-19
Payer: MEDICARE

## 2020-03-19 ENCOUNTER — APPOINTMENT (OUTPATIENT)
Dept: GASTROENTEROLOGY | Facility: HOSPITAL | Age: 60
DRG: 378 | End: 2020-03-19
Payer: MEDICARE

## 2020-03-19 VITALS
OXYGEN SATURATION: 100 % | TEMPERATURE: 98 F | SYSTOLIC BLOOD PRESSURE: 122 MMHG | HEIGHT: 72 IN | HEART RATE: 54 BPM | WEIGHT: 140.98 LBS | BODY MASS INDEX: 19.1 KG/M2 | DIASTOLIC BLOOD PRESSURE: 85 MMHG | RESPIRATION RATE: 20 BRPM

## 2020-03-19 PROBLEM — F11.20 OPIOID DEPENDENCE (HCC): Status: ACTIVE | Noted: 2020-03-19

## 2020-03-19 PROBLEM — R63.6 UNDERWEIGHT: Status: ACTIVE | Noted: 2020-03-19

## 2020-03-19 LAB
ABO GROUP BLD BPU: NORMAL
ALBUMIN SERPL BCP-MCNC: 2.6 G/DL (ref 3.5–5)
ALBUMIN SERPL-MCNC: 3.2 G/DL (ref 3.6–5.1)
ALBUMIN/GLOB SERPL: 1.2 (CALC) (ref 1–2.5)
ALP SERPL-CCNC: 161 U/L (ref 46–116)
ALP SERPL-CCNC: 164 U/L (ref 35–144)
ALT SERPL W P-5'-P-CCNC: 11 U/L (ref 12–78)
ALT SERPL-CCNC: 9 U/L (ref 9–46)
ANION GAP SERPL CALCULATED.3IONS-SCNC: 7 MMOL/L (ref 4–13)
AST SERPL W P-5'-P-CCNC: 16 U/L (ref 5–45)
AST SERPL-CCNC: 14 U/L (ref 10–35)
BILIRUB SERPL-MCNC: 0.8 MG/DL (ref 0.2–1.2)
BILIRUB SERPL-MCNC: 0.9 MG/DL (ref 0.2–1)
BPU ID: NORMAL
BUN SERPL-MCNC: 13 MG/DL (ref 5–25)
BUN SERPL-MCNC: 19 MG/DL (ref 7–25)
BUN/CREAT SERPL: ABNORMAL (CALC) (ref 6–22)
CALCIUM SERPL-MCNC: 7.8 MG/DL (ref 8.3–10.1)
CALCIUM SERPL-MCNC: 8.3 MG/DL (ref 8.6–10.3)
CHLORIDE SERPL-SCNC: 105 MMOL/L (ref 98–110)
CHLORIDE SERPL-SCNC: 106 MMOL/L (ref 100–108)
CO2 SERPL-SCNC: 23 MMOL/L (ref 21–32)
CO2 SERPL-SCNC: 26 MMOL/L (ref 20–32)
CREAT SERPL-MCNC: 0.88 MG/DL (ref 0.6–1.3)
CREAT SERPL-MCNC: 0.9 MG/DL (ref 0.7–1.33)
CROSSMATCH: NORMAL
ERYTHROCYTE [DISTWIDTH] IN BLOOD BY AUTOMATED COUNT: 14.2 % (ref 11–15)
ERYTHROCYTE [DISTWIDTH] IN BLOOD BY AUTOMATED COUNT: 16 % (ref 11.6–15.1)
GFR SERPL CREATININE-BSD FRML MDRD: 95 ML/MIN/1.73SQ M
GLOBULIN SER CALC-MCNC: 2.7 G/DL (CALC) (ref 1.9–3.7)
GLUCOSE SERPL-MCNC: 100 MG/DL (ref 65–140)
GLUCOSE SERPL-MCNC: 115 MG/DL (ref 65–99)
HCT VFR BLD AUTO: 23 % (ref 36.5–49.3)
HCT VFR BLD AUTO: 24 % (ref 38.5–50)
HGB BLD-MCNC: 7.3 G/DL (ref 12–17)
HGB BLD-MCNC: 7.6 G/DL (ref 12–17)
HGB BLD-MCNC: 8 G/DL (ref 13.2–17.1)
HGB BLD-MCNC: 8.5 G/DL (ref 12–17)
INR PPP: 1.2
MAGNESIUM SERPL-MCNC: 1.6 MG/DL (ref 1.6–2.6)
MCH RBC QN AUTO: 27 PG (ref 27–33)
MCH RBC QN AUTO: 27.2 PG (ref 26.8–34.3)
MCHC RBC AUTO-ENTMCNC: 33 G/DL (ref 31.4–37.4)
MCHC RBC AUTO-ENTMCNC: 33.3 G/DL (ref 32–36)
MCV RBC AUTO: 81.1 FL (ref 80–100)
MCV RBC AUTO: 82 FL (ref 82–98)
PLATELET # BLD AUTO: 115 THOUSAND/UL (ref 140–400)
PLATELET # BLD AUTO: 71 THOUSANDS/UL (ref 149–390)
PMV BLD AUTO: 10.6 FL (ref 8.9–12.7)
PMV BLD REES-ECKER: 10.9 FL (ref 7.5–12.5)
POTASSIUM SERPL-SCNC: 4 MMOL/L (ref 3.5–5.3)
POTASSIUM SERPL-SCNC: 4.1 MMOL/L (ref 3.5–5.3)
PROT SERPL-MCNC: 5.8 G/DL (ref 6.4–8.2)
PROT SERPL-MCNC: 5.9 G/DL (ref 6.1–8.1)
PROTHROMBIN TIME: 11.9 SEC (ref 9–11.5)
RBC # BLD AUTO: 2.79 MILLION/UL (ref 3.88–5.62)
RBC # BLD AUTO: 2.96 MILLION/UL (ref 4.2–5.8)
SL AMB EGFR AFRICAN AMERICAN: 109 ML/MIN/1.73M2
SL AMB EGFR NON AFRICAN AMERICAN: 94 ML/MIN/1.73M2
SODIUM SERPL-SCNC: 136 MMOL/L (ref 136–145)
SODIUM SERPL-SCNC: 137 MMOL/L (ref 135–146)
UNIT DISPENSE STATUS: NORMAL
UNIT PRODUCT CODE: NORMAL
UNIT RH: NORMAL
WBC # BLD AUTO: 2.04 THOUSAND/UL (ref 4.31–10.16)
WBC # BLD AUTO: 5 THOUSAND/UL (ref 3.8–10.8)

## 2020-03-19 PROCEDURE — RECHECK: Performed by: HOSPITALIST

## 2020-03-19 PROCEDURE — 85027 COMPLETE CBC AUTOMATED: CPT | Performed by: NURSE PRACTITIONER

## 2020-03-19 PROCEDURE — 85018 HEMOGLOBIN: CPT | Performed by: NURSE PRACTITIONER

## 2020-03-19 PROCEDURE — 06L38CZ OCCLUSION OF ESOPHAGEAL VEIN WITH EXTRALUMINAL DEVICE, VIA NATURAL OR ARTIFICIAL OPENING ENDOSCOPIC: ICD-10-PCS | Performed by: INTERNAL MEDICINE

## 2020-03-19 PROCEDURE — 45330 DIAGNOSTIC SIGMOIDOSCOPY: CPT | Performed by: INTERNAL MEDICINE

## 2020-03-19 PROCEDURE — 43244 EGD VARICES LIGATION: CPT | Performed by: INTERNAL MEDICINE

## 2020-03-19 PROCEDURE — 80053 COMPREHEN METABOLIC PANEL: CPT | Performed by: NURSE PRACTITIONER

## 2020-03-19 PROCEDURE — 0DJD8ZZ INSPECTION OF LOWER INTESTINAL TRACT, VIA NATURAL OR ARTIFICIAL OPENING ENDOSCOPIC: ICD-10-PCS | Performed by: INTERNAL MEDICINE

## 2020-03-19 PROCEDURE — 99239 HOSP IP/OBS DSCHRG MGMT >30: CPT | Performed by: HOSPITALIST

## 2020-03-19 PROCEDURE — 99221 1ST HOSP IP/OBS SF/LOW 40: CPT | Performed by: INTERNAL MEDICINE

## 2020-03-19 PROCEDURE — C9113 INJ PANTOPRAZOLE SODIUM, VIA: HCPCS | Performed by: NURSE PRACTITIONER

## 2020-03-19 PROCEDURE — 83735 ASSAY OF MAGNESIUM: CPT | Performed by: NURSE PRACTITIONER

## 2020-03-19 RX ORDER — PROPOFOL 10 MG/ML
INJECTION, EMULSION INTRAVENOUS AS NEEDED
Status: DISCONTINUED | OUTPATIENT
Start: 2020-03-19 | End: 2020-03-19 | Stop reason: SURG

## 2020-03-19 RX ORDER — SODIUM CHLORIDE 9 MG/ML
INJECTION, SOLUTION INTRAVENOUS CONTINUOUS PRN
Status: DISCONTINUED | OUTPATIENT
Start: 2020-03-19 | End: 2020-03-19 | Stop reason: SURG

## 2020-03-19 RX ADMIN — PROPOFOL 20 MG: 10 INJECTION, EMULSION INTRAVENOUS at 13:12

## 2020-03-19 RX ADMIN — PROPRANOLOL HYDROCHLORIDE 20 MG: 20 TABLET ORAL at 09:33

## 2020-03-19 RX ADMIN — SODIUM CHLORIDE: 0.9 INJECTION, SOLUTION INTRAVENOUS at 13:03

## 2020-03-19 RX ADMIN — PROPOFOL 20 MG: 10 INJECTION, EMULSION INTRAVENOUS at 13:09

## 2020-03-19 RX ADMIN — PROPOFOL 50 MG: 10 INJECTION, EMULSION INTRAVENOUS at 13:05

## 2020-03-19 RX ADMIN — PROPOFOL 30 MG: 10 INJECTION, EMULSION INTRAVENOUS at 13:20

## 2020-03-19 RX ADMIN — SPIRONOLACTONE 50 MG: 25 TABLET ORAL at 09:33

## 2020-03-19 RX ADMIN — PROPOFOL 30 MG: 10 INJECTION, EMULSION INTRAVENOUS at 13:07

## 2020-03-19 RX ADMIN — PROPOFOL 50 MG: 10 INJECTION, EMULSION INTRAVENOUS at 13:03

## 2020-03-19 RX ADMIN — SODIUM CHLORIDE 8 MG/HR: 9 INJECTION, SOLUTION INTRAVENOUS at 06:33

## 2020-03-19 RX ADMIN — BUPRENORPHINE HYDROCHLORIDE, NALOXONE HYDROCHLORIDE 1 FILM: 8; 2 FILM, SOLUBLE BUCCAL; SUBLINGUAL at 09:33

## 2020-03-19 RX ADMIN — LACTULOSE 20 G: 10 SOLUTION ORAL at 09:33

## 2020-03-19 RX ADMIN — PROPOFOL 30 MG: 10 INJECTION, EMULSION INTRAVENOUS at 13:10

## 2020-03-19 NOTE — ASSESSMENT & PLAN NOTE
-due to acute illness as evidenced by BMI 19 12 and 14% wt loss over 2 5 months, treated with liberal diet when medically appropriate

## 2020-03-19 NOTE — INTERIM OP NOTE
Flexible sigmoidoscopy showed large hemorrhoids not actively bleeding  EGD showed grade 3 esophageal varices which were banded no active bleeding    Clear liquid diet 04-Feb-2017 02:13

## 2020-03-19 NOTE — CONSULTS
1  Esophageal dysphagia  Improved significantly with banding varices, now recurrent  Continue PPI  Propranolol was increased post procedure but he did not  the script, I recent prescription  Repeat EGD with banding  If symptoms fail to improve with banding will arrange a barium swallow      2  Secondary esophageal varices without bleeding (HCC)  Grade 3 varices February 2020  Treated with rubber band ligation  Increase propranolol to 20 mg daily  - propranolol (INDERAL) 20 mg tablet; Take 1 tablet (20 mg total) by mouth daily  Dispense: 30 tablet; Refill: 2     3  Hepatic encephalopathy (HCC)  Self-discontinued lactulose due to diarrhea  No encephalopathy on exam today, observe off medications     4  Ascites due to alcoholic cirrhosis (HCC)  Well managed with Lasix/spironolactone  Check BMP     5  Thrombocytopenia  Due to liver disease  Check coags and platelet count prior to endoscopy  Patient will obtain labs today      Followup Appointment:  EGD ASAP  ______________________________________________________________________         Chief Complaint   Patient presents with    Dysphagia      HPI:  The patient presents for dysphagia  He was last seen at the time of an upper endoscopy for dysphagia February 25th  This revealed large varices that were banded  No other etiology of dysphagia was identified  Had immediate relief of dysphagia but  Over the ensuing days had difficulty swallowing hamburger meat  He is frustrated the symptoms are back  He denies any nausea or vomiting  He denies any heartburn symptoms  He self-discontinued lactulose due to diarrhea  He denies any edema or recurrence of ascites    He has had no fevers, chills or cough      Historical Information         Medical History        Past Medical History:   Diagnosis Date    Cardiac disease      Chronic left-sided headaches      Chronic pain       back and neck    Chronic pain disorder      COPD (chronic obstructive pulmonary disease) [address] [address] )      History of transfusion      Hypertension           Surgical History         Past Surgical History:   Procedure Laterality Date    BACK SURGERY        CERVICAL FUSION        CHOLECYSTECTOMY        COLONOSCOPY   11/23/2019     Internal external hemorrhoids, nonbleeding rectal varices    CORONARY ANGIOPLASTY WITH STENT PLACEMENT         2006-PTCA/STENT to mid and distal RCA; then 2009 Left-LAD normal, circumflex-normal,Proximal % stnosis, Chronic total occlusion    EGD   01/11/2006    HERNIA REPAIR        JOINT REPLACEMENT Left       knee    KNEE SURGERY        NECK SURGERY        PARACENTESIS         [facility]    UPPER GASTROINTESTINAL ENDOSCOPY   01/11/2006     Gastritis and duodenitis    Pathology negative for any abnormality    UPPER GASTROINTESTINAL ENDOSCOPY   11/23/2019     Grade 3 esophageal varices and portal hypertensive gastropathy         Social History          Substance and Sexual Activity   Alcohol Use Not Currently      Social History          Substance and Sexual Activity   Drug Use No      Social History           Tobacco Use   Smoking Status Current Every Day Smoker    Types: Cigarettes   Smokeless Tobacco Never Used   Tobacco Comment     encouraged smoking cessation            Family History   Problem Relation Age of Onset    Heart disease Father      Heart disease Brother      Cardiomyopathy Brother      Colon polyps Neg Hx      Colon cancer Neg Hx             Current Medications      Current Outpatient Medications:     albuterol (PROVENTIL HFA,VENTOLIN HFA) 90 mcg/act inhaler    buprenorphine-naloxone (SUBOXONE) 8-2 mg per SL tablet    furosemide (LASIX) 20 mg tablet    methocarbamol (ROBAXIN) 750 mg tablet    omeprazole (PriLOSEC) 20 mg delayed release capsule    propranolol (INDERAL) 20 mg tablet    spironolactone (ALDACTONE) 50 mg tablet           Allergies   Allergen Reactions    Flexeril [Cyclobenzaprine] Hallucinations               Morphine And Related Hives    Naprosyn [Naproxen] GI Intolerance    Ultram [Tramadol] GI Intolerance      Reviewed medications and allergies and updated as indicated     PHYSICAL EXAM:    Blood pressure 134/82, pulse 76, height 6' (1 829 m), weight 64 kg (141 lb)  Body mass index is 19 12 kg/m²  General Appearance: NAD, cooperative, alert  Eyes: Anicteric, PERRLA, EOMI  ENT:  Normocephalic, atraumatic, normal mucosa     Neck:    Supple, symmetrical, trachea midline  Resp:  Clear to auscultation bilaterally; no rales, rhonchi or wheezing; respirations unlabored   CV:  S1 S2, Regular rate and rhythm; no murmur, rub, or gallop  GI:  Soft, non-tender, non-distended; normal bowel sounds; no masses, no organomegaly   Rectal: Deferred  Musculoskeletal: No cyanosis, clubbing or edema  Normal ROM  Skin:     No jaundice, rashes, or lesions   Heme/Lymph: No palpable cervical lymphadenopathy  Psych: Normal affect, good eye contact  Neuro: No gross deficits, AAOx3     Lab Results:         Lab Results   Component Value Date     WBC 3 3 (L) 02/19/2020     HGB 9 8 (L) 02/19/2020     HCT 29 4 (L) 02/19/2020     MCV 83 8 02/19/2020     PLT 87 (L) 02/19/2020            Lab Results   Component Value Date      08/27/2015     K 3 4 (L) 02/10/2020      02/10/2020     CO2 28 02/10/2020     ANIONGAP 12 08/27/2015     BUN 7 02/10/2020     CREATININE 0 65 (L) 02/10/2020     GLUCOSE 135 08/27/2015     GLUF 105 (H) 01/04/2020     CALCIUM 7 6 (L) 02/10/2020     AST 21 02/10/2020     ALT 13 02/10/2020     ALKPHOS 153 (H) 02/10/2020     PROT 6 9 08/27/2015     BILITOT 0 82 08/27/2015     EGFR 95 01/08/2020      No results found for: IRON, TIBC, FERRITIN        Lab Results   Component Value Date     LIPASE 133 01/03/2020         Radiology Results:   No results found

## 2020-03-19 NOTE — PLAN OF CARE
Problem: Potential for Falls  Goal: Patient will remain free of falls  Description  INTERVENTIONS:  - Assess patient frequently for physical needs  -  Identify cognitive and physical deficits and behaviors that affect risk of falls  -  [place] fall precautions as indicated by assessment   - Educate patient/family on patient safety including physical limitations  - Instruct patient to call for assistance with activity based on assessment  - Modify environment to reduce risk of injury  - Consider OT/PT consult to assist with strengthening/mobility  Outcome: Progressing     Problem: Nutrition/Hydration-ADULT  Goal: Nutrient/Hydration intake appropriate for improving, restoring or maintaining nutritional needs  Description  Monitor and assess patient's nutrition/hydration status for malnutrition  Collaborate with interdisciplinary team and initiate plan and interventions as ordered  Monitor patient's weight and dietary intake as ordered or per policy  Utilize nutrition screening tool and intervene as necessary  Determine patient's food preferences and provide high-protein, high-caloric foods as appropriate       INTERVENTIONS:  - Monitor oral intake, urinary output, labs, and treatment plans  - Assess nutrition and hydration status and recommend course of action  - Evaluate amount of meals eaten  - Assist patient with eating if necessary   - Allow adequate time for meals  - Recommend/ encourage appropriate diets, oral nutritional supplements, and vitamin/mineral supplements  - Order, calculate, and assess calorie counts as needed  - Recommend, monitor, and adjust tube feedings and TPN/PPN based on assessed needs  - Assess need for intravenous fluids  - Provide specific nutrition/hydration education as appropriate  - Include patient/family/caregiver in decisions related to nutrition  Outcome: Progressing

## 2020-03-19 NOTE — ASSESSMENT & PLAN NOTE
-Presented with 10+ episodes of BRBPR since yesterday  History esophageal varices which were banded on 02/25/2020 and rectal varices  History of lower GI bleed in January this year  Last seen by Dr Vianey Alonzo in office today for dysphagia secondary to varices    -Cont Protonix/Octreotide drips   -EGD: Normal duodenum  Moderate, generalized erythematous and granular mucosa in the fundus of the stomach  Multiple large and diffuse grade III varices with no bleeding (red ace sign); placed 3 bands successfully  Three varices were ligated in the distal esophagus   -Flex-Sig: Four large and internal (grade 1) hemorrhoids with no bleeding, located internally at one o'clock  No active bleeding noted at the time of sigmoidoscopy  All observed locations appeared normal, including the sigmoid colon and rectum  Normal mucosa from 0-30 cm    Views markedly obscured by poor prep no blood or active bleeding

## 2020-03-19 NOTE — DISCHARGE SUMMARY
Discharge- Ekaterina Perea 1960, 63 y o  male MRN: [id number]    Unit/Bed#: -01 Encounter: [id number]    Primary Care Provider: Nilsa Saucedo   Date and time admitted to hospital: 3/18/2020  3:07 PM        Underweight  Assessment & Plan  -due to acute illness as evidenced by BMI 19 12 and 14% wt loss over 2 5 months, treated with liberal diet when medically appropriate  Opioid dependence ([place] [address] )  Assessment & Plan  -continue Suboxone    Hyperammonemia (HCC)  Assessment & Plan  Ammonia level 65  Stated he stopped taking lactulose due to diarrhea  · Restarted lactulose 20 g daily  · Continue to monitor ammonia level    Esophageal varices determined by endoscopy [facility])  Assessment & Plan  -Patient had for 4 bands placed on 02/25/2020     -see above    COPD (chronic obstructive pulmonary disease) ([facility] )  Assessment & Plan  Without exacerbation  · Encourage smoking cessation  Patient currently refusing Nicoderm patch  · Albuterol inhaler 2 puffs p r n  Alcoholic cirrhosis (HCC)  Assessment & Plan  -Continue Lasix/spironolactone    Acute blood loss anemia  Assessment & Plan  -due to hemorrhoidal bleeding   -s/p 1U pRBCs, Hb 8 5    * GIB (gastrointestinal bleeding)  Assessment & Plan  -Presented with 10+ episodes of BRBPR since yesterday  History esophageal varices which were banded on 02/25/2020 and rectal varices  History of lower GI bleed in January this year  Last seen by Dr Damir Cardenas in office today for dysphagia secondary to varices    -Cont Protonix/Octreotide drips   -EGD: Normal duodenum  Moderate, generalized erythematous and granular mucosa in the fundus of the stomach  Multiple large and diffuse grade III varices with no bleeding (red ace sign); placed 3 bands successfully  Three varices were ligated in the distal esophagus   -Flex-Sig: Four large and internal (grade 1) hemorrhoids with no bleeding, located internally at one o'clock  No active bleeding noted at the time of sigmoidoscopy  All observed locations appeared normal, including the sigmoid colon and rectum  Normal mucosa from 0-30 cm  Views markedly obscured by poor prep no blood or active bleeding         Discharging Physician / Practitioner: Bart Melgoza MD  PCP: Nubia Gray  Admission Date:   Admission Orders (From admission, onward)     Ordered        03/18/20 1625  Inpatient Admission  Once                   Discharge Date: 03/19/20    Resolved Problems  Date Reviewed: 3/19/2020    None          Consultations During Hospital Stay:  · GI    Procedures Performed:   · Flex Sig and EGD    Significant Findings / Test Results:   · See above    Incidental Findings:   · None     Test Results Pending at Discharge (will require follow up): · None     Outpatient Tests Requested:  · CBC in 1 week, script from PCP    Complications:  None    Reason for Admission: BRBPR/acute blood loss anemia    Hospital Course:     Shun Sims is a 63 y o  male patient who originally presented to the hospital on 3/18/2020 due to BRBPR/acute blood loss anemia as outlined in the H&P done on admission  Please see above list of diagnoses and related plan for additional information  Condition at Discharge: fair     Discharge Day Visit / Exam:     * Please refer to separate progress note for these details *    Discharge instructions/Information to patient and family:   See after visit summary for information provided to patient and family  Provisions for Follow-Up Care:  See after visit summary for information related to follow-up care and any pertinent home health orders  Disposition:     Other: Leaving AMA    For Discharges to [facility]:   · Not Applicable to this Patient - Not Applicable to this Patient    Planned Readmission: N/A, pt leaving AMA     Discharge Statement:  I spent 35 minutes discharging the patient  This time was spent on the day of discharge  I had direct contact with the patient on the day of discharge   Greater than 50% of the total time was spent examining patient, answering all patient questions, arranging and discussing plan of care with patient as well as directly providing post-discharge instructions  Additional time then spent on discharge activities  Pt leaving AMA  Discharge Medications:  See after visit summary for reconciled discharge medications provided to patient and family        ** Please Note: This note has been constructed using a voice recognition system **

## 2020-03-19 NOTE — NURSING NOTE
Ordered to continue to monitor patients hemoglobin and to report to NP  We are going to transfuse if hemoglobin decreases under 7 g/dL  At 0216 Hemoglobin was 7 3 g/dL  Np was notified  Will continue to monitor pt   Nazario Guzman RN

## 2020-03-19 NOTE — ANESTHESIA PREPROCEDURE EVALUATION
Review of Systems/Medical History  Patient summary reviewed  Chart reviewed  No history of anesthetic complications     Cardiovascular  EKG reviewed, Exercise tolerance (METS): >4,  Hypertension , CAD ,    Pulmonary  Smoker cigarette smoker  , COPD mild- PRN medication , Shortness of breath,        GI/Hepatic  Dysphagia, Dysphagia type: solids  GI bleeding , active, Esophageal disease esophageal varices, Liver disease , alcohol related and cirrhosis, Chronic liver disease,        Negative  ROS        Endo/Other  Negative endo/other ROS      GYN  Negative gynecology ROS          Hematology  Anemia acute blood loss anemia,  Thrombocytopenia,    Musculoskeletal  Back pain ,   Comment: c spine fusion      Neurology  Negative neurology ROS      Psychology       Comment: Hx alcohol/drug abuse         Physical Exam    Airway    Mallampati score: II  TM Distance: >3 FB  Neck ROM: limited     Dental   Comment: Poor dentition,     Cardiovascular  Rhythm: regular, Rate: normal, Cardiovascular exam normal    Pulmonary  Pulmonary exam normal Decreased breath sounds,     Other Findings        Anesthesia Plan  ASA Score- 4     Anesthesia Type- IV sedation with anesthesia with ASA Monitors  Additional Monitors:   Airway Plan:     Comment: Plan TIVA, Gen Endo prn  Plan Factors-    Induction- intravenous  Postoperative Plan-     Informed Consent- Anesthetic plan and risks discussed with patient  I personally reviewed this patient with the CRNA  Discussed and agreed on the Anesthesia Plan with the CRNA  Shane Shi

## 2020-03-19 NOTE — ASSESSMENT & PLAN NOTE
-Presented with 10+ episodes of BRBPR since yesterday  History esophageal varices which were banded on 02/25/2020 and rectal varices  History of lower GI bleed in January this year  Last seen by Dr Neelam Mccracken in office today for dysphagia secondary to varices    -Cont Protonix/Octreotide drips   -EGD: Normal duodenum  Moderate, generalized erythematous and granular mucosa in the fundus of the stomach  Multiple large and diffuse grade III varices with no bleeding (red ace sign); placed 3 bands successfully  Three varices were ligated in the distal esophagus   -Flex-Sig: Four large and internal (grade 1) hemorrhoids with no bleeding, located internally at one o'clock  No active bleeding noted at the time of sigmoidoscopy  All observed locations appeared normal, including the sigmoid colon and rectum  Normal mucosa from 0-30 cm    Views markedly obscured by poor prep no blood or active bleeding

## 2020-03-19 NOTE — ASSESSMENT & PLAN NOTE
Ammonia level 65  Stated he stopped taking lactulose due to diarrhea  · Restarted lactulose 20 g daily      · Continue to monitor ammonia level

## 2020-03-19 NOTE — PLAN OF CARE
Problem: Potential for Falls  Goal: Patient will remain free of falls  Description  INTERVENTIONS:  - Assess patient frequently for physical needs  -  Identify cognitive and physical deficits and behaviors that affect risk of falls  -  [place] fall precautions as indicated by assessment   - Educate patient/family on patient safety including physical limitations  - Instruct patient to call for assistance with activity based on assessment  - Modify environment to reduce risk of injury  - Consider OT/PT consult to assist with strengthening/mobility  Outcome: Progressing     Problem: Nutrition/Hydration-ADULT  Goal: Nutrient/Hydration intake appropriate for improving, restoring or maintaining nutritional needs  Description  Monitor and assess patient's nutrition/hydration status for malnutrition  Collaborate with interdisciplinary team and initiate plan and interventions as ordered  Monitor patient's weight and dietary intake as ordered or per policy  Utilize nutrition screening tool and intervene as necessary  Determine patient's food preferences and provide high-protein, high-caloric foods as appropriate       INTERVENTIONS:  - Monitor oral intake, urinary output, labs, and treatment plans  - Assess nutrition and hydration status and recommend course of action  - Evaluate amount of meals eaten  - Assist patient with eating if necessary   - Allow adequate time for meals  - Recommend/ encourage appropriate diets, oral nutritional supplements, and vitamin/mineral supplements  - Order, calculate, and assess calorie counts as needed  - Recommend, monitor, and adjust tube feedings and TPN/PPN based on assessed needs  - Assess need for intravenous fluids  - Provide specific nutrition/hydration education as appropriate  - Include patient/family/caregiver in decisions related to nutrition  Outcome: Progressing

## 2020-03-19 NOTE — NURSING NOTE
Pt told this nurse that he was leaving today  Confirmed with Dr Silvestre Burt that pt was to be discharged 3/20  When nurse told pt, he became angry and stated, "I'm getting the hell out of here now " Pt signed out [place]

## 2020-03-19 NOTE — PROGRESS NOTES
Progress Note Mercy De Jesus 1960, 63 y o  male MRN: [id number]    Unit/Bed#: -01 Encounter: [id number]    Primary Care Provider: Jadiel Ferrer   Date and time admitted to hospital: 3/18/2020  3:07 PM        Underweight  Assessment & Plan  -due to acute illness as evidenced by BMI 19 12 and 14% wt loss over 2 5 months, treated with liberal diet when medically appropriate  Opioid dependence ([facility] )  Assessment & Plan  -continue Suboxone    Hyperammonemia (HCC)  Assessment & Plan  Ammonia level 65  Stated he stopped taking lactulose due to diarrhea  · Restarted lactulose 20 g daily  · Continue to monitor ammonia level    Esophageal varices determined by endoscopy [facility])  Assessment & Plan  -Patient had for 4 bands placed on 02/25/2020     -see above    COPD (chronic obstructive pulmonary disease) ([facility] )  Assessment & Plan  Without exacerbation  · Encourage smoking cessation  Patient currently refusing Nicoderm patch  · Albuterol inhaler 2 puffs p r n  Alcoholic cirrhosis (HCC)  Assessment & Plan  -Continue Lasix/spironolactone    Acute blood loss anemia  Assessment & Plan  -due to hemorrhoidal bleeding   -s/p 1U pRBCs, Hb 8 5    * GIB (gastrointestinal bleeding)  Assessment & Plan  -Presented with 10+ episodes of BRBPR since yesterday  History esophageal varices which were banded on 02/25/2020 and rectal varices  History of lower GI bleed in January this year  Last seen by Dr Sunny Harrison in office today for dysphagia secondary to varices    -Cont Protonix/Octreotide drips   -EGD: Normal duodenum  Moderate, generalized erythematous and granular mucosa in the fundus of the stomach  Multiple large and diffuse grade III varices with no bleeding (red ace sign); placed 3 bands successfully  Three varices were ligated in the distal esophagus   -Flex-Sig: Four large and internal (grade 1) hemorrhoids with no bleeding, located internally at one o'clock   No active bleeding noted at the time of sigmoidoscopy  All observed locations appeared normal, including the sigmoid colon and rectum  Normal mucosa from 0-30 cm  Views markedly obscured by poor prep no blood or active bleeding         VTE Pharmacologic Prophylaxis:   Pharmacologic: Pharmacologic VTE Prophylaxis contraindicated due to GIB  Mechanical VTE Prophylaxis in Place: No    Patient Centered Rounds: I have performed bedside rounds with nursing staff today  Discussions with Specialists or Other Care Team Provider: GI    Education and Discussions with Family / Patient: Pt    Time Spent for Care: 20 minutes  More than 50% of total time spent on counseling and coordination of care as described above  Current Length of Stay: 1 day(s)    Current Patient Status: Inpatient   Certification Statement: The patient will continue to require additional inpatient hospital stay due to GIB    Discharge Plan: today or tomorrow    Code Status: Level 1 - Full Code      Subjective:   Pt without acute complaints  Denies CP/SOB/abd pain  Objective:     Vitals:   Temp (24hrs), Av 9 °F (36 6 °C), Min:97 6 °F (36 4 °C), Max:98 1 °F (36 7 °C)    Temp:  [97 6 °F (36 4 °C)-98 1 °F (36 7 °C)] 98 °F (36 7 °C)  HR:  [54-74] 54  Resp:  [16-20] 20  BP: ()/(50-85) 122/85  SpO2:  [92 %-100 %] 100 %  Body mass index is 19 12 kg/m²  Input and Output Summary (last 24 hours):        Intake/Output Summary (Last 24 hours) at 3/19/2020 1458  Last data filed at 3/19/2020 1320  Gross per 24 hour   Intake 1025 ml   Output --   Net 1025 ml       Physical Exam:     Physical Exam  Gen: NAD, AAOx3, appears chronically ill  Eyes: EOMI, PERRLA, no scleral icterus  ENMT: no nasal discharge, no otic discharge, moist mucous membranes  Neck:  Supple  Cardiovascular:  Regular rate and rhythm, normal S1-S2, no murmurs, rubs, or gallops  Lungs:  Clear to auscultation bilaterally, no wheezes, or rales, or rhonchi  Abdomen:  Positive bowel sounds, soft, nontender, nondistended, no palpable organomegaly   Skin:  Intact, no obvious lesions or rashes  Neuro: Cranial nerves 2-12 are intact, non-focal, 5/5 strength in all 4 extremities      Additional Data:     Labs:    Results from last 7 days   Lab Units 03/19/20  1430 03/19/20  0453  03/18/20  1545   WBC Thousand/uL  --  2 04*  --  5 49   HEMOGLOBIN g/dL 8 5* 7 6*   < > 7 8*   HEMATOCRIT %  --  23 0*  --  24 2*   PLATELETS Thousands/uL  --  71*  --  112*   NEUTROS PCT %  --   --   --  69   LYMPHS PCT %  --   --   --  20   MONOS PCT %  --   --   --  7   EOS PCT %  --   --   --  2    < > = values in this interval not displayed  Results from last 7 days   Lab Units 03/19/20  0453   SODIUM mmol/L 136   POTASSIUM mmol/L 4 0   CHLORIDE mmol/L 106   CO2 mmol/L 23   BUN mg/dL 13   CREATININE mg/dL 0 88   ANION GAP mmol/L 7   CALCIUM mg/dL 7 8*   ALBUMIN g/dL 2 6*   TOTAL BILIRUBIN mg/dL 0 90   ALK PHOS U/L 161*   ALT U/L 11*   AST U/L 16   GLUCOSE RANDOM mg/dL 100     Results from last 7 days   Lab Units 03/18/20  1545   INR  1 32*                       * I Have Reviewed All Lab Data Listed Above  * Additional Pertinent Lab Tests Reviewed:  All [facility] Admission Reviewed    Recent Cultures (last 7 days):           Last 24 Hours Medication List:     Current Facility-Administered Medications:  albuterol 2 puff Inhalation PRN ROXANNE Martinez    buprenorphine-naloxone 1 Film Sublingual Daily MariamaJUS HutchisonNP    furosemide 40 mg Oral Daily Mariama Xanderridebbie, CRNP    lactulose 20 g Oral Daily Mariama Macridebbie, JUSNP    methocarbamol 750 mg Oral BID ROXANNE Martinez    octreotide 25 mcg/hr Intravenous Continuous MariamaJUS HutchisonNP Last Rate: 25 mcg/hr (03/18/20 2010)   ondansetron 4 mg Intravenous Q6H PRN MariamaJUS HutchisonNP    pantoprozole (PROTONIX) infusion (Continuous) 8 mg/hr Intravenous Continuous Mariamaanyi Novak, CRNP Last Rate: 8 mg/hr (03/19/20 4310)   propranolol 20 mg Oral Daily Consuella Ruth, CRNP spironolactone 50 mg Oral Daily ROXANNE Boland         Today, Patient Was Seen By: Justino Rodriguez MD    ** Please Note: Dictation voice to text software may have been used in the creation of this document   **

## 2020-03-19 NOTE — SOCIAL WORK
LOS: 1 day  GMLOS: 2 days  PATIENT IS NOT A MEDICARE BUNDLE PATIENT OR A 30 DAY READMISSION  Met with patient  Explained role of care management  Patient lives with spouse, 3 daughters and 2 grandchildren in a one story home with ramp to enter  He is independent adl's and ambulation - currently using crutches to ambulate, drives, is on SS disability  DME - crutches, nebulizer  Past services - denies VNA, SNF  History of D&A - states he signed himself into [employer] in 2019 for addiction to percocet  He denies alcohol  Pharmacy of choice is [facility] in [place] - he has a prescription plan and is able to afford his medication  He does not have a POA/AD and is not interested in information  States his family would help, if needed at home  He prefers to return home at discharge and denies need for services  Will follow  CM reviewed d/c planning process including the following: identifying help at home, patient preference for d/c planning needs, availability of treatment team to discuss questions or concerns patient and/or family may have regarding understanding medications and recognizing signs and symptoms once discharged  CM also encouraged patient to follow up with all recommended appointments after discharge  Patient advised of importance for patient and family to participate in managing patients medical well being

## 2020-03-19 NOTE — PLAN OF CARE
Problem: Potential for Falls  Goal: Patient will remain free of falls  Description  INTERVENTIONS:  - Assess patient frequently for physical needs  -  Identify cognitive and physical deficits and behaviors that affect risk of falls  -  [place] fall precautions as indicated by assessment   - Educate patient/family on patient safety including physical limitations  - Instruct patient to call for assistance with activity based on assessment  - Modify environment to reduce risk of injury  - Consider OT/PT consult to assist with strengthening/mobility  Outcome: Progressing     Problem: Nutrition/Hydration-ADULT  Goal: Nutrient/Hydration intake appropriate for improving, restoring or maintaining nutritional needs  Description  Monitor and assess patient's nutrition/hydration status for malnutrition  Collaborate with interdisciplinary team and initiate plan and interventions as ordered  Monitor patient's weight and dietary intake as ordered or per policy  Utilize nutrition screening tool and intervene as necessary  Determine patient's food preferences and provide high-protein, high-caloric foods as appropriate       INTERVENTIONS:  - Monitor oral intake, urinary output, labs, and treatment plans  - Assess nutrition and hydration status and recommend course of action  - Evaluate amount of meals eaten  - Assist patient with eating if necessary   - Allow adequate time for meals  - Recommend/ encourage appropriate diets, oral nutritional supplements, and vitamin/mineral supplements  - Order, calculate, and assess calorie counts as needed  - Recommend, monitor, and adjust tube feedings and TPN/PPN based on assessed needs  - Assess need for intravenous fluids  - Provide specific nutrition/hydration education as appropriate  - Include patient/family/caregiver in decisions related to nutrition  Outcome: Progressing

## 2020-03-20 ENCOUNTER — TELEPHONE (OUTPATIENT)
Dept: GASTROENTEROLOGY | Facility: CLINIC | Age: 60
End: 2020-03-20

## 2020-03-20 NOTE — TELEPHONE ENCOUNTER
Pt's daughter Xander Boles states her dad was in the hosp yesterday and had bands; asks how long until he can eat?    I believe she also was saying he feels like things get stuck but call cut out  Call was from [phone]    Tried calling back  (Identified as Tricia)  Left [facility] requesting she call

## 2020-03-20 NOTE — TELEPHONE ENCOUNTER
Okay to eat soft foods like pasta, ground beef etc now, gradually advancing to all foods as tolerated

## 2020-03-23 ENCOUNTER — PATIENT OUTREACH (OUTPATIENT)
Dept: CASE MANAGEMENT | Facility: HOSPITAL | Age: 60
End: 2020-03-23

## 2020-03-23 NOTE — PROGRESS NOTES
Spoke with patient he will participate in [address] for spinal injury  Wife and kids help take care of him when he needs it  He did make PCP follow up appointment but cannot remember the date I will check back in one week  Just started eating a soft diet

## 2020-03-31 ENCOUNTER — PATIENT OUTREACH (OUTPATIENT)
Dept: CASE MANAGEMENT | Facility: HOSPITAL | Age: 60
End: 2020-03-31

## 2020-03-31 NOTE — PROGRESS NOTES
Spoke with patient  He is doing okay  Having some trouble swallowing some foods  He had appointment tomorrow with GI he will be going there in person  He does not have any bleeding but just can feel some foods getting stuck when he swallows  He took himself off his saboxone two days ago he will talk with his pain management doctor to let them know  He is using his cane to walk  Really wants to get back to work  Feels down and depressed about not being able to work  I encouraged him to talk with his PCP on Monday at his appointment about this  He states he stopped drinking 10 years ago  He is afraid they will need to scope him again  Concerned about exposure to COVID if he has to go back to the hospital  I will check back in one week

## 2020-04-01 ENCOUNTER — OFFICE VISIT (OUTPATIENT)
Dept: GASTROENTEROLOGY | Facility: CLINIC | Age: 60
End: 2020-04-01
Payer: MEDICARE

## 2020-04-01 VITALS
WEIGHT: 145 LBS | DIASTOLIC BLOOD PRESSURE: 66 MMHG | SYSTOLIC BLOOD PRESSURE: 114 MMHG | BODY MASS INDEX: 19.64 KG/M2 | HEART RATE: 65 BPM | HEIGHT: 72 IN

## 2020-04-01 DIAGNOSIS — K70.30 ALCOHOLIC CIRRHOSIS, UNSPECIFIED WHETHER ASCITES PRESENT (HCC): ICD-10-CM

## 2020-04-01 DIAGNOSIS — Z12.11 COLON CANCER SCREENING: ICD-10-CM

## 2020-04-01 DIAGNOSIS — R13.19 ESOPHAGEAL DYSPHAGIA: Primary | ICD-10-CM

## 2020-04-01 DIAGNOSIS — I85.00 ESOPHAGEAL VARICES WITHOUT BLEEDING, UNSPECIFIED ESOPHAGEAL VARICES TYPE (HCC): ICD-10-CM

## 2020-04-01 PROCEDURE — 99214 OFFICE O/P EST MOD 30 MIN: CPT | Performed by: INTERNAL MEDICINE

## 2020-04-02 ENCOUNTER — TELEPHONE (OUTPATIENT)
Dept: GASTROENTEROLOGY | Facility: CLINIC | Age: 60
End: 2020-04-02

## 2020-04-02 DIAGNOSIS — K72.90 HEPATIC ENCEPHALOPATHY (HCC): Primary | ICD-10-CM

## 2020-04-02 LAB
ALBUMIN SERPL-MCNC: 3.2 G/DL (ref 3.6–5.1)
ALBUMIN/GLOB SERPL: 1.1 (CALC) (ref 1–2.5)
ALP SERPL-CCNC: 176 U/L (ref 35–144)
ALT SERPL-CCNC: 6 U/L (ref 9–46)
AST SERPL-CCNC: 12 U/L (ref 10–35)
BASOPHILS # BLD AUTO: 11 CELLS/UL (ref 0–200)
BASOPHILS NFR BLD AUTO: 0.4 %
BILIRUB SERPL-MCNC: 0.5 MG/DL (ref 0.2–1.2)
BUN SERPL-MCNC: 6 MG/DL (ref 7–25)
BUN/CREAT SERPL: 8 (CALC) (ref 6–22)
CALCIUM SERPL-MCNC: 7.8 MG/DL (ref 8.6–10.3)
CHLORIDE SERPL-SCNC: 104 MMOL/L (ref 98–110)
CO2 SERPL-SCNC: 27 MMOL/L (ref 20–32)
CREAT SERPL-MCNC: 0.73 MG/DL (ref 0.7–1.33)
EOSINOPHIL # BLD AUTO: 59 CELLS/UL (ref 15–500)
EOSINOPHIL NFR BLD AUTO: 2.2 %
ERYTHROCYTE [DISTWIDTH] IN BLOOD BY AUTOMATED COUNT: 16.3 % (ref 11–15)
GLOBULIN SER CALC-MCNC: 3 G/DL (CALC) (ref 1.9–3.7)
GLUCOSE SERPL-MCNC: 130 MG/DL (ref 65–139)
HCT VFR BLD AUTO: 23.9 % (ref 38.5–50)
HGB BLD-MCNC: 7.7 G/DL (ref 13.2–17.1)
LYMPHOCYTES # BLD AUTO: 410 CELLS/UL (ref 850–3900)
LYMPHOCYTES NFR BLD AUTO: 15.2 %
MCH RBC QN AUTO: 25.9 PG (ref 27–33)
MCHC RBC AUTO-ENTMCNC: 32.2 G/DL (ref 32–36)
MCV RBC AUTO: 80.5 FL (ref 80–100)
MONOCYTES # BLD AUTO: 140 CELLS/UL (ref 200–950)
MONOCYTES NFR BLD AUTO: 5.2 %
NEUTROPHILS # BLD AUTO: 2079 CELLS/UL (ref 1500–7800)
NEUTROPHILS NFR BLD AUTO: 77 %
PLATELET # BLD AUTO: 72 THOUSAND/UL (ref 140–400)
PMV BLD REES-ECKER: 11 FL (ref 7.5–12.5)
POTASSIUM SERPL-SCNC: 3.4 MMOL/L (ref 3.5–5.3)
PROT SERPL-MCNC: 6.2 G/DL (ref 6.1–8.1)
RBC # BLD AUTO: 2.97 MILLION/UL (ref 4.2–5.8)
SERVICE CMNT-IMP: ABNORMAL
SL AMB EGFR AFRICAN AMERICAN: 119 ML/MIN/1.73M2
SL AMB EGFR NON AFRICAN AMERICAN: 102 ML/MIN/1.73M2
SODIUM SERPL-SCNC: 138 MMOL/L (ref 135–146)
WBC # BLD AUTO: 2.7 THOUSAND/UL (ref 3.8–10.8)

## 2020-04-03 ENCOUNTER — HOSPITAL ENCOUNTER (EMERGENCY)
Facility: HOSPITAL | Age: 60
Discharge: HOME | End: 2020-04-03
Attending: EMERGENCY MEDICINE | Admitting: EMERGENCY MEDICINE
Payer: MEDICARE

## 2020-04-03 ENCOUNTER — TELEPHONE (OUTPATIENT)
Dept: GASTROENTEROLOGY | Facility: CLINIC | Age: 60
End: 2020-04-03

## 2020-04-03 VITALS
TEMPERATURE: 98.6 F | DIASTOLIC BLOOD PRESSURE: 69 MMHG | HEIGHT: 72 IN | OXYGEN SATURATION: 100 % | BODY MASS INDEX: 19.64 KG/M2 | SYSTOLIC BLOOD PRESSURE: 144 MMHG | HEART RATE: 74 BPM | WEIGHT: 145 LBS | RESPIRATION RATE: 22 BRPM

## 2020-04-03 DIAGNOSIS — F41.9 ANXIETY: ICD-10-CM

## 2020-04-03 DIAGNOSIS — J44.9 COPD (CHRONIC OBSTRUCTIVE PULMONARY DISEASE) (HCC): ICD-10-CM

## 2020-04-03 DIAGNOSIS — E87.6 HYPOKALEMIA: ICD-10-CM

## 2020-04-03 DIAGNOSIS — R53.83 FATIGUE: Primary | ICD-10-CM

## 2020-04-03 DIAGNOSIS — D63.8 ANEMIA OF CHRONIC DISEASE: ICD-10-CM

## 2020-04-03 DIAGNOSIS — K70.31 ALCOHOLIC CIRRHOSIS OF LIVER WITH ASCITES (HCC): ICD-10-CM

## 2020-04-03 LAB
ALBUMIN SERPL BCP-MCNC: 2.9 G/DL (ref 3.5–5)
ALP SERPL-CCNC: 193 U/L (ref 46–116)
ALT SERPL W P-5'-P-CCNC: 12 U/L (ref 12–78)
AMMONIA PLAS-SCNC: <10 UMOL/L (ref 11–35)
ANION GAP SERPL CALCULATED.3IONS-SCNC: 10 MMOL/L (ref 4–13)
APTT PPP: 31 SECONDS (ref 23–37)
AST SERPL W P-5'-P-CCNC: 15 U/L (ref 5–45)
BASOPHILS # BLD AUTO: 0.01 THOUSANDS/ΜL (ref 0–0.1)
BASOPHILS NFR BLD AUTO: 0 % (ref 0–1)
BILIRUB SERPL-MCNC: 0.3 MG/DL (ref 0.2–1)
BILIRUB UR QL STRIP: NEGATIVE
BUN SERPL-MCNC: 8 MG/DL (ref 5–25)
CALCIUM SERPL-MCNC: 7.9 MG/DL (ref 8.3–10.1)
CHLORIDE SERPL-SCNC: 105 MMOL/L (ref 100–108)
CLARITY UR: CLEAR
CO2 SERPL-SCNC: 26 MMOL/L (ref 21–32)
COLOR UR: YELLOW
CREAT SERPL-MCNC: 0.72 MG/DL (ref 0.6–1.3)
EOSINOPHIL # BLD AUTO: 0.07 THOUSAND/ΜL (ref 0–0.61)
EOSINOPHIL NFR BLD AUTO: 3 % (ref 0–6)
ERYTHROCYTE [DISTWIDTH] IN BLOOD BY AUTOMATED COUNT: 16.7 % (ref 11.6–15.1)
GFR SERPL CREATININE-BSD FRML MDRD: 103 ML/MIN/1.73SQ M
GLUCOSE SERPL-MCNC: 88 MG/DL (ref 65–140)
GLUCOSE UR STRIP-MCNC: NEGATIVE MG/DL
HCT VFR BLD AUTO: 23.9 % (ref 36.5–49.3)
HGB BLD-MCNC: 7.5 G/DL (ref 12–17)
HGB UR QL STRIP.AUTO: NEGATIVE
IMM GRANULOCYTES # BLD AUTO: 0.01 THOUSAND/UL (ref 0–0.2)
IMM GRANULOCYTES NFR BLD AUTO: 0 % (ref 0–2)
INR PPP: 1.26 (ref 0.84–1.19)
KETONES UR STRIP-MCNC: NEGATIVE MG/DL
LEUKOCYTE ESTERASE UR QL STRIP: NEGATIVE
LYMPHOCYTES # BLD AUTO: 0.55 THOUSANDS/ΜL (ref 0.6–4.47)
LYMPHOCYTES NFR BLD AUTO: 20 % (ref 14–44)
MCH RBC QN AUTO: 25.9 PG (ref 26.8–34.3)
MCHC RBC AUTO-ENTMCNC: 31.4 G/DL (ref 31.4–37.4)
MCV RBC AUTO: 82 FL (ref 82–98)
MONOCYTES # BLD AUTO: 0.21 THOUSAND/ΜL (ref 0.17–1.22)
MONOCYTES NFR BLD AUTO: 8 % (ref 4–12)
NEUTROPHILS # BLD AUTO: 1.95 THOUSANDS/ΜL (ref 1.85–7.62)
NEUTS SEG NFR BLD AUTO: 69 % (ref 43–75)
NITRITE UR QL STRIP: NEGATIVE
NRBC BLD AUTO-RTO: 0 /100 WBCS
PH UR STRIP.AUTO: 7.5 [PH]
PLATELET # BLD AUTO: 68 THOUSANDS/UL (ref 149–390)
PMV BLD AUTO: 10.5 FL (ref 8.9–12.7)
POTASSIUM SERPL-SCNC: 3.2 MMOL/L (ref 3.5–5.3)
PROT SERPL-MCNC: 6.9 G/DL (ref 6.4–8.2)
PROT UR STRIP-MCNC: NEGATIVE MG/DL
PROTHROMBIN TIME: 15.5 SECONDS (ref 11.6–14.5)
RBC # BLD AUTO: 2.9 MILLION/UL (ref 3.88–5.62)
SODIUM SERPL-SCNC: 141 MMOL/L (ref 136–145)
SP GR UR STRIP.AUTO: 1.01 (ref 1–1.03)
TROPONIN I SERPL-MCNC: <0.02 NG/ML
UROBILINOGEN UR QL STRIP.AUTO: 0.2 E.U./DL
WBC # BLD AUTO: 2.8 THOUSAND/UL (ref 4.31–10.16)

## 2020-04-03 PROCEDURE — 99285 EMERGENCY DEPT VISIT HI MDM: CPT

## 2020-04-03 PROCEDURE — 36415 COLL VENOUS BLD VENIPUNCTURE: CPT | Performed by: EMERGENCY MEDICINE

## 2020-04-03 PROCEDURE — 84484 ASSAY OF TROPONIN QUANT: CPT | Performed by: EMERGENCY MEDICINE

## 2020-04-03 PROCEDURE — 81003 URINALYSIS AUTO W/O SCOPE: CPT | Performed by: EMERGENCY MEDICINE

## 2020-04-03 PROCEDURE — 85610 PROTHROMBIN TIME: CPT | Performed by: EMERGENCY MEDICINE

## 2020-04-03 PROCEDURE — 80053 COMPREHEN METABOLIC PANEL: CPT | Performed by: EMERGENCY MEDICINE

## 2020-04-03 PROCEDURE — 85730 THROMBOPLASTIN TIME PARTIAL: CPT | Performed by: EMERGENCY MEDICINE

## 2020-04-03 PROCEDURE — 85025 COMPLETE CBC W/AUTO DIFF WBC: CPT | Performed by: EMERGENCY MEDICINE

## 2020-04-03 PROCEDURE — 93005 ELECTROCARDIOGRAM TRACING: CPT

## 2020-04-03 PROCEDURE — 82140 ASSAY OF AMMONIA: CPT | Performed by: EMERGENCY MEDICINE

## 2020-04-03 PROCEDURE — 99284 EMERGENCY DEPT VISIT MOD MDM: CPT | Performed by: EMERGENCY MEDICINE

## 2020-04-03 RX ORDER — ALBUTEROL SULFATE 2.5 MG/3ML
2.5 SOLUTION RESPIRATORY (INHALATION) EVERY 6 HOURS PRN
COMMUNITY

## 2020-04-03 RX ORDER — LORAZEPAM 1 MG/1
1 TABLET ORAL ONCE
Status: COMPLETED | OUTPATIENT
Start: 2020-04-03 | End: 2020-04-03

## 2020-04-03 RX ORDER — POTASSIUM CHLORIDE 20 MEQ/1
20 TABLET, EXTENDED RELEASE ORAL ONCE
Status: COMPLETED | OUTPATIENT
Start: 2020-04-03 | End: 2020-04-03

## 2020-04-03 RX ADMIN — LORAZEPAM 1 MG: 1 TABLET ORAL at 18:05

## 2020-04-03 RX ADMIN — POTASSIUM CHLORIDE 20 MEQ: 20 TABLET, EXTENDED RELEASE ORAL at 18:41

## 2020-04-07 ENCOUNTER — ANESTHESIA EVENT (OUTPATIENT)
Dept: GASTROENTEROLOGY | Facility: HOSPITAL | Age: 60
End: 2020-04-07

## 2020-04-07 ENCOUNTER — ANESTHESIA (OUTPATIENT)
Dept: GASTROENTEROLOGY | Facility: HOSPITAL | Age: 60
End: 2020-04-07

## 2020-04-07 ENCOUNTER — HOSPITAL ENCOUNTER (OUTPATIENT)
Dept: GASTROENTEROLOGY | Facility: HOSPITAL | Age: 60
Setting detail: OUTPATIENT SURGERY
Discharge: HOME | End: 2020-04-07
Attending: INTERNAL MEDICINE | Admitting: INTERNAL MEDICINE
Payer: MEDICARE

## 2020-04-07 VITALS
HEART RATE: 71 BPM | DIASTOLIC BLOOD PRESSURE: 66 MMHG | WEIGHT: 145 LBS | TEMPERATURE: 97.9 F | RESPIRATION RATE: 24 BRPM | SYSTOLIC BLOOD PRESSURE: 127 MMHG | HEIGHT: 72 IN | OXYGEN SATURATION: 100 % | BODY MASS INDEX: 19.64 KG/M2

## 2020-04-07 DIAGNOSIS — R13.19 ESOPHAGEAL DYSPHAGIA: ICD-10-CM

## 2020-04-07 LAB
ATRIAL RATE: 77 BPM
P AXIS: 73 DEGREES
PR INTERVAL: 170 MS
QRS AXIS: 85 DEGREES
QRSD INTERVAL: 86 MS
QT INTERVAL: 436 MS
QTC INTERVAL: 493 MS
T WAVE AXIS: 14 DEGREES
VENTRICULAR RATE: 77 BPM

## 2020-04-07 PROCEDURE — 43244 EGD VARICES LIGATION: CPT | Performed by: INTERNAL MEDICINE

## 2020-04-07 PROCEDURE — 93010 ELECTROCARDIOGRAM REPORT: CPT | Performed by: INTERNAL MEDICINE

## 2020-04-07 RX ORDER — SODIUM CHLORIDE 9 MG/ML
50 INJECTION, SOLUTION INTRAVENOUS CONTINUOUS
Status: DISCONTINUED | OUTPATIENT
Start: 2020-04-07 | End: 2020-04-11 | Stop reason: HOSPADM

## 2020-04-07 RX ORDER — PROPOFOL 10 MG/ML
INJECTION, EMULSION INTRAVENOUS AS NEEDED
Status: DISCONTINUED | OUTPATIENT
Start: 2020-04-07 | End: 2020-04-07 | Stop reason: SURG

## 2020-04-07 RX ORDER — MIDAZOLAM HYDROCHLORIDE 2 MG/2ML
INJECTION, SOLUTION INTRAMUSCULAR; INTRAVENOUS AS NEEDED
Status: DISCONTINUED | OUTPATIENT
Start: 2020-04-07 | End: 2020-04-07 | Stop reason: SURG

## 2020-04-07 RX ADMIN — PROPOFOL 20 MG: 10 INJECTION, EMULSION INTRAVENOUS at 10:20

## 2020-04-07 RX ADMIN — PROPOFOL 50 MG: 10 INJECTION, EMULSION INTRAVENOUS at 10:24

## 2020-04-07 RX ADMIN — PROPOFOL 100 MG: 10 INJECTION, EMULSION INTRAVENOUS at 10:15

## 2020-04-07 RX ADMIN — SODIUM CHLORIDE 50 ML/HR: 0.9 INJECTION, SOLUTION INTRAVENOUS at 10:01

## 2020-04-07 RX ADMIN — SODIUM CHLORIDE: 0.9 INJECTION, SOLUTION INTRAVENOUS at 10:10

## 2020-04-07 RX ADMIN — PROPOFOL 30 MG: 10 INJECTION, EMULSION INTRAVENOUS at 10:21

## 2020-04-07 RX ADMIN — PROPOFOL 50 MG: 10 INJECTION, EMULSION INTRAVENOUS at 10:18

## 2020-04-07 RX ADMIN — MIDAZOLAM 2 MG: 1 INJECTION INTRAMUSCULAR; INTRAVENOUS at 10:07

## 2020-04-08 ENCOUNTER — TELEPHONE (OUTPATIENT)
Dept: GASTROENTEROLOGY | Facility: CLINIC | Age: 60
End: 2020-04-08

## 2020-04-08 DIAGNOSIS — K70.31 ASCITES DUE TO ALCOHOLIC CIRRHOSIS (HCC): Primary | ICD-10-CM

## 2020-04-09 ENCOUNTER — TELEPHONE (OUTPATIENT)
Dept: GASTROENTEROLOGY | Facility: CLINIC | Age: 60
End: 2020-04-09

## 2020-04-09 LAB
BASOPHILS # BLD AUTO: 20 CELLS/UL (ref 0–200)
BASOPHILS NFR BLD AUTO: 0.7 %
BUN SERPL-MCNC: 10 MG/DL (ref 7–25)
BUN/CREAT SERPL: ABNORMAL (CALC) (ref 6–22)
CALCIUM SERPL-MCNC: 8.4 MG/DL (ref 8.6–10.3)
CHLORIDE SERPL-SCNC: 107 MMOL/L (ref 98–110)
CO2 SERPL-SCNC: 27 MMOL/L (ref 20–32)
CREAT SERPL-MCNC: 0.76 MG/DL (ref 0.7–1.33)
EOSINOPHIL # BLD AUTO: 61 CELLS/UL (ref 15–500)
EOSINOPHIL NFR BLD AUTO: 2.1 %
ERYTHROCYTE [DISTWIDTH] IN BLOOD BY AUTOMATED COUNT: 16.5 % (ref 11–15)
GLUCOSE SERPL-MCNC: 122 MG/DL (ref 65–139)
HCT VFR BLD AUTO: 26.6 % (ref 38.5–50)
HGB BLD-MCNC: 8.3 G/DL (ref 13.2–17.1)
LYMPHOCYTES # BLD AUTO: 534 CELLS/UL (ref 850–3900)
LYMPHOCYTES NFR BLD AUTO: 18.4 %
MCH RBC QN AUTO: 25.2 PG (ref 27–33)
MCHC RBC AUTO-ENTMCNC: 31.2 G/DL (ref 32–36)
MCV RBC AUTO: 80.6 FL (ref 80–100)
MONOCYTES # BLD AUTO: 183 CELLS/UL (ref 200–950)
MONOCYTES NFR BLD AUTO: 6.3 %
NEUTROPHILS # BLD AUTO: 2103 CELLS/UL (ref 1500–7800)
NEUTROPHILS NFR BLD AUTO: 72.5 %
PLATELET # BLD AUTO: 86 THOUSAND/UL (ref 140–400)
PMV BLD REES-ECKER: 11.1 FL (ref 7.5–12.5)
POTASSIUM SERPL-SCNC: 4.1 MMOL/L (ref 3.5–5.3)
RBC # BLD AUTO: 3.3 MILLION/UL (ref 4.2–5.8)
SL AMB EGFR AFRICAN AMERICAN: 117 ML/MIN/1.73M2
SL AMB EGFR NON AFRICAN AMERICAN: 101 ML/MIN/1.73M2
SODIUM SERPL-SCNC: 140 MMOL/L (ref 135–146)
WBC # BLD AUTO: 2.9 THOUSAND/UL (ref 3.8–10.8)

## 2020-04-10 ENCOUNTER — PATIENT OUTREACH (OUTPATIENT)
Dept: CASE MANAGEMENT | Facility: HOSPITAL | Age: 60
End: 2020-04-10

## 2020-04-16 ENCOUNTER — TELEPHONE (OUTPATIENT)
Dept: GASTROENTEROLOGY | Facility: CLINIC | Age: 60
End: 2020-04-16

## 2020-04-16 DIAGNOSIS — R13.10 DYSPHAGIA, UNSPECIFIED TYPE: Primary | ICD-10-CM

## 2020-04-17 ENCOUNTER — PREP FOR PROCEDURE (OUTPATIENT)
Dept: GASTROENTEROLOGY | Facility: CLINIC | Age: 60
End: 2020-04-17

## 2020-04-17 DIAGNOSIS — R13.10 DYSPHAGIA, UNSPECIFIED TYPE: Primary | ICD-10-CM

## 2020-04-17 DIAGNOSIS — I85.00 ESOPHAGEAL VARICES DETERMINED BY ENDOSCOPY (HCC): ICD-10-CM

## 2020-04-17 DIAGNOSIS — Z20.822 ENCOUNTER FOR LABORATORY TESTING FOR COVID-19 VIRUS: ICD-10-CM

## 2020-04-22 ENCOUNTER — TELEPHONE (OUTPATIENT)
Dept: MRI IMAGING | Facility: HOSPITAL | Age: 60
End: 2020-04-22

## 2020-04-24 ENCOUNTER — PATIENT OUTREACH (OUTPATIENT)
Dept: CASE MANAGEMENT | Facility: HOSPITAL | Age: 60
End: 2020-04-24

## 2020-04-27 ENCOUNTER — TELEPHONE (OUTPATIENT)
Dept: GASTROENTEROLOGY | Facility: CLINIC | Age: 60
End: 2020-04-27

## 2020-04-27 ENCOUNTER — HOSPITAL ENCOUNTER (OUTPATIENT)
Dept: RADIOLOGY | Facility: HOSPITAL | Age: 60
Discharge: HOME | End: 2020-04-27
Payer: MEDICARE

## 2020-04-27 DIAGNOSIS — R13.10 DYSPHAGIA, UNSPECIFIED TYPE: Primary | ICD-10-CM

## 2020-04-27 DIAGNOSIS — R13.10 DYSPHAGIA, UNSPECIFIED TYPE: ICD-10-CM

## 2020-04-27 PROBLEM — K21.9 GASTROESOPHAGEAL REFLUX DISEASE: Status: ACTIVE | Noted: 2020-04-27

## 2020-04-27 PROBLEM — Z91.89 AT HIGH RISK FOR ASPIRATION: Status: ACTIVE | Noted: 2020-04-27

## 2020-04-27 PROBLEM — K11.7 XEROSTOMIA: Status: ACTIVE | Noted: 2020-04-27

## 2020-04-27 PROBLEM — F17.200 TOBACCO DEPENDENCE: Status: ACTIVE | Noted: 2020-04-27

## 2020-04-27 PROBLEM — R49.0 DYSPHONIA: Status: ACTIVE | Noted: 2020-04-27

## 2020-04-27 PROCEDURE — 74220 X-RAY XM ESOPHAGUS 1CNTRST: CPT

## 2020-05-01 ENCOUNTER — PATIENT OUTREACH (OUTPATIENT)
Dept: CASE MANAGEMENT | Facility: HOSPITAL | Age: 60
End: 2020-05-01

## 2020-05-05 ENCOUNTER — PATIENT OUTREACH (OUTPATIENT)
Dept: CASE MANAGEMENT | Facility: HOSPITAL | Age: 60
End: 2020-05-05

## 2020-05-06 DIAGNOSIS — I85.10 SECONDARY ESOPHAGEAL VARICES WITHOUT BLEEDING (HCC): ICD-10-CM

## 2020-05-06 DIAGNOSIS — R18.8 OTHER ASCITES: ICD-10-CM

## 2020-05-06 DIAGNOSIS — I85.00 ESOPHAGEAL VARICES WITHOUT BLEEDING, UNSPECIFIED ESOPHAGEAL VARICES TYPE (HCC): ICD-10-CM

## 2020-05-07 DIAGNOSIS — R18.8 OTHER ASCITES: ICD-10-CM

## 2020-05-07 DIAGNOSIS — I85.00 ESOPHAGEAL VARICES WITHOUT BLEEDING, UNSPECIFIED ESOPHAGEAL VARICES TYPE (HCC): ICD-10-CM

## 2020-05-07 RX ORDER — PROPRANOLOL HYDROCHLORIDE 20 MG/1
20 TABLET ORAL DAILY
Qty: 30 TABLET | Refills: 2 | Status: SHIPPED | OUTPATIENT
Start: 2020-05-07 | End: 2020-09-08

## 2020-05-07 RX ORDER — SPIRONOLACTONE 50 MG/1
50 TABLET, FILM COATED ORAL DAILY
Qty: 30 TABLET | Refills: 5 | Status: SHIPPED | OUTPATIENT
Start: 2020-05-07 | End: 2020-06-01

## 2020-05-07 RX ORDER — OMEPRAZOLE 20 MG/1
CAPSULE, DELAYED RELEASE ORAL
Qty: 90 CAPSULE | OUTPATIENT
Start: 2020-05-07

## 2020-05-07 RX ORDER — FUROSEMIDE 20 MG/1
40 TABLET ORAL DAILY
Qty: 60 TABLET | Refills: 2 | Status: SHIPPED | OUTPATIENT
Start: 2020-05-07 | End: 2020-06-01

## 2020-05-07 RX ORDER — OMEPRAZOLE 20 MG/1
20 CAPSULE, DELAYED RELEASE ORAL DAILY
Qty: 30 CAPSULE | Refills: 3 | Status: SHIPPED | OUTPATIENT
Start: 2020-05-07 | End: 2020-06-01

## 2020-05-07 RX ORDER — SPIRONOLACTONE 50 MG/1
TABLET, FILM COATED ORAL
Qty: 90 TABLET | Refills: 0 | Status: SHIPPED | OUTPATIENT
Start: 2020-05-07 | End: 2020-05-08 | Stop reason: SDUPTHER

## 2020-05-13 DIAGNOSIS — Z20.822 ENCOUNTER FOR LABORATORY TESTING FOR COVID-19 VIRUS: ICD-10-CM

## 2020-05-13 PROCEDURE — U0003 INFECTIOUS AGENT DETECTION BY NUCLEIC ACID (DNA OR RNA); SEVERE ACUTE RESPIRATORY SYNDROME CORONAVIRUS 2 (SARS-COV-2) (CORONAVIRUS DISEASE [COVID-19]), AMPLIFIED PROBE TECHNIQUE, MAKING USE OF HIGH THROUGHPUT TECHNOLOGIES AS DESCRIBED BY CMS-2020-01-R: HCPCS

## 2020-05-14 ENCOUNTER — PATIENT OUTREACH (OUTPATIENT)
Dept: CASE MANAGEMENT | Facility: HOSPITAL | Age: 60
End: 2020-05-14

## 2020-05-14 PROBLEM — F41.9 ANXIETY: Status: ACTIVE | Noted: 2020-05-14

## 2020-05-14 PROBLEM — J38.1 REINKE'S EDEMA OF VOCAL FOLDS: Status: ACTIVE | Noted: 2020-05-14

## 2020-05-14 LAB — SARS-COV-2 RNA SPEC QL NAA+PROBE: NOT DETECTED

## 2020-05-19 ENCOUNTER — ANESTHESIA EVENT (OUTPATIENT)
Dept: GASTROENTEROLOGY | Facility: HOSPITAL | Age: 60
End: 2020-05-19

## 2020-05-20 ENCOUNTER — ANESTHESIA (OUTPATIENT)
Dept: GASTROENTEROLOGY | Facility: HOSPITAL | Age: 60
End: 2020-05-20

## 2020-05-20 ENCOUNTER — HOSPITAL ENCOUNTER (OUTPATIENT)
Dept: GASTROENTEROLOGY | Facility: HOSPITAL | Age: 60
Setting detail: OUTPATIENT SURGERY
Discharge: HOME | End: 2020-05-20
Attending: INTERNAL MEDICINE | Admitting: INTERNAL MEDICINE
Payer: MEDICARE

## 2020-05-20 VITALS
RESPIRATION RATE: 18 BRPM | HEIGHT: 72 IN | HEART RATE: 73 BPM | DIASTOLIC BLOOD PRESSURE: 62 MMHG | OXYGEN SATURATION: 97 % | SYSTOLIC BLOOD PRESSURE: 122 MMHG | WEIGHT: 150 LBS | BODY MASS INDEX: 20.32 KG/M2 | TEMPERATURE: 98.1 F

## 2020-05-20 DIAGNOSIS — I85.00 ESOPHAGEAL VARICES DETERMINED BY ENDOSCOPY (HCC): ICD-10-CM

## 2020-05-20 DIAGNOSIS — R13.10 DYSPHAGIA, UNSPECIFIED TYPE: ICD-10-CM

## 2020-05-20 PROCEDURE — 43244 EGD VARICES LIGATION: CPT | Performed by: INTERNAL MEDICINE

## 2020-05-20 RX ORDER — SODIUM CHLORIDE 9 MG/ML
75 INJECTION, SOLUTION INTRAVENOUS CONTINUOUS
Status: DISCONTINUED | OUTPATIENT
Start: 2020-05-20 | End: 2020-05-24 | Stop reason: HOSPADM

## 2020-05-20 RX ORDER — MIDAZOLAM HYDROCHLORIDE 2 MG/2ML
INJECTION, SOLUTION INTRAMUSCULAR; INTRAVENOUS AS NEEDED
Status: DISCONTINUED | OUTPATIENT
Start: 2020-05-20 | End: 2020-05-20 | Stop reason: SURG

## 2020-05-20 RX ORDER — PROPOFOL 10 MG/ML
INJECTION, EMULSION INTRAVENOUS AS NEEDED
Status: DISCONTINUED | OUTPATIENT
Start: 2020-05-20 | End: 2020-05-20 | Stop reason: SURG

## 2020-05-20 RX ADMIN — PROPOFOL 80 MG: 10 INJECTION, EMULSION INTRAVENOUS at 08:50

## 2020-05-20 RX ADMIN — SODIUM CHLORIDE: 0.9 INJECTION, SOLUTION INTRAVENOUS at 08:44

## 2020-05-20 RX ADMIN — MIDAZOLAM 2 MG: 1 INJECTION INTRAMUSCULAR; INTRAVENOUS at 08:49

## 2020-05-20 RX ADMIN — PROPOFOL 50 MG: 10 INJECTION, EMULSION INTRAVENOUS at 08:55

## 2020-05-20 RX ADMIN — PROPOFOL 50 MG: 10 INJECTION, EMULSION INTRAVENOUS at 08:58

## 2020-05-28 ENCOUNTER — PATIENT OUTREACH (OUTPATIENT)
Dept: CASE MANAGEMENT | Facility: HOSPITAL | Age: 60
End: 2020-05-28

## 2020-06-01 ENCOUNTER — TELEMEDICINE (OUTPATIENT)
Dept: GASTROENTEROLOGY | Facility: CLINIC | Age: 60
End: 2020-06-01
Payer: MEDICARE

## 2020-06-01 VITALS — HEIGHT: 72 IN | WEIGHT: 151 LBS | BODY MASS INDEX: 20.45 KG/M2

## 2020-06-01 DIAGNOSIS — D69.6 THROMBOCYTOPENIA (HCC): ICD-10-CM

## 2020-06-01 DIAGNOSIS — I85.00 ESOPHAGEAL VARICES WITHOUT BLEEDING, UNSPECIFIED ESOPHAGEAL VARICES TYPE (HCC): ICD-10-CM

## 2020-06-01 DIAGNOSIS — K70.30 ALCOHOLIC CIRRHOSIS OF LIVER WITHOUT ASCITES (HCC): Primary | ICD-10-CM

## 2020-06-01 DIAGNOSIS — R13.10 DYSPHAGIA, UNSPECIFIED TYPE: ICD-10-CM

## 2020-06-01 PROCEDURE — 99214 OFFICE O/P EST MOD 30 MIN: CPT | Performed by: NURSE PRACTITIONER

## 2020-06-01 RX ORDER — SPIRONOLACTONE 25 MG/1
25 TABLET ORAL DAILY
Qty: 30 TABLET | Refills: 3 | Status: SHIPPED | OUTPATIENT
Start: 2020-06-01 | End: 2020-07-08 | Stop reason: SDUPTHER

## 2020-06-01 RX ORDER — OMEPRAZOLE 40 MG/1
40 CAPSULE, DELAYED RELEASE ORAL DAILY
Qty: 30 CAPSULE | Refills: 3 | Status: SHIPPED | OUTPATIENT
Start: 2020-06-01 | End: 2020-06-17

## 2020-06-03 DIAGNOSIS — R18.8 OTHER ASCITES: ICD-10-CM

## 2020-06-03 RX ORDER — SPIRONOLACTONE 50 MG/1
TABLET, FILM COATED ORAL
Qty: 90 TABLET | OUTPATIENT
Start: 2020-06-03

## 2020-06-15 ENCOUNTER — TELEPHONE (OUTPATIENT)
Dept: GASTROENTEROLOGY | Facility: CLINIC | Age: 60
End: 2020-06-15

## 2020-06-16 ENCOUNTER — PATIENT OUTREACH (OUTPATIENT)
Dept: CASE MANAGEMENT | Facility: HOSPITAL | Age: 60
End: 2020-06-16

## 2020-06-16 ENCOUNTER — TELEPHONE (OUTPATIENT)
Dept: GASTROENTEROLOGY | Facility: CLINIC | Age: 60
End: 2020-06-16

## 2020-06-17 ENCOUNTER — OFFICE VISIT (OUTPATIENT)
Dept: GASTROENTEROLOGY | Facility: CLINIC | Age: 60
End: 2020-06-17
Payer: COMMERCIAL

## 2020-06-17 VITALS
HEIGHT: 72 IN | BODY MASS INDEX: 20.45 KG/M2 | DIASTOLIC BLOOD PRESSURE: 88 MMHG | SYSTOLIC BLOOD PRESSURE: 144 MMHG | HEART RATE: 72 BPM | WEIGHT: 151 LBS | TEMPERATURE: 99.1 F

## 2020-06-17 DIAGNOSIS — I85.00 ESOPHAGEAL VARICES WITHOUT BLEEDING, UNSPECIFIED ESOPHAGEAL VARICES TYPE (HCC): Primary | ICD-10-CM

## 2020-06-17 DIAGNOSIS — Z12.11 COLON CANCER SCREENING: ICD-10-CM

## 2020-06-17 DIAGNOSIS — R13.10 DYSPHAGIA, UNSPECIFIED TYPE: ICD-10-CM

## 2020-06-17 DIAGNOSIS — K70.31 ASCITES DUE TO ALCOHOLIC CIRRHOSIS (HCC): ICD-10-CM

## 2020-06-17 DIAGNOSIS — K70.30 ALCOHOLIC CIRRHOSIS, UNSPECIFIED WHETHER ASCITES PRESENT (HCC): ICD-10-CM

## 2020-06-17 PROCEDURE — 99214 OFFICE O/P EST MOD 30 MIN: CPT | Performed by: INTERNAL MEDICINE

## 2020-06-17 RX ORDER — PANTOPRAZOLE SODIUM 40 MG/1
40 TABLET, DELAYED RELEASE ORAL DAILY
Qty: 30 TABLET | Refills: 2 | Status: SHIPPED | OUTPATIENT
Start: 2020-06-17

## 2020-06-17 RX ORDER — FUROSEMIDE 40 MG/1
40 TABLET ORAL DAILY
Qty: 30 TABLET | Refills: 5 | Status: SHIPPED | OUTPATIENT
Start: 2020-06-17 | End: 2020-08-14 | Stop reason: HOSPADM

## 2020-06-19 ENCOUNTER — TELEPHONE (OUTPATIENT)
Dept: GASTROENTEROLOGY | Facility: CLINIC | Age: 60
End: 2020-06-19

## 2020-06-19 DIAGNOSIS — Z11.59 SCREENING FOR VIRAL DISEASE: ICD-10-CM

## 2020-06-19 PROCEDURE — U0003 INFECTIOUS AGENT DETECTION BY NUCLEIC ACID (DNA OR RNA); SEVERE ACUTE RESPIRATORY SYNDROME CORONAVIRUS 2 (SARS-COV-2) (CORONAVIRUS DISEASE [COVID-19]), AMPLIFIED PROBE TECHNIQUE, MAKING USE OF HIGH THROUGHPUT TECHNOLOGIES AS DESCRIBED BY CMS-2020-01-R: HCPCS

## 2020-06-20 LAB — SARS-COV-2 RNA SPEC QL NAA+PROBE: NOT DETECTED

## 2020-06-23 ENCOUNTER — ANESTHESIA EVENT (OUTPATIENT)
Dept: GASTROENTEROLOGY | Facility: HOSPITAL | Age: 60
End: 2020-06-23

## 2020-06-24 ENCOUNTER — ANESTHESIA (OUTPATIENT)
Dept: GASTROENTEROLOGY | Facility: HOSPITAL | Age: 60
End: 2020-06-24

## 2020-06-24 ENCOUNTER — HOSPITAL ENCOUNTER (OUTPATIENT)
Dept: GASTROENTEROLOGY | Facility: HOSPITAL | Age: 60
Setting detail: OUTPATIENT SURGERY
Discharge: HOME | End: 2020-06-24
Attending: INTERNAL MEDICINE | Admitting: INTERNAL MEDICINE
Payer: COMMERCIAL

## 2020-06-24 VITALS
HEIGHT: 73 IN | OXYGEN SATURATION: 100 % | TEMPERATURE: 97.7 F | DIASTOLIC BLOOD PRESSURE: 67 MMHG | BODY MASS INDEX: 19.88 KG/M2 | RESPIRATION RATE: 18 BRPM | SYSTOLIC BLOOD PRESSURE: 163 MMHG | WEIGHT: 150 LBS | HEART RATE: 74 BPM

## 2020-06-24 DIAGNOSIS — R18.8 OTHER ASCITES: ICD-10-CM

## 2020-06-24 DIAGNOSIS — I85.00 ESOPHAGEAL VARICES WITHOUT BLEEDING, UNSPECIFIED ESOPHAGEAL VARICES TYPE (HCC): ICD-10-CM

## 2020-06-24 DIAGNOSIS — K70.30 ALCOHOLIC CIRRHOSIS OF LIVER WITHOUT ASCITES (HCC): ICD-10-CM

## 2020-06-24 DIAGNOSIS — R13.10 DYSPHAGIA, UNSPECIFIED TYPE: ICD-10-CM

## 2020-06-24 PROCEDURE — 43244 EGD VARICES LIGATION: CPT | Performed by: INTERNAL MEDICINE

## 2020-06-24 RX ORDER — MIDAZOLAM HYDROCHLORIDE 2 MG/2ML
INJECTION, SOLUTION INTRAMUSCULAR; INTRAVENOUS AS NEEDED
Status: DISCONTINUED | OUTPATIENT
Start: 2020-06-24 | End: 2020-06-24 | Stop reason: SURG

## 2020-06-24 RX ORDER — SODIUM CHLORIDE 9 MG/ML
75 INJECTION, SOLUTION INTRAVENOUS CONTINUOUS
Status: DISCONTINUED | OUTPATIENT
Start: 2020-06-24 | End: 2020-06-28 | Stop reason: HOSPADM

## 2020-06-24 RX ORDER — PROPOFOL 10 MG/ML
INJECTION, EMULSION INTRAVENOUS AS NEEDED
Status: DISCONTINUED | OUTPATIENT
Start: 2020-06-24 | End: 2020-06-24 | Stop reason: SURG

## 2020-06-24 RX ADMIN — PROPOFOL 50 MG: 10 INJECTION, EMULSION INTRAVENOUS at 08:18

## 2020-06-24 RX ADMIN — MIDAZOLAM 2 MG: 1 INJECTION INTRAMUSCULAR; INTRAVENOUS at 08:11

## 2020-06-24 RX ADMIN — SODIUM CHLORIDE 75 ML/HR: 0.9 INJECTION, SOLUTION INTRAVENOUS at 07:14

## 2020-06-24 RX ADMIN — PROPOFOL 30 MG: 10 INJECTION, EMULSION INTRAVENOUS at 08:21

## 2020-06-24 RX ADMIN — PROPOFOL 200 MG: 10 INJECTION, EMULSION INTRAVENOUS at 08:14

## 2020-06-24 RX ADMIN — PROPOFOL 50 MG: 10 INJECTION, EMULSION INTRAVENOUS at 08:16

## 2020-06-29 ENCOUNTER — TELEPHONE (OUTPATIENT)
Dept: GASTROENTEROLOGY | Facility: CLINIC | Age: 60
End: 2020-06-29

## 2020-07-02 ENCOUNTER — TELEPHONE (OUTPATIENT)
Dept: GASTROENTEROLOGY | Facility: CLINIC | Age: 60
End: 2020-07-02

## 2020-07-02 NOTE — TELEPHONE ENCOUNTER
I checked with pharmacy and patient does have refills available but it is too soon to refill  I was not certain okay to authorize new rx  Spoke with patient  Bloating is bad enough that his "belly button is sticking out"  He does have lasix 40 mg to take which he currently is taking daily (no longer has 20 mg)  Please advise

## 2020-07-02 NOTE — TELEPHONE ENCOUNTER
Pt left  mssg stating his stomach is bloated; needs Rx for Furosemide 20 mg/is all out   CB# [phone],

## 2020-07-03 ENCOUNTER — HOSPITAL ENCOUNTER (EMERGENCY)
Facility: HOSPITAL | Age: 60
Discharge: HOME | End: 2020-07-03
Attending: EMERGENCY MEDICINE | Admitting: EMERGENCY MEDICINE
Payer: MEDICARE

## 2020-07-03 VITALS
TEMPERATURE: 97.8 F | HEIGHT: 73 IN | DIASTOLIC BLOOD PRESSURE: 76 MMHG | SYSTOLIC BLOOD PRESSURE: 123 MMHG | WEIGHT: 150 LBS | OXYGEN SATURATION: 96 % | BODY MASS INDEX: 19.88 KG/M2 | HEART RATE: 74 BPM | RESPIRATION RATE: 16 BRPM

## 2020-07-03 DIAGNOSIS — R39.15 URINARY URGENCY: Primary | ICD-10-CM

## 2020-07-03 LAB
BACTERIA UR QL AUTO: ABNORMAL /HPF
BILIRUB UR QL STRIP: NEGATIVE
CLARITY UR: CLEAR
COLOR UR: YELLOW
GLUCOSE UR STRIP-MCNC: NEGATIVE MG/DL
HGB UR QL STRIP.AUTO: ABNORMAL
KETONES UR STRIP-MCNC: NEGATIVE MG/DL
LEUKOCYTE ESTERASE UR QL STRIP: NEGATIVE
NITRITE UR QL STRIP: NEGATIVE
NON-SQ EPI CELLS URNS QL MICRO: ABNORMAL /HPF
PH UR STRIP.AUTO: 6 [PH]
PROT UR STRIP-MCNC: NEGATIVE MG/DL
RBC #/AREA URNS AUTO: ABNORMAL /HPF
SP GR UR STRIP.AUTO: 1.02 (ref 1–1.03)
UROBILINOGEN UR QL STRIP.AUTO: 4 E.U./DL
WBC #/AREA URNS AUTO: ABNORMAL /HPF

## 2020-07-03 PROCEDURE — 81001 URINALYSIS AUTO W/SCOPE: CPT | Performed by: EMERGENCY MEDICINE

## 2020-07-03 PROCEDURE — 99283 EMERGENCY DEPT VISIT LOW MDM: CPT

## 2020-07-03 PROCEDURE — 99284 EMERGENCY DEPT VISIT MOD MDM: CPT | Performed by: EMERGENCY MEDICINE

## 2020-07-04 NOTE — ED PROVIDER NOTES
History  Chief Complaint   Patient presents with    Constipation     Pt had soft stool yesterday, today is worried because he has not had a bm today  "when I go piss I feel like I gotta shit, I dont want to push too hard because I got varacies, I took a  piss when i got here today"  63 yom p/w urinary urgency that started earlier today  However, he urinated in the waiting room prior to evaluation  He has no further symptoms  Denies abd pain  Denies vomiting, fevers  Asking to leave  Prior to Admission Medications   Prescriptions Last Dose Informant Patient Reported? Taking?    albuterol (2 5 mg/3 mL) 0 083 % nebulizer solution  Self Yes No   Sig: Take 2 5 mg by nebulization every 6 (six) hours as needed for wheezing or shortness of breath   albuterol (PROVENTIL HFA,VENTOLIN HFA) 90 mcg/act inhaler  Self Yes No   Sig: Inhale 2 puffs as needed for shortness of breath   furosemide (LASIX) 40 mg tablet   No No   Sig: Take 1 tablet (40 mg total) by mouth daily   pantoprazole (PROTONIX) 40 mg tablet   No No   Sig: Take 1 tablet (40 mg total) by mouth daily   propranolol (INDERAL) 20 mg tablet  Self No No   Sig: Take 1 tablet (20 mg total) by mouth daily   spironolactone (ALDACTONE) 25 mg tablet  Self No No   Sig: Take 1 tablet (25 mg total) by mouth daily      Facility-Administered Medications: None       Past Medical History:   Diagnosis Date    Cardiac disease     Chronic left-sided headaches     Chronic pain     back and neck    Chronic pain disorder     COPD (chronic obstructive pulmonary disease) (HCC)     History of transfusion     Hypertension        Past Surgical History:   Procedure Laterality Date    BACK SURGERY      CERVICAL FUSION      CHOLECYSTECTOMY      COLONOSCOPY  11/23/2019    Internal external hemorrhoids, nonbleeding rectal varices    CORONARY ANGIOPLASTY WITH STENT PLACEMENT      2006-PTCA/STENT to mid and distal RCA; then 2009 Left-LAD normal, circumflex-normal,Proximal % stnosis, Chronic total occlusion    EGD  01/11/2006    HERNIA REPAIR      JOINT REPLACEMENT Left     knee    KNEE SURGERY      NECK SURGERY      [facility]ENTESIS      [facility]    UPPER GASTROINTESTINAL ENDOSCOPY  01/11/2006    Gastritis and duodenitis  Pathology negative for any abnormality    UPPER GASTROINTESTINAL ENDOSCOPY  11/23/2019    Grade 3 esophageal varices and portal hypertensive gastropathy       Family History   Problem Relation Age of Onset    Heart disease Father     Heart disease Brother     Cardiomyopathy Brother     Colon polyps Neg Hx     Colon cancer Neg Hx      I have reviewed and agree with the history as documented  E-Cigarette/Vaping    E-Cigarette Use Never User      E-Cigarette/Vaping Substances    Nicotine No     THC No     CBD No     Flavoring No     Other No     Unknown No      Social History     Tobacco Use    Smoking status: Current Every Day Smoker     Packs/day: 0 50     Types: Cigarettes    Smokeless tobacco: Never Used    Tobacco comment: encouraged smoking cessation   Substance Use Topics    Alcohol use: Not Currently    Drug use: No       Review of Systems   Constitutional: Negative for chills, diaphoresis and fever  HENT: Negative for congestion and rhinorrhea  Eyes: Negative for pain and visual disturbance  Respiratory: Negative for cough, shortness of breath and wheezing  Cardiovascular: Negative for chest pain and leg swelling  Gastrointestinal: Negative for abdominal pain, diarrhea, nausea and vomiting  Genitourinary: Positive for urgency  Negative for difficulty urinating, discharge, dysuria, frequency, penile pain and testicular pain  Musculoskeletal: Negative for back pain and neck pain  Skin: Negative for color change and rash  Neurological: Negative for syncope, numbness and headaches  All other systems reviewed and are negative        Physical Exam  Physical Exam   Constitutional: He is oriented to person, place, and time  He appears well-developed and well-nourished  HENT:   Head: Normocephalic and atraumatic  Eyes: Conjunctivae and EOM are normal    Neck: Normal range of motion  Neck supple  Cardiovascular: Normal rate and regular rhythm  Pulmonary/Chest: Effort normal  No respiratory distress  Abdominal: Soft  There is no tenderness  Musculoskeletal: Normal range of motion  He exhibits no tenderness  Neurological: He is alert and oriented to person, place, and time  Skin: Skin is warm  No erythema  Psychiatric: He has a normal mood and affect  His behavior is normal    Nursing note and vitals reviewed        Vital Signs  ED Triage Vitals [07/03/20 1828]   Temperature Pulse Respirations Blood Pressure SpO2   97 8 °F (36 6 °C) 86 16 154/71 97 %      Temp Source Heart Rate Source Patient Position - Orthostatic VS BP Location FiO2 (%)   Temporal Monitor Sitting Right arm --      Pain Score       --           Vitals:    07/03/20 1828 07/03/20 1830 07/03/20 1845   BP: 154/71 154/71 123/76   Pulse: 86 76 74   Patient Position - Orthostatic VS: Sitting  Sitting         Visual Acuity      ED Medications  Medications - No data to display    Diagnostic Studies  Results Reviewed     Procedure Component Value Units Date/Time    Urine Microscopic [[id number]]  (Abnormal) Collected:  07/03/20 1852    Lab Status:  Final result Specimen:  Urine, Clean Catch Updated:  07/03/20 1923     RBC, UA 0-1 /hpf      WBC, UA None Seen /hpf      Epithelial Cells None Seen /hpf      Bacteria, UA None Seen /hpf     UA w Reflex to Microscopic w Reflex to Culture [[id number]]  (Abnormal) Collected:  07/03/20 1852    Lab Status:  Final result Specimen:  Urine, Clean Catch Updated:  07/03/20 1914     Color, UA Yellow     Clarity, UA Clear     Specific Gravity, UA 1 025     pH, UA 6 0     Leukocytes, UA Negative     Nitrite, UA Negative     Protein, UA Negative mg/dl      Glucose, UA Negative mg/dl      Ketones, UA Negative mg/dl Urobilinogen, UA 4 0 E U /dl      Bilirubin, UA Negative     Blood, UA Small                 No orders to display              Procedures  Procedures         ED Course       US AUDIT      Most Recent Value   Initial Alcohol Screen: US AUDIT-C    1  How often do you have a drink containing alcohol?  0 Filed at: 07/03/2020 1828   2  How many drinks containing alcohol do you have on a typical day you are drinking? 0 Filed at: 07/03/2020 1828   3a  Male UNDER 65: How often do you have five or more drinks on one occasion? 0 Filed at: 07/03/2020 1828   3b  FEMALE Any Age, or MALE 65+: How often do you have 4 or more drinks on one occassion? 0 Filed at: 07/03/2020 1828   Audit-C Score  0 Filed at: 07/03/2020 1828                  AUSTIN/DAST-10      Most Recent Value   How many times in the past year have you    Used an illegal drug or used a prescription medication for non-medical reasons? Never Filed at: 07/03/2020 1828                                MDM  Number of Diagnoses or Management Options  Urinary urgency:   Diagnosis management comments: 63 yom with urinary urgency  Patient declined further evaluation but agreed to UA to check for infection but did not want to wait for results  Will call if UTI  FU PCP  Disposition  Final diagnoses:   Urinary urgency     Time reflects when diagnosis was documented in both MDM as applicable and the Disposition within this note     Time User Action Codes Description Comment    7/3/2020  6:52 PM Alexandria Wright Add [R39 15] Urinary urgency       ED Disposition     ED Disposition Condition Date/Time Comment    Discharge Stable Fri Jul 3, 2020  6:52 PM Brett Whelan discharge to home/self care  Follow-up Information     Follow up With Specialties Details Why Contact Info Additional [address] Emergency Department Emergency Medicine  If symptoms worsen [address] [place], [id number] [phone]  ED, [address] [facility], Manual Rebeca Aragon [address]          Discharge Medication List as of 7/3/2020  6:53 PM      CONTINUE these medications which have NOT CHANGED    Details   albuterol (2 5 mg/3 mL) 0 083 % nebulizer solution Take 2 5 mg by nebulization every 6 (six) hours as needed for wheezing or shortness of breath, Historical Med      albuterol (PROVENTIL HFA,VENTOLIN HFA) 90 mcg/act inhaler Inhale 2 puffs as needed for shortness of breath, Historical Med      furosemide (LASIX) 40 mg tablet Take 1 tablet (40 mg total) by mouth daily, Starting Wed 6/17/2020, Normal      pantoprazole (PROTONIX) 40 mg tablet Take 1 tablet (40 mg total) by mouth daily, Starting Wed 6/17/2020, Normal      propranolol (INDERAL) 20 mg tablet Take 1 tablet (20 mg total) by mouth daily, Starting Thu 5/7/2020, Normal      spironolactone (ALDACTONE) 25 mg tablet Take 1 tablet (25 mg total) by mouth daily, Starting Mon 6/1/2020, Normal           No discharge procedures on file      PDMP Review       Value Time User    PDMP Reviewed  Yes 1/4/2020 11:59 AM Pablo Rodríguez MD          ED Provider  Electronically Signed by           Jose Juan Kirkland DO  07/04/20 4170

## 2020-07-06 ENCOUNTER — TELEPHONE (OUTPATIENT)
Dept: GASTROENTEROLOGY | Facility: CLINIC | Age: 60
End: 2020-07-06

## 2020-07-06 ENCOUNTER — HOSPITAL ENCOUNTER (EMERGENCY)
Facility: HOSPITAL | Age: 60
Discharge: LEFT BEFORE DISCHARGE | End: 2020-07-06
Attending: EMERGENCY MEDICINE | Admitting: EMERGENCY MEDICINE
Payer: COMMERCIAL

## 2020-07-06 VITALS
SYSTOLIC BLOOD PRESSURE: 143 MMHG | WEIGHT: 150 LBS | BODY MASS INDEX: 19.79 KG/M2 | OXYGEN SATURATION: 96 % | RESPIRATION RATE: 16 BRPM | HEART RATE: 82 BPM | TEMPERATURE: 97.9 F | DIASTOLIC BLOOD PRESSURE: 63 MMHG

## 2020-07-06 DIAGNOSIS — E87.6 HYPOKALEMIA: Primary | ICD-10-CM

## 2020-07-06 DIAGNOSIS — D64.9 CHRONIC ANEMIA: ICD-10-CM

## 2020-07-06 DIAGNOSIS — K59.00 CONSTIPATION: ICD-10-CM

## 2020-07-06 LAB
ALBUMIN SERPL BCP-MCNC: 3.7 G/DL (ref 3.5–5)
ALP SERPL-CCNC: 220 U/L (ref 46–116)
ALT SERPL W P-5'-P-CCNC: 13 U/L (ref 12–78)
AMMONIA PLAS-SCNC: 32 UMOL/L (ref 11–35)
ANION GAP SERPL CALCULATED.3IONS-SCNC: 6 MMOL/L (ref 4–13)
APTT PPP: 38 SECONDS (ref 23–37)
AST SERPL W P-5'-P-CCNC: 22 U/L (ref 5–45)
BASOPHILS # BLD AUTO: 0.01 THOUSANDS/ΜL (ref 0–0.1)
BASOPHILS NFR BLD AUTO: 0 % (ref 0–1)
BILIRUB SERPL-MCNC: 1.2 MG/DL (ref 0.2–1)
BUN SERPL-MCNC: 7 MG/DL (ref 5–25)
CALCIUM SERPL-MCNC: 7.8 MG/DL (ref 8.3–10.1)
CHLORIDE SERPL-SCNC: 91 MMOL/L (ref 100–108)
CLARITY, POC: CLEAR
CO2 SERPL-SCNC: 30 MMOL/L (ref 21–32)
COLOR, POC: YELLOW
CREAT SERPL-MCNC: 1.05 MG/DL (ref 0.6–1.3)
EOSINOPHIL # BLD AUTO: 0.03 THOUSAND/ΜL (ref 0–0.61)
EOSINOPHIL NFR BLD AUTO: 1 % (ref 0–6)
ERYTHROCYTE [DISTWIDTH] IN BLOOD BY AUTOMATED COUNT: 18.2 % (ref 11.6–15.1)
EXT BILIRUBIN, UA: NEGATIVE
EXT BLOOD URINE: NEGATIVE
EXT GLUCOSE, UA: NEGATIVE
EXT KETONES: NEGATIVE
EXT NITRITE, UA: NEGATIVE
EXT PH, UA: 6.5
EXT PROTEIN, UA: NEGATIVE
EXT SPECIFIC GRAVITY, UA: 1.01
EXT UROBILINOGEN: 1
GFR SERPL CREATININE-BSD FRML MDRD: 78 ML/MIN/1.73SQ M
GLUCOSE SERPL-MCNC: 112 MG/DL (ref 65–140)
HCT VFR BLD AUTO: 28.5 % (ref 36.5–49.3)
HGB BLD-MCNC: 8.9 G/DL (ref 12–17)
IMM GRANULOCYTES # BLD AUTO: 0.01 THOUSAND/UL (ref 0–0.2)
IMM GRANULOCYTES NFR BLD AUTO: 0 % (ref 0–2)
INR PPP: 1.34 (ref 0.84–1.19)
LYMPHOCYTES # BLD AUTO: 0.37 THOUSANDS/ΜL (ref 0.6–4.47)
LYMPHOCYTES NFR BLD AUTO: 10 % (ref 14–44)
MCH RBC QN AUTO: 22.5 PG (ref 26.8–34.3)
MCHC RBC AUTO-ENTMCNC: 31.2 G/DL (ref 31.4–37.4)
MCV RBC AUTO: 72 FL (ref 82–98)
MONOCYTES # BLD AUTO: 0.32 THOUSAND/ΜL (ref 0.17–1.22)
MONOCYTES NFR BLD AUTO: 9 % (ref 4–12)
NEUTROPHILS # BLD AUTO: 2.87 THOUSANDS/ΜL (ref 1.85–7.62)
NEUTS SEG NFR BLD AUTO: 80 % (ref 43–75)
NRBC BLD AUTO-RTO: 0 /100 WBCS
PLATELET # BLD AUTO: 61 THOUSANDS/UL (ref 149–390)
PMV BLD AUTO: 10.6 FL (ref 8.9–12.7)
POTASSIUM SERPL-SCNC: 2.5 MMOL/L (ref 3.5–5.3)
PROT SERPL-MCNC: 7.7 G/DL (ref 6.4–8.2)
PROTHROMBIN TIME: 16.3 SECONDS (ref 11.6–14.5)
RBC # BLD AUTO: 3.96 MILLION/UL (ref 3.88–5.62)
SODIUM SERPL-SCNC: 127 MMOL/L (ref 136–145)
WBC # BLD AUTO: 3.61 THOUSAND/UL (ref 4.31–10.16)
WBC # BLD EST: NEGATIVE 10*3/UL

## 2020-07-06 PROCEDURE — 81002 URINALYSIS NONAUTO W/O SCOPE: CPT | Performed by: EMERGENCY MEDICINE

## 2020-07-06 PROCEDURE — 93005 ELECTROCARDIOGRAM TRACING: CPT

## 2020-07-06 PROCEDURE — 80053 COMPREHEN METABOLIC PANEL: CPT | Performed by: EMERGENCY MEDICINE

## 2020-07-06 PROCEDURE — 36415 COLL VENOUS BLD VENIPUNCTURE: CPT | Performed by: EMERGENCY MEDICINE

## 2020-07-06 PROCEDURE — 82140 ASSAY OF AMMONIA: CPT | Performed by: EMERGENCY MEDICINE

## 2020-07-06 PROCEDURE — 99284 EMERGENCY DEPT VISIT MOD MDM: CPT | Performed by: EMERGENCY MEDICINE

## 2020-07-06 PROCEDURE — 85610 PROTHROMBIN TIME: CPT | Performed by: EMERGENCY MEDICINE

## 2020-07-06 PROCEDURE — 85025 COMPLETE CBC W/AUTO DIFF WBC: CPT | Performed by: EMERGENCY MEDICINE

## 2020-07-06 PROCEDURE — 85730 THROMBOPLASTIN TIME PARTIAL: CPT | Performed by: EMERGENCY MEDICINE

## 2020-07-06 PROCEDURE — 99283 EMERGENCY DEPT VISIT LOW MDM: CPT

## 2020-07-06 RX ORDER — POTASSIUM CHLORIDE 14.9 MG/ML
20 INJECTION INTRAVENOUS ONCE
Status: DISCONTINUED | OUTPATIENT
Start: 2020-07-06 | End: 2020-07-06 | Stop reason: HOSPADM

## 2020-07-06 RX ORDER — SODIUM CHLORIDE 9 MG/ML
75 INJECTION, SOLUTION INTRAVENOUS CONTINUOUS
Status: DISCONTINUED | OUTPATIENT
Start: 2020-07-06 | End: 2020-07-06 | Stop reason: HOSPADM

## 2020-07-06 RX ORDER — POTASSIUM CHLORIDE 20 MEQ/1
40 TABLET, EXTENDED RELEASE ORAL ONCE
Status: DISCONTINUED | OUTPATIENT
Start: 2020-07-06 | End: 2020-07-06 | Stop reason: HOSPADM

## 2020-07-06 RX ORDER — POTASSIUM CHLORIDE 20 MEQ/1
20 TABLET, EXTENDED RELEASE ORAL 2 TIMES DAILY
Qty: 6 TABLET | Refills: 0 | Status: SHIPPED | OUTPATIENT
Start: 2020-07-06 | End: 2020-07-08 | Stop reason: SDUPTHER

## 2020-07-06 NOTE — ED PROVIDER NOTES
History  Chief Complaint   Patient presents with    Constipation     c/o constipation x 4 days  Pt takes percocet daily  63year old male presents for evaluation of constipation and difficulty urinating  Patient states he has not had a bowel movement in 4 days  He started taking lactulose yesterday, but has still not been able to have a bowel movement  He states he increased his lasix yesterday, but feels that he is not completely emptying his bladder  Patient denies abdominal pain  No fevers, chills, cough or congestion  No chest pain or shortness of breath  Patient denies lightheadedness, dizziness or confusion  Patient states he recently started narcotics for chronic neck and back pain  History provided by:  Patient  Constipation   Severity:  Moderate  Time since last bowel movement:  4 days  Timing:  Constant  Progression:  Unchanged  Chronicity:  Recurrent  Context: narcotics    Stool description:  None produced  Relieved by:  Nothing  Worsened by:  Narcotic pain medications  Ineffective treatments: lactulose  Associated symptoms: no abdominal pain, no diarrhea, no dysuria, no fever, no nausea and no vomiting    Risk factors comment:  Cirrhosis      Prior to Admission Medications   Prescriptions Last Dose Informant Patient Reported? Taking?    albuterol (2 5 mg/3 mL) 0 083 % nebulizer solution  Self Yes No   Sig: Take 2 5 mg by nebulization every 6 (six) hours as needed for wheezing or shortness of breath   albuterol (PROVENTIL HFA,VENTOLIN HFA) 90 mcg/act inhaler  Self Yes No   Sig: Inhale 2 puffs as needed for shortness of breath   furosemide (LASIX) 40 mg tablet   No No   Sig: Take 1 tablet (40 mg total) by mouth daily   pantoprazole (PROTONIX) 40 mg tablet   No No   Sig: Take 1 tablet (40 mg total) by mouth daily   propranolol (INDERAL) 20 mg tablet  Self No No   Sig: Take 1 tablet (20 mg total) by mouth daily   spironolactone (ALDACTONE) 25 mg tablet  Self No No   Sig: Take 1 tablet (25 mg total) by mouth daily      Facility-Administered Medications: None       Past Medical History:   Diagnosis Date    Cardiac disease     Chronic left-sided headaches     Chronic pain     back and neck    Chronic pain disorder     COPD (chronic obstructive pulmonary disease) ([address] )     History of transfusion     Hypertension        Past Surgical History:   Procedure Laterality Date    BACK SURGERY      CERVICAL FUSION      CHOLECYSTECTOMY      COLONOSCOPY  11/23/2019    Internal external hemorrhoids, nonbleeding rectal varices    CORONARY ANGIOPLASTY WITH STENT PLACEMENT      2006-PTCA/STENT to mid and distal RCA; then 2009 Left-LAD normal, circumflex-normal,Proximal % stnosis, Chronic total occlusion    EGD  01/11/2006    HERNIA REPAIR      JOINT REPLACEMENT Left     knee    KNEE SURGERY      NECK SURGERY      [facility]IS      [facility]    UPPER GASTROINTESTINAL ENDOSCOPY  01/11/2006    Gastritis and duodenitis  Pathology negative for any abnormality    UPPER GASTROINTESTINAL ENDOSCOPY  11/23/2019    Grade 3 esophageal varices and portal hypertensive gastropathy       Family History   Problem Relation Age of Onset    Heart disease Father     Heart disease Brother     Cardiomyopathy Brother     Colon polyps Neg Hx     Colon cancer Neg Hx      I have reviewed and agree with the history as documented  E-Cigarette/Vaping    E-Cigarette Use Never User      E-Cigarette/Vaping Substances    Nicotine No     THC No     CBD No     Flavoring No     Other No     Unknown No      Social History     Tobacco Use    Smoking status: Current Every Day Smoker     Packs/day: 0 50     Types: Cigarettes    Smokeless tobacco: Never Used    Tobacco comment: encouraged smoking cessation   Substance Use Topics    Alcohol use: Not Currently    Drug use: No       Review of Systems   Constitutional: Negative for appetite change, diaphoresis, fatigue and fever     HENT: Negative for congestion, rhinorrhea and sore throat  Respiratory: Negative for cough, chest tightness and shortness of breath  Cardiovascular: Negative for chest pain, palpitations and leg swelling  Gastrointestinal: Positive for abdominal distention and constipation  Negative for abdominal pain, diarrhea, nausea and vomiting  Genitourinary: Positive for difficulty urinating  Negative for dysuria, frequency and hematuria  Musculoskeletal: Negative for myalgias, neck pain and neck stiffness  Skin: Negative for pallor  Neurological: Negative for syncope, weakness and headaches  All other systems reviewed and are negative  Physical Exam  Physical Exam   Constitutional: He appears well-developed and well-nourished  Non-toxic appearance  No distress  HENT:   Head: Normocephalic and atraumatic  Eyes: Pupils are equal, round, and reactive to light  EOM are normal    Neck: Normal range of motion  No tracheal deviation present  No thyromegaly present  Cardiovascular: Normal rate, regular rhythm, normal heart sounds and intact distal pulses  Pulmonary/Chest: Effort normal and breath sounds normal    Abdominal: Soft  Bowel sounds are normal  He exhibits distension (mild, soft)  There is no tenderness  Lymphadenopathy:     He has no cervical adenopathy  Skin: Skin is warm and dry  He is not diaphoretic  Nursing note and vitals reviewed        Vital Signs  ED Triage Vitals [07/06/20 1825]   Temperature Pulse Respirations Blood Pressure SpO2   97 9 °F (36 6 °C) 82 16 143/63 96 %      Temp src Heart Rate Source Patient Position - Orthostatic VS BP Location FiO2 (%)   -- Monitor Lying Right arm --      Pain Score       7           Vitals:    07/06/20 1825   BP: 143/63   Pulse: 82   Patient Position - Orthostatic VS: Lying         Visual Acuity      ED Medications  Medications   potassium chloride (K-DUR,KLOR-CON) CR tablet 40 mEq (40 mEq Oral Not Given 7/6/20 2022)   potassium chloride 20 mEq IVPB (premix) (20 mEq Intravenous Not Given 7/6/20 2022)   sodium chloride 0 9 % infusion (75 mL/hr Intravenous Not Given 7/6/20 2022)       Diagnostic Studies  Results Reviewed     Procedure Component Value Units Date/Time    POCT urinalysis dipstick [[id number]]  (Normal) Resulted:  07/06/20 2015    Lab Status:  Final result Specimen:  Urine Updated:  07/06/20 2015     Color, UA yellow     Clarity, UA clear     Glucose, UA (Ref: Negative) negative     Bilirubin, UA (Ref: Negative) negative     Ketones, UA (Ref: Negative) negative     Spec Grav, UA (Ref:1 003-1 030) 1 010     Blood, UA (Ref: Negative) negative     pH, UA (Ref: 4 5-8 0) 6 5     Protein, UA (Ref: Negative) negative     Urobilinogen, UA (Ref: 0 2- 1 0) 1      Leukocytes, UA (Ref: Negative) negative     Nitrite, UA (Ref: Negative) negative    CBC and differential [[id number]]  (Abnormal) Collected:  07/06/20 1844    Lab Status:  Final result Specimen:  Blood from Arm, Right Updated:  07/06/20 1937     WBC 3 61 Thousand/uL      RBC 3 96 Million/uL      Hemoglobin 8 9 g/dL      Hematocrit 28 5 %      MCV 72 fL      MCH 22 5 pg      MCHC 31 2 g/dL      RDW 18 2 %      MPV 10 6 fL      Platelets 61 Thousands/uL      nRBC 0 /100 WBCs      Neutrophils Relative 80 %      Immat GRANS % 0 %      Lymphocytes Relative 10 %      Monocytes Relative 9 %      Eosinophils Relative 1 %      Basophils Relative 0 %      Neutrophils Absolute 2 87 Thousands/µL      Immature Grans Absolute 0 01 Thousand/uL      Lymphocytes Absolute 0 37 Thousands/µL      Monocytes Absolute 0 32 Thousand/µL      Eosinophils Absolute 0 03 Thousand/µL      Basophils Absolute 0 01 Thousands/µL     Comprehensive metabolic panel [[id number]]  (Abnormal) Collected:  07/06/20 1844    Lab Status:  Final result Specimen:  Blood from Arm, Right Updated:  07/06/20 1932     Sodium 127 mmol/L      Potassium 2 5 mmol/L      Chloride 91 mmol/L      CO2 30 mmol/L      ANION GAP 6 mmol/L      BUN 7 mg/dL      Creatinine 1 05 mg/dL      Glucose 112 mg/dL      Calcium 7 8 mg/dL      AST 22 U/L      ALT 13 U/L      Alkaline Phosphatase 220 U/L      Total Protein 7 7 g/dL      Albumin 3 7 g/dL      Total Bilirubin 1 20 mg/dL      eGFR 78 ml/min/1 73sq m     Narrative:       [place] guidelines for Chronic Kidney Disease (CKD):     Stage 1 with normal or high GFR (GFR > 90 mL/min/1 73 square meters)    Stage 2 Mild CKD (GFR = 60-89 mL/min/1 73 square meters)    Stage 3A Moderate CKD (GFR = 45-59 mL/min/1 73 square meters)    Stage 3B Moderate CKD (GFR = 30-44 mL/min/1 73 square meters)    Stage 4 Severe CKD (GFR = 15-29 mL/min/1 73 square meters)    Stage 5 End Stage CKD (GFR <15 mL/min/1 73 square meters)  Note: GFR calculation is accurate only with a steady state creatinine    Ammonia [[id number]]  (Normal) Collected:  07/06/20 1844    Lab Status:  Final result Specimen:  Blood from Arm, Right Updated:  07/06/20 1910     Ammonia 32 umol/L     Protime-INR [[id number]]  (Abnormal) Collected:  07/06/20 1844    Lab Status:  Final result Specimen:  Blood from Arm, Right Updated:  07/06/20 1909     Protime 16 3 seconds      INR 1 34    APTT [[id number]]  (Abnormal) Collected:  07/06/20 1844    Lab Status:  Final result Specimen:  Blood from Arm, Right Updated:  07/06/20 1909     PTT 38 seconds                  No orders to display              Procedures  ECG 12 Lead Documentation Only  Date/Time: 7/6/2020 8:02 PM  Performed by: Martha Walters MD  Authorized by: Martha Walters MD     Indications / Diagnosis:  Hypokalemia  ECG reviewed by me, the ED Provider: yes    Patient location:  ED  Previous ECG:     Previous ECG:  Compared to current    Comparison ECG info:  4/3/20 normal sinus rhythm with twave inversion in V3 and prolonged QTc    Similarity:  Changes noted (new twave inversion in III)  Interpretation:     Interpretation: abnormal    Rate:     ECG rate:  69    ECG rate assessment: normal    Rhythm:     Rhythm: sinus rhythm Ectopy:     Ectopy: none    QRS:     QRS axis:  Normal    QRS intervals:  Normal  Conduction:     Conduction: normal    ST segments:     ST segments:  Normal  T waves:     T waves: inverted      Inverted:  III  Other findings:     Other findings: prolonged qTc interval               ED Course  ED Course as of Jul 06 2139   [facility] Jul 06, 2020 1958 EKG ordered  Patient refusing IV potassium   Potassium(!!): 2 5       US AUDIT      Most Recent Value   Initial Alcohol Screen: US AUDIT-C    1  How often do you have a drink containing alcohol?  0 Filed at: 07/06/2020 1824   2  How many drinks containing alcohol do you have on a typical day you are drinking? 0 Filed at: 07/06/2020 1824   3a  Male UNDER 65: How often do you have five or more drinks on one occasion? 0 Filed at: 07/06/2020 1824   3b  FEMALE Any Age, or MALE 65+: How often do you have 4 or more drinks on one occassion? 0 Filed at: 07/06/2020 1824   Audit-C Score  0 Filed at: 07/06/2020 1824                  AUSTIN/DAST-10      Most Recent Value   How many times in the past year have you    Used an illegal drug or used a prescription medication for non-medical reasons? Never Filed at: 07/06/2020 1824                                MDM  Number of Diagnoses or Management Options  Chronic anemia: new and requires workup  Constipation: new and requires workup  Hypokalemia: new and requires workup  Diagnosis management comments: 63year old male presents for evaluation of constipation despite taking miralax  No abdominal pain or fever  Patient alert and oriented on arrival  No asterixis  Ammonia level normal  Labs significant for severe hypokalemia  New twave inversion in lead III  No chest pain, palpitations or shortness of breath  Soap suds enema given with relief of constipation  Patient able to urinate after defecation  Patient informed of significantly decreased potasium level  He refuses to stay for IV potassium   Oral potassium ordered; however, patient refused to swallow the medication  Patient informed that this level of potassium is potentially life threatening and can result in sudden death  Patient states he understands this risk and signed out against medical advise  Amount and/or Complexity of Data Reviewed  Clinical lab tests: ordered and reviewed    Patient Progress  Patient progress: stable        Disposition  Final diagnoses:   Hypokalemia   Constipation   Chronic anemia     Time reflects when diagnosis was documented in both MDM as applicable and the Disposition within this note     Time User Action Codes Description Comment    7/6/2020  7:59 PM Roxana Lo Add [E87 6] Hypokalemia     7/6/2020  7:59 PM Roxana Lo Add [K59 00] Constipation     7/6/2020  8:00 PM Roxana Lo Add [D64 9] Chronic anemia       ED Disposition     ED Disposition Condition Date/Time Comment    JONFÉLIX  Mon Jul 6, 2020  7:59 PM Date: 7/6/2020  Patient: Hola Childers  Admitted: 7/6/2020  6:21 PM  Attending Provider: Miriam Schwarz MD    Hola Childers or his authorized caregiver has made the decision for the patient to leave the emergency department against the advice of his atte nding physician  He or his authorized caregiver has been informed and understands the inherent risks, including death, arrhythmia, sudden cardiac arrest   He or his authorized caregiver has decided to accept the responsibility for this decision  Hola Childers and all necessary parties have been advised that he may return for further evaluation or treatment  His condition at time of discharge was stable    Hola Childers had current vital signs as follows:  /63 (BP Location: Right arm)   Pulse 82    Temp 97 9 °F (36 6 °C)   Resp 16   Wt 68 kg (150 lb)         Follow-up Information     Follow up With Specialties Details Why Contact Info Additional Information    Mary Jo Short  Schedule an appointment as soon as possible for a visit in 2 days for re-evaluation including recheck of your potassium [address] [facility] Emergency Department Emergency Medicine Go to  If symptoms worsen, palpitations, chest pain, lightheadedness or passing out, abdominal pain, fever [address] [id number] [phone]  ED, [address], [facility], [facility] [address]          Discharge Medication List as of 7/6/2020  8:05 PM      START taking these medications    Details   potassium chloride (K-DUR,KLOR-CON) 20 mEq tablet Take 1 tablet (20 mEq total) by mouth 2 (two) times a day for 3 days, Starting Mon 7/6/2020, Until Thu 7/9/2020, Normal         CONTINUE these medications which have NOT CHANGED    Details   albuterol (2 5 mg/3 mL) 0 083 % nebulizer solution Take 2 5 mg by nebulization every 6 (six) hours as needed for wheezing or shortness of breath, Historical Med      albuterol (PROVENTIL HFA,VENTOLIN HFA) 90 mcg/act inhaler Inhale 2 puffs as needed for shortness of breath, Historical Med      furosemide (LASIX) 40 mg tablet Take 1 tablet (40 mg total) by mouth daily, Starting Wed 6/17/2020, Normal      pantoprazole (PROTONIX) 40 mg tablet Take 1 tablet (40 mg total) by mouth daily, Starting Wed 6/17/2020, Normal      propranolol (INDERAL) 20 mg tablet Take 1 tablet (20 mg total) by mouth daily, Starting Thu 5/7/2020, Normal      spironolactone (ALDACTONE) 25 mg tablet Take 1 tablet (25 mg total) by mouth daily, Starting Mon 6/1/2020, Normal           No discharge procedures on file      PDMP Review       Value Time User    PDMP Reviewed  Yes 1/4/2020 11:59 AM Kenia Lau MD          ED Provider  Electronically Signed by           Lindsey Carlin MD  07/06/20 4892

## 2020-07-06 NOTE — TELEPHONE ENCOUNTER
Patient called the answering service asking for a callback  He stated that he has not had a BM in for days and is afraid to eat now  Please contact him at [phone]

## 2020-07-06 NOTE — ED NOTES
Pt aware that a urine sample is needed  Attempted to urinate in a urinal and was unable  Will continue to try        Lorena Pak RN  07/06/20 9079

## 2020-07-07 LAB
ATRIAL RATE: 69 BPM
P AXIS: 63 DEGREES
PR INTERVAL: 194 MS
QRS AXIS: 89 DEGREES
QRSD INTERVAL: 102 MS
QT INTERVAL: 448 MS
QTC INTERVAL: 480 MS
T WAVE AXIS: 15 DEGREES
VENTRICULAR RATE: 69 BPM

## 2020-07-07 PROCEDURE — 93010 ELECTROCARDIOGRAM REPORT: CPT | Performed by: INTERNAL MEDICINE

## 2020-07-07 NOTE — ED NOTES
Pt requesting to leave, does not want to stay for IV potassium, states "I have to get home " Dr Winnie Flores made aware of same and talked to patient  Educated risk vs benefit       Jerod Bernabe RN  07/06/20 2005

## 2020-07-07 NOTE — ED NOTES
Pt refused all oral medications  Pt states unable to swallow pills  Alternatives offered  Pt refused  Dr Ariadna Paniagua, RN  07/06/20 2023

## 2020-07-07 NOTE — DISCHARGE INSTRUCTIONS
Hypokalemia   WHAT YOU NEED TO KNOW:   Hypokalemia is a low level of potassium in your blood  Potassium helps control how your muscles, heart, and digestive system work  Hypokalemia occurs when your body loses too much potassium or does not absorb enough from food  DISCHARGE INSTRUCTIONS:   Return to the emergency department if:   · You cannot move your arm or leg  · You have a fast or irregular heartbeat  · You are too tired or weak to stand up  Contact your healthcare provider if:   · You are vomiting, or you have diarrhea  · You have numbness or tingling in your arms or legs  · Your symptoms do not go away or they get worse  · You have questions or concerns about your condition or care  Medicines:   · Potassium  will be given to bring your potassium levels back to normal     · Take your medicine as directed  Contact your healthcare provider if you think your medicine is not helping or if you have side effects  Tell him of her if you are allergic to any medicine  Keep a list of the medicines, vitamins, and herbs you take  Include the amounts, and when and why you take them  Bring the list or the pill bottles to follow-up visits  Carry your medicine list with you in case of an emergency  Eat foods that are high in potassium:  Foods that are high in potassium include bananas, oranges, tomatoes, potatoes, and avocado  Mello beans, turkey, salmon, lean beef, yogurt, and milk are also high in potassium  Ask your healthcare provider or dietitian for more information about foods that are high in potassium  Follow up with your healthcare provider as directed:  Write down your questions so you remember to ask them during your visits  © 2017 [employer] [facility] Information is for End User's use only and may not be sold, redistributed or otherwise used for commercial purposes   All illustrations and images included in CareNotes® are the copyrighted property of [employer]  or [employer]  The above information is an  only  It is not intended as medical advice for individual conditions or treatments  Talk to your doctor, nurse or pharmacist before following any medical regimen to see if it is safe and effective for you  Constipation   WHAT YOU NEED TO KNOW:   Constipation is when you have hard, dry bowel movements, or you go longer than usual between bowel movements  DISCHARGE INSTRUCTIONS:   Return to the emergency department if:   · You have blood in your bowel movements  · You have a fever and abdominal pain with the constipation  Contact your healthcare provider if:   · Your constipation gets worse  · You start to vomit  · You have questions or concerns about your condition or care  Medicines:   · Medicine or a fiber supplement  may help make your bowel movement softer  A laxative may help relax and loosen your intestines to help you have a bowel movement  You may also be given medicine to increase fluid in your intestines  The fluid may help move bowel movements through your intestines  · Take your medicine as directed  Contact your healthcare provider if you think your medicine is not helping or if you have side effects  Tell him of her if you are allergic to any medicine  Keep a list of the medicines, vitamins, and herbs you take  Include the amounts, and when and why you take them  Bring the list or the pill bottles to follow-up visits  Carry your medicine list with you in case of an emergency  Manage your constipation:   · Drink liquids as directed  You may need to drink extra liquids to help soften and move your bowels  Ask how much liquid to drink each day and which liquids are best for you  · Eat high-fiber foods  This may help decrease constipation by adding bulk to your bowel movements  High-fiber foods include fruit, vegetables, whole-grain breads and cereals, and beans   Your healthcare provider or dietitian can help you create a high-fiber meal plan  · Exercise regularly  Regular physical activity can help stimulate your intestines  Ask which exercises are best for you  · Schedule a time each day to have a bowel movement  This may help train your body to have regular bowel movements  Bend forward while you are on the toilet to help move the bowel movement out  Sit on the toilet for at least 10 minutes, even if you do not have a bowel movement  Follow up with your healthcare provider as directed:  Write down your questions so you remember to ask them during your visits  © 2017 [employer] Information is for End User's use only and may not be sold, redistributed or otherwise used for commercial purposes  All illustrations and images included in CareNotes® are the copyrighted property of [employer]  or Colton Jenkins  The above information is an  only  It is not intended as medical advice for individual conditions or treatments  Talk to your doctor, nurse or pharmacist before following any medical regimen to see if it is safe and effective for you

## 2020-07-07 NOTE — TELEPHONE ENCOUNTER
I returned call to patient and he states he hasn't moved bowels X 4 days  He took daily dose of MiraLax with no results  I reviewed that he can take twice a day and to keep hydrated but he states he would prefer going to the ER for evaluation  I did ask what he is eating and he states white rice and beans only  167.9

## 2020-07-08 ENCOUNTER — OFFICE VISIT (OUTPATIENT)
Dept: GASTROENTEROLOGY | Facility: CLINIC | Age: 60
End: 2020-07-08
Payer: COMMERCIAL

## 2020-07-08 VITALS
DIASTOLIC BLOOD PRESSURE: 66 MMHG | BODY MASS INDEX: 19.88 KG/M2 | TEMPERATURE: 97.4 F | HEIGHT: 73 IN | WEIGHT: 150 LBS | SYSTOLIC BLOOD PRESSURE: 120 MMHG | HEART RATE: 58 BPM

## 2020-07-08 DIAGNOSIS — E87.6 HYPOKALEMIA: Primary | ICD-10-CM

## 2020-07-08 DIAGNOSIS — I85.00 ESOPHAGEAL VARICES DETERMINED BY ENDOSCOPY (HCC): ICD-10-CM

## 2020-07-08 DIAGNOSIS — K70.30 ALCOHOLIC CIRRHOSIS OF LIVER WITHOUT ASCITES (HCC): Primary | ICD-10-CM

## 2020-07-08 DIAGNOSIS — K70.30 ALCOHOLIC CIRRHOSIS OF LIVER WITHOUT ASCITES (HCC): ICD-10-CM

## 2020-07-08 PROCEDURE — 99214 OFFICE O/P EST MOD 30 MIN: CPT | Performed by: NURSE PRACTITIONER

## 2020-07-08 RX ORDER — POTASSIUM CHLORIDE 20 MEQ/1
20 TABLET, EXTENDED RELEASE ORAL 2 TIMES DAILY
Qty: 14 TABLET | Refills: 0 | Status: SHIPPED | OUTPATIENT
Start: 2020-07-08 | End: 2020-07-31 | Stop reason: ALTCHOICE

## 2020-07-08 RX ORDER — SPIRONOLACTONE 25 MG/1
50 TABLET ORAL DAILY
Qty: 30 TABLET | Refills: 3 | Status: SHIPPED | OUTPATIENT
Start: 2020-07-08 | End: 2020-08-14 | Stop reason: HOSPADM

## 2020-07-08 RX ORDER — LACTULOSE 20 G/30ML
20 SOLUTION ORAL 3 TIMES DAILY
Qty: 2700 ML | Refills: 5 | Status: SHIPPED | OUTPATIENT
Start: 2020-07-08

## 2020-07-08 NOTE — PROGRESS NOTES
[id number] - Outpatient Follow-up Note  Alex Perry 63 y o  male MRN: [id number]  Encounter: [id number]    ASSESSMENT AND PLAN:      1  Alcoholic cirrhosis of liver with ascites (HCC)  History of alcoholic cirrhosis  Abstinent from all alcohol for two and half years  Decompensated with ascites,esophageal varices, pancytopenia, and coagulopathy     - [facility]  surveillance every 6 months to include blood work and hepatic ultrasound  - spironolactone (ALDACTONE) 25 mg tablet; Take 2 tablets (50 mg total) by mouth daily  Dispense: 30 tablet; Refill: 3    2  Esophageal varices determined by endoscopy [facility])  He has undergone multiple upper endoscopies since February of this year with subsequent banding for varices  Recent EGD performed 06/24/2020 showed multiple medium and circumferential grade 3 varices without bleeding (red ace sign); 5 bands placed  - continue propanolol  - EGD in 4-6 weeks    3  Hypokalemia  Recent discovery of hypokalemia with potassium 2   5mmol/L 2 days ago when he presented to the ER   - potassium chloride (K-DUR,KLOR-CON) 20 mEq tablet; Take 1 tablet (20 mEq total) by mouth 2 (two) times a day for 7 days  Dispense: 14 tablet; Refill: 0  - increase Aldactone to 50 mg daily  - Hold Lasix for at least 5 days  May be able to resume following BMP that has been ordered for next week  - Basic metabolic panel; Future      4  History of colon polyps   5  Screening colonoscopy   Pt had colonoscopy at [address] [place] [address] on 11/23/19 which showed large internal and external hemorrhoids, rectal varices without bleeding and tubular adenoma  A five year recall advised  6  Constipation  Recent emergency room visitation for constipation  Previously was taking lactulose but then self discontinued this      - resume lactulose and titrate accordingly    Followup Appointment:  3 months  ______________________________________________________________________    Chief Complaint   Patient presents with    Follow up to EGD     HPI:   Presents to the office following recent emergency room visitation for constipation difficulty urinating for a few days  He previously was taking lactulose and was having 2-3 bowel movements daily but self discontinued this recently  Denies any rectal bleeding, melena, abdominal pain, nausea, vomiting, change in weight, confusion, skin rashes or lesions, arthralgias or myalgias  Historical Information   Past Medical History:   Diagnosis Date    Cardiac disease     Chronic left-sided headaches     Chronic pain     back and neck    Chronic pain disorder     COPD (chronic obstructive pulmonary disease) (HCC)     History of transfusion     Hypertension      Past Surgical History:   Procedure Laterality Date    BACK SURGERY      CERVICAL FUSION      CHOLECYSTECTOMY      COLONOSCOPY  11/23/2019    Internal external hemorrhoids, nonbleeding rectal varices    CORONARY ANGIOPLASTY WITH STENT PLACEMENT      2006-PTCA/STENT to mid and distal RCA; then 2009 Left-LAD normal, circumflex-normal,Proximal % stnosis, Chronic total occlusion    EGD  01/11/2006    HERNIA REPAIR      JOINT REPLACEMENT Left     knee    KNEE SURGERY      NECK SURGERY      PARACENTESIS      [facility]    UPPER GASTROINTESTINAL ENDOSCOPY  01/11/2006    Gastritis and duodenitis    Pathology negative for any abnormality    UPPER GASTROINTESTINAL ENDOSCOPY  11/23/2019    Grade 3 esophageal varices and portal hypertensive gastropathy     Social History     Substance and Sexual Activity   Alcohol Use Not Currently     Social History     Substance and Sexual Activity   Drug Use No     Social History     Tobacco Use   Smoking Status Current Every Day Smoker    Packs/day: 0 50    Types: Cigarettes   Smokeless Tobacco Never Used   Tobacco Comment    encouraged smoking cessation     Family History   Problem Relation Age of Onset    Heart disease Father     Heart disease Brother    [facility] Cardiomyopathy Brother     Colon polyps Neg Hx     Colon cancer Neg Hx          Current Outpatient Medications:     albuterol (2 5 mg/3 mL) 0 083 % nebulizer solution    albuterol (PROVENTIL HFA,VENTOLIN HFA) 90 mcg/act inhaler    furosemide (LASIX) 40 mg tablet    pantoprazole (PROTONIX) 40 mg tablet    potassium chloride (K-DUR,KLOR-CON) 20 mEq tablet    propranolol (INDERAL) 20 mg tablet    spironolactone (ALDACTONE) 25 mg tablet  Allergies   Allergen Reactions    Flexeril [Cyclobenzaprine] Hallucinations           Morphine And Related Hives    Naprosyn [Naproxen] GI Intolerance    Ultram [Tramadol] GI Intolerance     Reviewed medications and allergies and updated as indicated    PHYSICAL EXAM:    Blood pressure 120/66, pulse 58, temperature (!) 97 4 °F (36 3 °C), height 6' 1" (1 854 m), weight 68 kg (150 lb)  Body mass index is 19 79 kg/m²  General Appearance: NAD, cooperative, alert  Eyes: Anicteric  ENT:  Normocephalic    Resp:  Clear to auscultation bilaterally; no rales, rhonchi or wheezing; respirations unlabored   CV:  S1 S2, Regular rate and rhythm; no murmur, rub, or gallop  GI:  Soft, non-tender, non-distended; normal bowel sounds; no masses, no organomegaly   Rectal: Deferred  Musculoskeletal: No cyanosis, clubbing or edema  Normal ROM    Skin:  No jaundice, rashes, or lesions   Psych: Normal affect, good eye contact  Neuro: No gross deficits, AAOx3    Lab Results:   Lab Results   Component Value Date    WBC 3 61 (L) 07/06/2020    HGB 8 9 (L) 07/06/2020    HCT 28 5 (L) 07/06/2020    MCV 72 (L) 07/06/2020    PLT 61 (L) 07/06/2020     Lab Results   Component Value Date     08/27/2015    K 2 5 (LL) 07/06/2020    CL 91 (L) 07/06/2020    CO2 30 07/06/2020    ANIONGAP 12 08/27/2015    BUN 7 07/06/2020    CREATININE 1 05 07/06/2020    GLUCOSE 135 08/27/2015    GLUF 105 (H) 01/04/2020    CALCIUM 7 8 (L) 07/06/2020    AST 22 07/06/2020    ALT 13 07/06/2020    ALKPHOS 220 (H) 07/06/2020 PROT 6 9 08/27/2015    BILITOT 0 82 08/27/2015    EGFR 78 07/06/2020     No results found for: IRON, TIBC, FERRITIN  Lab Results   Component Value Date    LIPASE 133 01/03/2020       Radiology Results:   No results found

## 2020-07-08 NOTE — PATIENT INSTRUCTIONS
Blood work next week   Increase spironolactone ( aldactone) to 50 mg daily  Take potassium supplement  Hold Lasix for 2 days and then resume   Lactulose as needed  Repeat EGD 08/2020  Continue nadolol

## 2020-07-09 ENCOUNTER — TELEPHONE (OUTPATIENT)
Dept: GASTROENTEROLOGY | Facility: CLINIC | Age: 60
End: 2020-07-09

## 2020-07-09 NOTE — TELEPHONE ENCOUNTER
Pt left [facility] stating he saw Zetta Belt; was supposed to call in Lactulose; asks for  [phone]    (Pt also said something about paperwork but mssg not clear)

## 2020-07-13 ENCOUNTER — OFFICE VISIT (OUTPATIENT)
Dept: GASTROENTEROLOGY | Facility: CLINIC | Age: 60
End: 2020-07-13
Payer: COMMERCIAL

## 2020-07-13 VITALS
HEART RATE: 70 BPM | HEIGHT: 73 IN | TEMPERATURE: 97.2 F | BODY MASS INDEX: 19.35 KG/M2 | DIASTOLIC BLOOD PRESSURE: 78 MMHG | WEIGHT: 146 LBS | SYSTOLIC BLOOD PRESSURE: 130 MMHG

## 2020-07-13 DIAGNOSIS — R18.8 OTHER ASCITES: Primary | ICD-10-CM

## 2020-07-13 DIAGNOSIS — Z12.11 SCREENING FOR COLON CANCER: ICD-10-CM

## 2020-07-13 DIAGNOSIS — I85.00 ESOPHAGEAL VARICES DETERMINED BY ENDOSCOPY (HCC): ICD-10-CM

## 2020-07-13 DIAGNOSIS — Z86.010 HISTORY OF COLON POLYPS: ICD-10-CM

## 2020-07-13 DIAGNOSIS — K72.90 HEPATIC ENCEPHALOPATHY (HCC): ICD-10-CM

## 2020-07-13 DIAGNOSIS — K59.00 CONSTIPATION, UNSPECIFIED CONSTIPATION TYPE: ICD-10-CM

## 2020-07-13 DIAGNOSIS — K70.31 ALCOHOLIC CIRRHOSIS OF LIVER WITH ASCITES (HCC): ICD-10-CM

## 2020-07-13 PROCEDURE — 99214 OFFICE O/P EST MOD 30 MIN: CPT | Performed by: NURSE PRACTITIONER

## 2020-07-13 NOTE — PATIENT INSTRUCTIONS
Paracentesis reschedule by intervention Radiology  Continue Aldactone 100 mg  Await results of BMP and if creatinine within normal range will add Lasix

## 2020-07-13 NOTE — H&P (VIEW-ONLY)
[facility] - Outpatient Follow-up Note  Jen Cox 63 y o  male MRN: [id number]  Encounter: [id number]    ASSESSMENT AND PLAN:      1  Alcoholic cirrhosis of liver with ascites (HCC)  History of alcoholic cirrhosis  Abstinent from all alcohol for two and half years   Decompensated with ascites,esophageal varices, pancytopenia, and coagulopathy  Last AFP within normal range and hepatic ultrasound without hepatoma      - [address]  surveillance every 6 months to include blood work and hepatic ultrasound  - spironolactone (ALDACTONE) 25 mg tablet; Take 2 tablets (50 mg total) by mouth daily  Dispense: 30 tablet; Refill: 3    2  Ascites  Underwent a paracentesis at the end of last year with 2 L of fluid removed  Ascites was under control on Aldactone 50 mg and Lasix 40 mg, although Lasix has been held for several days due to low potassium level of 2 5  He underwent a BMP today and pending results will resume Lasix  - IR paracentesis; Future  - Continue Aldactone 100 mg daily  - Lasix to be resumed if potassium level acceptable  If potassium remains low will continue him on potassium pills and can possibly restart Lasix    2  Esophageal varices determined by endoscopy [facility])  He has undergone multiple upper endoscopies since February of this year with subsequent banding for varices  Recent EGD performed 06/24/2020 showed multiple medium and circumferential grade 3 varices without bleeding (red ace sign); 5 bands placed      - continue propanolol  - EGD scheduled in 4 weeks    3  Hepatic encephalopathy ([address]    Controlled on lactulose  Titrate dose for 3-4 loose stools daily  6  Constipation, unspecified constipation type  Resolved on lactulose  4  History of colon polyps  5  Screening for colon cancer  Last colonoscopy performed at [facility] on 11/23/19 showed large internal and external hemorrhoids, rectal varices without bleeding and tubular adenoma    A five year recall advised  Followup Appointment: Three months  ______________________________________________________________________    Chief Complaint   Patient presents with    Follow up, having bloating     HPI:     Presents to the office for increased abdominal distention over the past week  Denies any rectal bleeding, melena, abdominal pain, nausea, vomiting, confusion, skin rashes or lesions, arthralgias or myalgias  He believes he has lost some weight, but the amount is unclear  Having 2-3 soft stools daily  Historical Information   Past Medical History:   Diagnosis Date    Cardiac disease     Chronic left-sided headaches     Chronic pain     back and neck    Chronic pain disorder     COPD (chronic obstructive pulmonary disease) (HCC)     History of transfusion     Hypertension      Past Surgical History:   Procedure Laterality Date    BACK SURGERY      CERVICAL FUSION      CHOLECYSTECTOMY      COLONOSCOPY  11/23/2019    Internal external hemorrhoids, nonbleeding rectal varices    CORONARY ANGIOPLASTY WITH STENT PLACEMENT      2006-PTCA/STENT to mid and distal RCA; then 2009 Left-LAD normal, circumflex-normal,Proximal % stnosis, Chronic total occlusion    EGD  01/11/2006    HERNIA REPAIR      JOINT REPLACEMENT Left     knee    KNEE SURGERY      NECK SURGERY      PARACENTESIS      [facility]    UPPER GASTROINTESTINAL ENDOSCOPY  01/11/2006    Gastritis and duodenitis    Pathology negative for any abnormality    UPPER GASTROINTESTINAL ENDOSCOPY  11/23/2019    Grade 3 esophageal varices and portal hypertensive gastropathy     Social History     Substance and Sexual Activity   Alcohol Use Not Currently     Social History     Substance and Sexual Activity   Drug Use No     Social History     Tobacco Use   Smoking Status Current Every Day Smoker    Packs/day: 0 50    Types: Cigarettes   Smokeless Tobacco Never Used   Tobacco Comment    encouraged smoking cessation     Family History   Problem Relation Age of Onset    Heart disease Father     Heart disease Brother     Cardiomyopathy Brother     Colon polyps Neg Hx     Colon cancer Neg Hx          Current Outpatient Medications:     albuterol (2 5 mg/3 mL) 0 083 % nebulizer solution    albuterol (PROVENTIL HFA,VENTOLIN HFA) 90 mcg/act inhaler    furosemide (LASIX) 40 mg tablet    lactulose 20 g/30 mL    pantoprazole (PROTONIX) 40 mg tablet    potassium chloride (K-DUR,KLOR-CON) 20 mEq tablet    propranolol (INDERAL) 20 mg tablet    spironolactone (ALDACTONE) 25 mg tablet  Allergies   Allergen Reactions    Flexeril [Cyclobenzaprine] Hallucinations           Morphine And Related Hives    Naprosyn [Naproxen] GI Intolerance    Ultram [Tramadol] GI Intolerance     Reviewed medications and allergies and updated as indicated    PHYSICAL EXAM:    Blood pressure 130/78, pulse 70, temperature (!) 97 2 °F (36 2 °C), height 6' 1" (1 854 m), weight 66 2 kg (146 lb)  Body mass index is 19 26 kg/m²  General Appearance: NAD, cooperative, alert  Eyes: Anicteric  ENT:  Normocephalic   Neck:  Supple  Resp:  Clear to auscultation bilaterally; no rales, rhonchi or wheezing; respirations unlabored   CV:  S1 S2, Regular rate and rhythm; no murmur, rub, or gallop  GI:  Soft, non-tender, non-distended; normal bowel sounds; no masses, no organomegaly   Rectal: Deferred    Skin:  No jaundice, rashes, or lesions   Heme/Lymph: No palpable cervical lymphadenopathy  Psych: Normal affect, good eye contact  Neuro: No gross deficits, AAOx3    Lab Results:   Lab Results   Component Value Date    WBC 3 61 (L) 07/06/2020    HGB 8 9 (L) 07/06/2020    HCT 28 5 (L) 07/06/2020    MCV 72 (L) 07/06/2020    PLT 61 (L) 07/06/2020     Lab Results   Component Value Date     08/27/2015    K 2 5 (LL) 07/06/2020    CL 91 (L) 07/06/2020    CO2 30 07/06/2020    ANIONGAP 12 08/27/2015    BUN 7 07/06/2020    CREATININE 1 05 07/06/2020    GLUCOSE 135 08/27/2015    GLUF 105 (H) 01/04/2020 CALCIUM 7 8 (L) 07/06/2020    AST 22 07/06/2020    ALT 13 07/06/2020    ALKPHOS 220 (H) 07/06/2020    PROT 6 9 08/27/2015    BILITOT 0 82 08/27/2015    EGFR 78 07/06/2020     No results found for: IRON, TIBC, FERRITIN  Lab Results   Component Value Date    LIPASE 133 01/03/2020       Radiology Results:   No results found

## 2020-07-13 NOTE — PROGRESS NOTES
[facility] - Outpatient Follow-up Note  Jaci Chiu 63 y o  male MRN: [id number]  Encounter: [id number]    ASSESSMENT AND PLAN:      1  Alcoholic cirrhosis of liver with ascites (HCC)  History of alcoholic cirrhosis  Abstinent from all alcohol for two and half years   Decompensated with ascites,esophageal varices, pancytopenia, and coagulopathy  Last AFP within normal range and hepatic ultrasound without hepatoma      - [facility] [address]  surveillance every 6 months to include blood work and hepatic ultrasound  - spironolactone (ALDACTONE) 25 mg tablet; Take 2 tablets (50 mg total) by mouth daily  Dispense: 30 tablet; Refill: 3    2  Ascites  Underwent a paracentesis at the end of last year with 2 L of fluid removed  Ascites was under control on Aldactone 50 mg and Lasix 40 mg, although Lasix has been held for several days due to low potassium level of 2 5  He underwent a BMP today and pending results will resume Lasix  - IR paracentesis; Future  - Continue Aldactone 100 mg daily  - Lasix to be resumed if potassium level acceptable  If potassium remains low will continue him on potassium pills and can possibly restart Lasix    2  Esophageal varices determined by endoscopy [facility])  He has undergone multiple upper endoscopies since February of this year with subsequent banding for varices  Recent EGD performed 06/24/2020 showed multiple medium and circumferential grade 3 varices without bleeding (red ace sign); 5 bands placed      - continue propanolol  - EGD scheduled in 4 weeks    3  Hepatic encephalopathy ([address]    Controlled on lactulose  Titrate dose for 3-4 loose stools daily  6  Constipation, unspecified constipation type  Resolved on lactulose  4  History of colon polyps  5  Screening for colon cancer  Last colonoscopy performed at [facility] on 11/23/19 showed large internal and external hemorrhoids, rectal varices without bleeding and tubular adenoma    A five year recall advised  Followup Appointment: Three months  ______________________________________________________________________    Chief Complaint   Patient presents with    Follow up, having bloating     HPI:     Presents to the office for increased abdominal distention over the past week  Denies any rectal bleeding, melena, abdominal pain, nausea, vomiting, confusion, skin rashes or lesions, arthralgias or myalgias  He believes he has lost some weight, but the amount is unclear  Having 2-3 soft stools daily  Historical Information   Past Medical History:   Diagnosis Date    Cardiac disease     Chronic left-sided headaches     Chronic pain     back and neck    Chronic pain disorder     COPD (chronic obstructive pulmonary disease) (HCC)     History of transfusion     Hypertension      Past Surgical History:   Procedure Laterality Date    BACK SURGERY      CERVICAL FUSION      CHOLECYSTECTOMY      COLONOSCOPY  11/23/2019    Internal external hemorrhoids, nonbleeding rectal varices    CORONARY ANGIOPLASTY WITH STENT PLACEMENT      2006-PTCA/STENT to mid and distal RCA; then 2009 Left-LAD normal, circumflex-normal,Proximal % stnosis, Chronic total occlusion    EGD  01/11/2006    HERNIA REPAIR      JOINT REPLACEMENT Left     knee    KNEE SURGERY      NECK SURGERY      PARACENTESIS      [facility]    UPPER GASTROINTESTINAL ENDOSCOPY  01/11/2006    Gastritis and duodenitis    Pathology negative for any abnormality    UPPER GASTROINTESTINAL ENDOSCOPY  11/23/2019    Grade 3 esophageal varices and portal hypertensive gastropathy     Social History     Substance and Sexual Activity   Alcohol Use Not Currently     Social History     Substance and Sexual Activity   Drug Use No     Social History     Tobacco Use   Smoking Status Current Every Day Smoker    Packs/day: 0 50    Types: Cigarettes   Smokeless Tobacco Never Used   Tobacco Comment    encouraged smoking cessation     Family History   Problem Relation Age of Onset    Heart disease Father     Heart disease Brother     Cardiomyopathy Brother     Colon polyps Neg Hx     Colon cancer Neg Hx          Current Outpatient Medications:     albuterol (2 5 mg/3 mL) 0 083 % nebulizer solution    albuterol (PROVENTIL HFA,VENTOLIN HFA) 90 mcg/act inhaler    furosemide (LASIX) 40 mg tablet    lactulose 20 g/30 mL    pantoprazole (PROTONIX) 40 mg tablet    potassium chloride (K-DUR,KLOR-CON) 20 mEq tablet    propranolol (INDERAL) 20 mg tablet    spironolactone (ALDACTONE) 25 mg tablet  Allergies   Allergen Reactions    Flexeril [Cyclobenzaprine] Hallucinations           Morphine And Related Hives    Naprosyn [Naproxen] GI Intolerance    Ultram [Tramadol] GI Intolerance     Reviewed medications and allergies and updated as indicated    PHYSICAL EXAM:    Blood pressure 130/78, pulse 70, temperature (!) 97 2 °F (36 2 °C), height 6' 1" (1 854 m), weight 66 2 kg (146 lb)  Body mass index is 19 26 kg/m²  General Appearance: NAD, cooperative, alert  Eyes: Anicteric  ENT:  Normocephalic   Neck:  Supple  Resp:  Clear to auscultation bilaterally; no rales, rhonchi or wheezing; respirations unlabored   CV:  S1 S2, Regular rate and rhythm; no murmur, rub, or gallop  GI:  Soft, non-tender, non-distended; normal bowel sounds; no masses, no organomegaly   Rectal: Deferred    Skin:  No jaundice, rashes, or lesions   Heme/Lymph: No palpable cervical lymphadenopathy  Psych: Normal affect, good eye contact  Neuro: No gross deficits, AAOx3    Lab Results:   Lab Results   Component Value Date    WBC 3 61 (L) 07/06/2020    HGB 8 9 (L) 07/06/2020    HCT 28 5 (L) 07/06/2020    MCV 72 (L) 07/06/2020    PLT 61 (L) 07/06/2020     Lab Results   Component Value Date     08/27/2015    K 2 5 (LL) 07/06/2020    CL 91 (L) 07/06/2020    CO2 30 07/06/2020    ANIONGAP 12 08/27/2015    BUN 7 07/06/2020    CREATININE 1 05 07/06/2020    GLUCOSE 135 08/27/2015    GLUF 105 (H) 01/04/2020 CALCIUM 7 8 (L) 07/06/2020    AST 22 07/06/2020    ALT 13 07/06/2020    ALKPHOS 220 (H) 07/06/2020    PROT 6 9 08/27/2015    BILITOT 0 82 08/27/2015    EGFR 78 07/06/2020     No results found for: IRON, TIBC, FERRITIN  Lab Results   Component Value Date    LIPASE 133 01/03/2020       Radiology Results:   No results found

## 2020-07-14 LAB — AFP-TM SERPL-MCNC: 1.6 NG/ML

## 2020-07-15 ENCOUNTER — HOSPITAL ENCOUNTER (OUTPATIENT)
Dept: INTERVENTIONAL RADIOLOGY/VASCULAR | Facility: HOSPITAL | Age: 60
Discharge: HOME | End: 2020-07-15
Admitting: RADIOLOGY
Payer: COMMERCIAL

## 2020-07-15 VITALS
SYSTOLIC BLOOD PRESSURE: 130 MMHG | OXYGEN SATURATION: 99 % | DIASTOLIC BLOOD PRESSURE: 59 MMHG | RESPIRATION RATE: 18 BRPM | HEART RATE: 62 BPM

## 2020-07-15 DIAGNOSIS — R18.8 OTHER ASCITES: ICD-10-CM

## 2020-07-15 PROCEDURE — 49083 ABD PARACENTESIS W/IMAGING: CPT

## 2020-07-15 PROCEDURE — 76705 ECHO EXAM OF ABDOMEN: CPT | Performed by: RADIOLOGY

## 2020-07-17 ENCOUNTER — TELEPHONE (OUTPATIENT)
Dept: GASTROENTEROLOGY | Facility: CLINIC | Age: 60
End: 2020-07-17

## 2020-07-17 DIAGNOSIS — K70.31 ALCOHOLIC CIRRHOSIS OF LIVER WITH ASCITES (HCC): ICD-10-CM

## 2020-07-17 DIAGNOSIS — K72.90 HEPATIC ENCEPHALOPATHY (HCC): ICD-10-CM

## 2020-07-17 DIAGNOSIS — E87.6 HYPOKALEMIA: Primary | ICD-10-CM

## 2020-07-17 NOTE — TELEPHONE ENCOUNTER
Please ask the patient if he had blood work drawn as we need a BMP before I can restart his Lasix    My intention was to increase his Aldactone from  but I would need his blood work 1st

## 2020-07-17 NOTE — TELEPHONE ENCOUNTER
I spoke with patient he states he had labs drawn at [facility] this week  I informed patient we will not be making any changes in diuretics since there was no drainable ascites when he went for para on 7/15/20 and we cannot make med changes without these results  His K+ could be affected  I told him we would be in touch on Monday after we retrieve lab results

## 2020-07-17 NOTE — TELEPHONE ENCOUNTER
Patient called  States he got US and there was no fluid  However today again his abdomen is swollen and hard  Confirmed aldactone and he is take 50 mg daily   (although the rx that was sent was only for 15 days as it was qty 30)    Later in the office note the aldactone is listed at 100 mg daily so I'm not sure what patient should be taking  He is still taking lactulose  Not on any lasix at this time    [phone]

## 2020-07-20 NOTE — TELEPHONE ENCOUNTER
Pt called, he is at the lab and they apparently have no order for any more labs  BMP order placed again to [facility]   Pt aware

## 2020-07-20 NOTE — TELEPHONE ENCOUNTER
Patient left message that he went for the BMP today but still does not know what to do about the abdomen and wants call back

## 2020-07-20 NOTE — TELEPHONE ENCOUNTER
The only available [facility] results are AFP marker  I will call patient to advise he needs to go for the BMP that was ordered

## 2020-07-21 LAB
BUN SERPL-MCNC: 5 MG/DL (ref 7–25)
BUN/CREAT SERPL: 7 (CALC) (ref 6–22)
CALCIUM SERPL-MCNC: 8.1 MG/DL (ref 8.6–10.3)
CHLORIDE SERPL-SCNC: 100 MMOL/L (ref 98–110)
CO2 SERPL-SCNC: 30 MMOL/L (ref 20–32)
CREAT SERPL-MCNC: 0.67 MG/DL (ref 0.7–1.33)
GLUCOSE SERPL-MCNC: 93 MG/DL (ref 65–139)
POTASSIUM SERPL-SCNC: 4.1 MMOL/L (ref 3.5–5.3)
SL AMB EGFR AFRICAN AMERICAN: 123 ML/MIN/1.73M2
SL AMB EGFR NON AFRICAN AMERICAN: 106 ML/MIN/1.73M2
SODIUM SERPL-SCNC: 136 MMOL/L (ref 135–146)

## 2020-07-21 NOTE — TELEPHONE ENCOUNTER
Results given that he can  resume Lasix 40 mg daily but he needs a repeat BMP in 1 week  Script sent  Endoscopy scheduled for next month and perhaps that will explain his feeling of abdominal bloating as there was no ascites noted on ultrasound

## 2020-07-29 ENCOUNTER — CLINICAL SUPPORT (OUTPATIENT)
Dept: GASTROENTEROLOGY | Facility: CLINIC | Age: 60
End: 2020-07-29

## 2020-07-29 VITALS — WEIGHT: 141 LBS | HEIGHT: 73 IN | BODY MASS INDEX: 18.69 KG/M2

## 2020-07-29 DIAGNOSIS — I85.00 ESOPHAGEAL VARICES WITHOUT BLEEDING, UNSPECIFIED ESOPHAGEAL VARICES TYPE (HCC): ICD-10-CM

## 2020-07-29 DIAGNOSIS — I85.00 ESOPHAGEAL VARICES DETERMINED BY ENDOSCOPY (HCC): Primary | ICD-10-CM

## 2020-07-29 RX ORDER — OMEPRAZOLE 20 MG/1
CAPSULE, DELAYED RELEASE ORAL
Qty: 90 CAPSULE | OUTPATIENT
Start: 2020-07-29

## 2020-07-29 NOTE — PROGRESS NOTES
EGD phone prep completed; meds/allergies reviewed; instructions reviewed; pt did not want instructions sent to him  Verbalized understanding of instructions

## 2020-07-31 DIAGNOSIS — R18.8 OTHER ASCITES: ICD-10-CM

## 2020-07-31 RX ORDER — SPIRONOLACTONE 50 MG/1
50 TABLET, FILM COATED ORAL DAILY
Qty: 90 TABLET | Refills: 0 | Status: SHIPPED | OUTPATIENT
Start: 2020-07-31 | End: 2020-10-29

## 2020-07-31 NOTE — PRE-PROCEDURE INSTRUCTIONS
Pre-Surgery Instructions:   Medication Instructions    albuterol (2 5 mg/3 mL) 0 083 % nebulizer solution Patient was instructed by Physician and understands   albuterol (PROVENTIL HFA,VENTOLIN HFA) 90 mcg/act inhaler Patient was instructed by Physician and understands   furosemide (LASIX) 40 mg tablet Patient was instructed by Physician and understands   lactulose 20 g/30 mL Patient was instructed by Physician and understands   pantoprazole (PROTONIX) 40 mg tablet Patient was instructed by Physician and understands   propranolol (INDERAL) 20 mg tablet Patient was instructed by Physician and understands   spironolactone (ALDACTONE) 25 mg tablet Patient was instructed by Physician and understands  Follow prep as per physician  You will receive a call with your time to arrive at the hospital   Secure transportation, you will be having anesthesia

## 2020-08-05 ENCOUNTER — ANESTHESIA (OUTPATIENT)
Dept: GASTROENTEROLOGY | Facility: HOSPITAL | Age: 60
End: 2020-08-05

## 2020-08-05 ENCOUNTER — HOSPITAL ENCOUNTER (OUTPATIENT)
Dept: GASTROENTEROLOGY | Facility: HOSPITAL | Age: 60
Setting detail: OUTPATIENT SURGERY
Discharge: HOME | End: 2020-08-05
Attending: INTERNAL MEDICINE | Admitting: INTERNAL MEDICINE
Payer: MEDICARE

## 2020-08-05 ENCOUNTER — ANESTHESIA EVENT (OUTPATIENT)
Dept: GASTROENTEROLOGY | Facility: HOSPITAL | Age: 60
End: 2020-08-05

## 2020-08-05 VITALS
DIASTOLIC BLOOD PRESSURE: 56 MMHG | TEMPERATURE: 97.6 F | RESPIRATION RATE: 18 BRPM | SYSTOLIC BLOOD PRESSURE: 116 MMHG | OXYGEN SATURATION: 99 % | HEART RATE: 59 BPM

## 2020-08-05 DIAGNOSIS — I85.00 ESOPHAGEAL VARICES DETERMINED BY ENDOSCOPY (HCC): ICD-10-CM

## 2020-08-05 PROCEDURE — 43235 EGD DIAGNOSTIC BRUSH WASH: CPT | Performed by: INTERNAL MEDICINE

## 2020-08-05 RX ORDER — SODIUM CHLORIDE 9 MG/ML
50 INJECTION, SOLUTION INTRAVENOUS CONTINUOUS
Status: DISCONTINUED | OUTPATIENT
Start: 2020-08-05 | End: 2020-08-09 | Stop reason: HOSPADM

## 2020-08-05 RX ORDER — PROPOFOL 10 MG/ML
INJECTION, EMULSION INTRAVENOUS AS NEEDED
Status: DISCONTINUED | OUTPATIENT
Start: 2020-08-05 | End: 2020-08-05

## 2020-08-05 RX ORDER — PROPOFOL 10 MG/ML
INJECTION, EMULSION INTRAVENOUS CONTINUOUS PRN
Status: DISCONTINUED | OUTPATIENT
Start: 2020-08-05 | End: 2020-08-05

## 2020-08-05 RX ADMIN — PROPOFOL 25 MG: 10 INJECTION, EMULSION INTRAVENOUS at 10:04

## 2020-08-05 RX ADMIN — PROPOFOL 25 MG: 10 INJECTION, EMULSION INTRAVENOUS at 10:03

## 2020-08-05 RX ADMIN — PROPOFOL 100 MG: 10 INJECTION, EMULSION INTRAVENOUS at 09:57

## 2020-08-05 RX ADMIN — SODIUM CHLORIDE 50 ML/HR: 0.9 INJECTION, SOLUTION INTRAVENOUS at 09:33

## 2020-08-05 RX ADMIN — PROPOFOL 100 MCG/KG/MIN: 10 INJECTION, EMULSION INTRAVENOUS at 09:59

## 2020-08-05 RX ADMIN — PROPOFOL 25 MG: 10 INJECTION, EMULSION INTRAVENOUS at 09:59

## 2020-08-05 RX ADMIN — PROPOFOL 25 MG: 10 INJECTION, EMULSION INTRAVENOUS at 10:01

## 2020-08-05 NOTE — ANESTHESIA PREPROCEDURE EVALUATION
Review of Systems/Medical History  Patient summary reviewed  No history of anesthetic complications     Cardiovascular  Hypertension , CAD ,    Pulmonary  Smoker cigarette smoker  , COPD ,        GI/Hepatic    Esophageal disease esophageal varices, Liver disease , portal hypertension, alcohol related and cirrhosis, Chronic liver disease,        Negative  ROS        Endo/Other  Negative endo/other ROS      GYN  Negative gynecology ROS          Hematology  Anemia ,     Musculoskeletal    Comment: c spine fusion      Neurology  Negative neurology ROS      Psychology   Anxiety,   Chronic pain,            Physical Exam    Airway    Mallampati score: I  TM Distance: >3 FB  Neck ROM: full     Dental       Cardiovascular  Cardiovascular exam normal    Pulmonary  Pulmonary exam normal Breath sounds clear to auscultation, Decreased breath sounds,     Other Findings        Anesthesia Plan  ASA Score- 3     Anesthesia Type- IV sedation with anesthesia with ASA Monitors  Additional Monitors:   Airway Plan:           Plan Factors-    Patient summary reviewed  Induction- intravenous  Postoperative Plan-     Informed Consent- Anesthetic plan and risks discussed with patient  I personally reviewed this patient with the CRNA  Discussed and agreed on the Anesthesia Plan with the CRNA  Yolanda Epps

## 2020-08-05 NOTE — ANESTHESIA POSTPROCEDURE EVALUATION
Post-Op Assessment Note    CV Status:  Stable  Pain Score: 0    Pain management: adequate     Mental Status:  Arousable, sleepy and somnolent   Hydration Status:  Euvolemic   PONV Controlled:  Controlled   Airway Patency:  Patent      Post Op Vitals Reviewed: Yes      Comments: left on 2 l o2 nc/spo2-100%  Remains [id number]  BP reading 80s d/t cuff upper arm in lld        No complications documented      BP      Temp      Pulse     Resp      SpO2

## 2020-08-05 NOTE — PERIOPERATIVE NURSING NOTE
Received pt asleep from anesthesia post egd  Quickly pt awoken and self removing monitors and attempting to exit stretcher  Pt encouraged to remain in bed d/t vitals and just awakening s/p anesthesia  Pt adamant he's leaving  Pt reminded he cannot drive home, must remain in dept until daughter arrives to take him home  Pt states he's, "going outside to smoke a cigarette and wait for her " Requested pt remain until Dr Herman Bal available to discuss findings  Pt agreeable  Monitors reapplied and vitals continued to be monitored  OR Director and security officers x2 called to bedside to reinforced to pt he is under the influence of anesthesia and unable to drive self home  Pt became irate and aggressive, ripping monitors off and IV out of arm  Pt bleeding and tangled in tubing  Pressure applied by RN to bleeding site  Dr Herman Bal at bedside to review procedure findings  Pt again told he is legally unable to drive self home post anesthesia  Pt demands he is leaving and agreeable to sign AMA form as requested by OR director  Pt accompanied outside of building by security officers x2 and OR director  Upon exiting [address] per OR director,  "pt continued walking towards road and removed phone from pocket calling daughter and telling her not to bother coming  Pt waved and said 'see Luretha Nest later I'm driving ' Then pt headed to parking lot and got into car and left the campus " Security given pt contact information to follow up with police

## 2020-08-05 NOTE — PERIOPERATIVE NURSING NOTE
Pt arrived to hospital at [address] [place] [address] and was checked in for procedure  Pt was asked who his ride home was going to be and he replied, his daughter  Pt was asked for his daughter's phone number for post-procedure and he replied, "I will call her when I am done "  Pt advised that he needed to provide the phone number because he left on his own after last procedure and drove himself home which is not allowed  Pt refused, OR manager was notified and came to speak with the pt  OR manager was given pt's phone upon pt placing call to his daughter  OR manager spoke with pt's daughter on the phone who said she was agreeable to come and pick pt up after procedure  OK to proceed with procedure at this time per Dr Chetan Saleh

## 2020-08-05 NOTE — INTERVAL H&P NOTE
H&P reviewed  After examining the patient I find no changes in the patients condition since the H&P had been written      Vitals:    08/05/20 0925   BP: 124/62   Pulse: 60   Resp: 18   Temp: 97 6 °F (36 4 °C)   SpO2: 98%

## 2020-08-11 ENCOUNTER — HOSPITAL ENCOUNTER (EMERGENCY)
Facility: HOSPITAL | Age: 60
Discharge: HOME | DRG: 871 | End: 2020-08-11
Attending: EMERGENCY MEDICINE | Admitting: EMERGENCY MEDICINE
Payer: MEDICARE

## 2020-08-11 VITALS
TEMPERATURE: 100.1 F | RESPIRATION RATE: 18 BRPM | SYSTOLIC BLOOD PRESSURE: 119 MMHG | HEART RATE: 95 BPM | DIASTOLIC BLOOD PRESSURE: 56 MMHG | OXYGEN SATURATION: 94 %

## 2020-08-11 DIAGNOSIS — R50.9 FEVER: Primary | ICD-10-CM

## 2020-08-11 LAB
ANION GAP SERPL CALCULATED.3IONS-SCNC: 10 MMOL/L (ref 4–13)
BASOPHILS # BLD MANUAL: 0 THOUSAND/UL (ref 0–0.1)
BASOPHILS NFR MAR MANUAL: 0 % (ref 0–1)
BUN SERPL-MCNC: 6 MG/DL (ref 5–25)
CALCIUM SERPL-MCNC: 7.8 MG/DL (ref 8.3–10.1)
CHLORIDE SERPL-SCNC: 97 MMOL/L (ref 100–108)
CO2 SERPL-SCNC: 25 MMOL/L (ref 21–32)
CREAT SERPL-MCNC: 0.85 MG/DL (ref 0.6–1.3)
EOSINOPHIL # BLD MANUAL: 0 THOUSAND/UL (ref 0–0.4)
EOSINOPHIL NFR BLD MANUAL: 0 % (ref 0–6)
ERYTHROCYTE [DISTWIDTH] IN BLOOD BY AUTOMATED COUNT: 19.3 % (ref 11.6–15.1)
GFR SERPL CREATININE-BSD FRML MDRD: 95 ML/MIN/1.73SQ M
GLUCOSE SERPL-MCNC: 91 MG/DL (ref 65–140)
HCT VFR BLD AUTO: 27.3 % (ref 36.5–49.3)
HGB BLD-MCNC: 8.5 G/DL (ref 12–17)
HYPERCHROMIA BLD QL SMEAR: PRESENT
LG PLATELETS BLD QL SMEAR: PRESENT
LYMPHOCYTES # BLD AUTO: 0.28 THOUSAND/UL (ref 0.6–4.47)
LYMPHOCYTES # BLD AUTO: 10 % (ref 14–44)
MCH RBC QN AUTO: 22.5 PG (ref 26.8–34.3)
MCHC RBC AUTO-ENTMCNC: 31.1 G/DL (ref 31.4–37.4)
MCV RBC AUTO: 72 FL (ref 82–98)
MONOCYTES # BLD AUTO: 0.06 THOUSAND/UL (ref 0–1.22)
MONOCYTES NFR BLD: 2 % (ref 4–12)
NEUTROPHILS # BLD MANUAL: 2.49 THOUSAND/UL (ref 1.85–7.62)
NEUTS BAND NFR BLD MANUAL: 4 % (ref 0–8)
NEUTS SEG NFR BLD AUTO: 84 % (ref 43–75)
NRBC BLD AUTO-RTO: 0 /100 WBCS
PLATELET # BLD AUTO: 56 THOUSANDS/UL (ref 149–390)
PLATELET BLD QL SMEAR: ABNORMAL
POIKILOCYTOSIS BLD QL SMEAR: PRESENT
POLYCHROMASIA BLD QL SMEAR: PRESENT
POTASSIUM SERPL-SCNC: 3.3 MMOL/L (ref 3.5–5.3)
RBC # BLD AUTO: 3.77 MILLION/UL (ref 3.88–5.62)
RBC MORPH BLD: PRESENT
SODIUM SERPL-SCNC: 132 MMOL/L (ref 136–145)
TOTAL CELLS COUNTED SPEC: 100
WBC # BLD AUTO: 2.83 THOUSAND/UL (ref 4.31–10.16)

## 2020-08-11 PROCEDURE — 85027 COMPLETE CBC AUTOMATED: CPT | Performed by: PHYSICIAN ASSISTANT

## 2020-08-11 PROCEDURE — 85007 BL SMEAR W/DIFF WBC COUNT: CPT | Performed by: PHYSICIAN ASSISTANT

## 2020-08-11 PROCEDURE — 36415 COLL VENOUS BLD VENIPUNCTURE: CPT | Performed by: PHYSICIAN ASSISTANT

## 2020-08-11 PROCEDURE — 80048 BASIC METABOLIC PNL TOTAL CA: CPT | Performed by: PHYSICIAN ASSISTANT

## 2020-08-11 PROCEDURE — 99283 EMERGENCY DEPT VISIT LOW MDM: CPT

## 2020-08-11 PROCEDURE — 99284 EMERGENCY DEPT VISIT MOD MDM: CPT | Performed by: PHYSICIAN ASSISTANT

## 2020-08-11 PROCEDURE — 87040 BLOOD CULTURE FOR BACTERIA: CPT | Performed by: PHYSICIAN ASSISTANT

## 2020-08-11 RX ORDER — AMOXICILLIN AND CLAVULANATE POTASSIUM 875; 125 MG/1; MG/1
1 TABLET, FILM COATED ORAL EVERY 12 HOURS
Qty: 10 TABLET | Refills: 0 | Status: SHIPPED | OUTPATIENT
Start: 2020-08-11 | End: 2020-08-14 | Stop reason: HOSPADM

## 2020-08-11 NOTE — ED PROVIDER NOTES
History  Chief Complaint   Patient presents with    Fever - 9 weeks to 74 years     patient states he was in the sun for about 4 hours and wife said he had a fever and sent him to the ED  Pt's temp is 100 1 in the ED  52-year-old male with history of COPD, cirrhosis with ascites, and esophageal varices presents emergency department for evaluation of fever  States that he was outdoors working in the heat for 4-5 hours today and developed a fever after working  States that he feels fine and has no symptoms however his wife wanted him evaluated  He specifically denies any chest pain, shortness of breath, coughing, nausea, vomiting, diarrhea, or urinary symptoms  Of note he did have an EGD 6 days ago for assessment of his varices  He states that he has been COVID tested within the past week and was negative at that time  No ill contacts at home    On exam, patient is well-appearing, afebrile, in no immediate distress  Vital signs are unremarkable with the exception of low-grade fever  He appears well-hydrated  Cardiopulmonary exam is benign  Abdomen is soft and nontender with no significant ascites  No peripheral edema    A/P:  Acute fever  May be related to heat exposure although occult bacteremia is a concern given recent EGD  Will check blood cultures, CBC, CMP, and urinalysis  Prior to Admission Medications   Prescriptions Last Dose Informant Patient Reported? Taking?    albuterol (2 5 mg/3 mL) 0 083 % nebulizer solution  Self Yes No   Sig: Take 2 5 mg by nebulization every 6 (six) hours as needed for wheezing or shortness of breath   albuterol (PROVENTIL HFA,VENTOLIN HFA) 90 mcg/act inhaler  Self Yes No   Sig: Inhale 2 puffs as needed for shortness of breath   furosemide (LASIX) 40 mg tablet  Self No No   Sig: Take 1 tablet (40 mg total) by mouth daily   lactulose 20 g/30 mL  Self No No   Sig: Take 30 mL (20 g total) by mouth 3 (three) times a day   pantoprazole (PROTONIX) 40 mg tablet Self No No   Sig: Take 1 tablet (40 mg total) by mouth daily   propranolol (INDERAL) 20 mg tablet  Self No No   Sig: Take 1 tablet (20 mg total) by mouth daily   spironolactone (ALDACTONE) 25 mg tablet  Self No No   Sig: Take 2 tablets (50 mg total) by mouth daily   spironolactone (ALDACTONE) 50 mg tablet   No No   Sig: Take 1 tablet (50 mg total) by mouth daily      Facility-Administered Medications: None       Past Medical History:   Diagnosis Date    Cardiac disease     Chronic left-sided headaches     Chronic pain     back and neck    Chronic pain disorder     COPD [facility]chronic obstructive pulmonary disease) ([facility] [address] )     History of transfusion     Hypertension        Past Surgical History:   Procedure Laterality Date    BACK SURGERY      CERVICAL FUSION      CHOLECYSTECTOMY      COLONOSCOPY  11/23/2019    Internal external hemorrhoids, nonbleeding rectal varices    CORONARY ANGIOPLASTY WITH STENT PLACEMENT      2006-PTCA/STENT to mid and distal RCA; then 2009 Left-LAD normal, circumflex-normal,Proximal % stnosis, Chronic total occlusion    EGD  01/11/2006    HERNIA REPAIR      IR PARACENTESIS  7/15/2020    JOINT REPLACEMENT Left     knee    KNEE SURGERY      NECK SURGERY      PARACENTESIS      [facility]    UPPER GASTROINTESTINAL ENDOSCOPY  01/11/2006    Gastritis and duodenitis  Pathology negative for any abnormality    UPPER GASTROINTESTINAL ENDOSCOPY  11/23/2019    Grade 3 esophageal varices and portal hypertensive gastropathy       Family History   Problem Relation Age of Onset    Heart disease Father     Heart disease Brother     Cardiomyopathy Brother     Colon polyps Neg Hx     Colon cancer Neg Hx      I have reviewed and agree with the history as documented      E-Cigarette/Vaping    E-Cigarette Use Never User      E-Cigarette/Vaping Substances    Nicotine No     THC No     CBD No     Flavoring No     Other No     Unknown No      Social History     Tobacco Use    Smoking status: Current Every Day Smoker     Packs/day: 0 50     Types: Cigarettes    Smokeless tobacco: Never Used    Tobacco comment: encouraged smoking cessation   Substance Use Topics    Alcohol use: Not Currently    Drug use: No       Review of Systems   Constitutional: Positive for fever  Negative for chills and diaphoresis  Eyes: Negative for visual disturbance  Respiratory: Negative for cough and shortness of breath  Cardiovascular: Negative for chest pain and palpitations  Gastrointestinal: Negative for abdominal pain, diarrhea, nausea and vomiting  Genitourinary: Negative for dysuria, flank pain and frequency  Musculoskeletal: Negative for arthralgias and myalgias  Skin: Negative for color change, rash and wound  Allergic/Immunologic: Negative for immunocompromised state  Neurological: Negative for dizziness and light-headedness  Hematological: Does not bruise/bleed easily  Psychiatric/Behavioral: Negative for confusion  The patient is not nervous/anxious  Physical Exam  Physical Exam  Vitals signs reviewed  Constitutional:       General: He is not in acute distress  Appearance: He is well-developed  He is not diaphoretic  HENT:      Head: Normocephalic and atraumatic  Mouth/Throat:      Mouth: Mucous membranes are moist    Eyes:      General: No scleral icterus  Pupils: Pupils are equal, round, and reactive to light  Neck:      Vascular: No JVD  Cardiovascular:      Rate and Rhythm: Normal rate and regular rhythm  Heart sounds: No murmur  No friction rub  No gallop  Pulmonary:      Effort: No respiratory distress  Breath sounds: No wheezing or rales  Abdominal:      General: Bowel sounds are normal  There is no distension  Palpations: There is no mass  Tenderness: There is no abdominal tenderness  Skin:     General: Skin is warm and dry  Capillary Refill: Capillary refill takes less than 2 seconds     Neurological:      Mental Status: He is alert and oriented to person, place, and time     Psychiatric:         Mood and Affect: Mood normal          Vital Signs  ED Triage Vitals [08/11/20 1914]   Temperature Pulse Respirations Blood Pressure SpO2   100 1 °F (37 8 °C) 95 18 119/56 94 %      Temp Source Heart Rate Source Patient Position - Orthostatic VS BP Location FiO2 (%)   Temporal Monitor Sitting Left arm --      Pain Score       --           Vitals:    08/11/20 1914   BP: 119/56   Pulse: 95   Patient Position - Orthostatic VS: Sitting         Visual Acuity      ED Medications  Medications - No data to display    Diagnostic Studies  Results Reviewed     Procedure Component Value Units Date/Time    CBC and differential [[id number]]  (Abnormal) Collected:  08/11/20 1935    Lab Status:  Final result Specimen:  Blood from Arm, Right Updated:  08/11/20 2026     WBC 2 83 Thousand/uL      RBC 3 77 Million/uL      Hemoglobin 8 5 g/dL      Hematocrit 27 3 %      MCV 72 fL      MCH 22 5 pg      MCHC 31 1 g/dL      RDW 19 3 %      Platelets 56 Thousands/uL      nRBC 0 /100 WBCs     Basic metabolic panel [[id number]]  (Abnormal) Collected:  08/11/20 1935    Lab Status:  Final result Specimen:  Blood from Arm, Right Updated:  08/11/20 2003     Sodium 132 mmol/L      Potassium 3 3 mmol/L      Chloride 97 mmol/L      CO2 25 mmol/L      ANION GAP 10 mmol/L      BUN 6 mg/dL      Creatinine 0 85 mg/dL      Glucose 91 mg/dL      Calcium 7 8 mg/dL      eGFR 95 ml/min/1 73sq m     Narrative:       Kj guidelines for Chronic Kidney Disease (CKD):     Stage 1 with normal or high GFR (GFR > 90 mL/min/1 73 square meters)    Stage 2 Mild CKD (GFR = 60-89 mL/min/1 73 square meters)    Stage 3A Moderate CKD (GFR = 45-59 mL/min/1 73 square meters)    Stage 3B Moderate CKD (GFR = 30-44 mL/min/1 73 square meters)    Stage 4 Severe CKD (GFR = 15-29 mL/min/1 73 square meters)    Stage 5 End Stage CKD (GFR <15 mL/min/1 73 square meters)  Note: GFR calculation is accurate only with a steady state creatinine    Blood culture #2 [[id number]] Collected:  08/11/20 1935    Lab Status: In process Specimen:  Blood from Arm, Right Updated:  08/11/20 1943    Blood culture #1 [[id number]] Collected:  08/11/20 1935    Lab Status: In process Specimen:  Blood from Arm, Right Updated:  08/11/20 1943    UA w Reflex to Microscopic w Reflex to Culture [[id number]]     Lab Status:  No result Specimen:  Urine                  No orders to display              Procedures  Procedures         ED Course                                             MDM  Number of Diagnoses or Management Options  Fever: new and requires workup  Diagnosis management comments: Pt elected to leave prior to workup being complete  Overall he appears clinically well, no ascites concerning for SBP  Labs all pending at time pt requested to leave  He is aware that he may be contacted to return if workup reveals significant abnormalities requiring hospitalization  In particular I am concerned for occult bacteremia given recent EGD, pt is provided w/ Rx for abx to cover broad spectrum  Amount and/or Complexity of Data Reviewed  Clinical lab tests: ordered and reviewed  Tests in the radiology section of CPT®: ordered  Tests in the medicine section of CPT®: ordered and reviewed  Review and summarize past medical records: yes          Disposition  Final diagnoses:   Fever     Time reflects when diagnosis was documented in both MDM as applicable and the Disposition within this note     Time User Action Codes Description Comment    8/11/2020  7:50 PM Christoph Lloyd Add [R50 9] Fever       ED Disposition     ED Disposition Condition Date/Time Comment    Discharge Stable Tue Aug 11, 2020  7:50 PM Michelle Islas discharge to home/self care  Follow-up Information     Follow up With Specialties Details Why Contact Info Additional [address] [facility] [address] Emergency Department Emergency Medicine  If symptoms worsen [address] [facility] [address] [address]  [phone]  ED, [address]          Discharge Medication List as of 8/11/2020  7:51 PM      START taking these medications    Details   amoxicillin-clavulanate (AUGMENTIN) 875-125 mg per tablet Take 1 tablet by mouth every 12 (twelve) hours for 5 days, Starting Tue 8/11/2020, Until Sun 8/16/2020, Normal         CONTINUE these medications which have NOT CHANGED    Details   albuterol (2 5 mg/3 mL) 0 083 % nebulizer solution Take 2 5 mg by nebulization every 6 (six) hours as needed for wheezing or shortness of breath, Historical Med      albuterol (PROVENTIL HFA,VENTOLIN HFA) 90 mcg/act inhaler Inhale 2 puffs as needed for shortness of breath, Historical Med      furosemide (LASIX) 40 mg tablet Take 1 tablet (40 mg total) by mouth daily, Starting Wed 6/17/2020, Normal      lactulose 20 g/30 mL Take 30 mL (20 g total) by mouth 3 (three) times a day, Starting Wed 7/8/2020, Until Fri 8/7/2020, Normal      pantoprazole (PROTONIX) 40 mg tablet Take 1 tablet (40 mg total) by mouth daily, Starting Wed 6/17/2020, Normal      propranolol (INDERAL) 20 mg tablet Take 1 tablet (20 mg total) by mouth daily, Starting Thu 5/7/2020, Normal      !! spironolactone (ALDACTONE) 25 mg tablet Take 2 tablets (50 mg total) by mouth daily, Starting Wed 7/8/2020, Until Sun 9/6/2020, Normal      !! spironolactone (ALDACTONE) 50 mg tablet Take 1 tablet (50 mg total) by mouth daily, Starting Fri 7/31/2020, Until Thu 10/29/2020, Normal       !! - Potential duplicate medications found  Please discuss with provider  No discharge procedures on file      PDMP Review       Value Time User    PDMP Reviewed  Yes 1/4/2020 11:59 AM Zunilda Jama MD          ED Provider  Electronically Signed by           Eloy Corral PA-C  08/11/20 2043

## 2020-08-11 NOTE — DISCHARGE INSTRUCTIONS
There is a possibility we may call you to return for admission if certain blood tests return positive   Please take the antibiotics I have a prescribed as a precaution

## 2020-08-12 ENCOUNTER — APPOINTMENT (INPATIENT)
Dept: RADIOLOGY | Facility: HOSPITAL | Age: 60
DRG: 871 | End: 2020-08-12
Payer: MEDICARE

## 2020-08-12 ENCOUNTER — HOSPITAL ENCOUNTER (INPATIENT)
Facility: HOSPITAL | Age: 60
LOS: 2 days | Discharge: LEFT BEFORE DISCHARGE | DRG: 871 | End: 2020-08-14
Attending: EMERGENCY MEDICINE | Admitting: INTERNAL MEDICINE
Payer: MEDICARE

## 2020-08-12 ENCOUNTER — APPOINTMENT (EMERGENCY)
Dept: RADIOLOGY | Facility: HOSPITAL | Age: 60
DRG: 871 | End: 2020-08-12
Payer: MEDICARE

## 2020-08-12 DIAGNOSIS — R65.21 SEPTIC SHOCK (HCC): Primary | ICD-10-CM

## 2020-08-12 DIAGNOSIS — R13.10 DYSPHAGIA, UNSPECIFIED TYPE: ICD-10-CM

## 2020-08-12 DIAGNOSIS — A41.9 SEPTIC SHOCK (HCC): Primary | ICD-10-CM

## 2020-08-12 DIAGNOSIS — R57.9 SHOCK (HCC): ICD-10-CM

## 2020-08-12 PROBLEM — E87.2 LACTIC ACID ACIDOSIS: Status: ACTIVE | Noted: 2020-08-12

## 2020-08-12 LAB
ABO GROUP BLD: NORMAL
ACANTHOCYTES BLD QL SMEAR: PRESENT
ALBUMIN SERPL BCP-MCNC: 2.5 G/DL (ref 3.5–5)
ALBUMIN SERPL BCP-MCNC: 3.1 G/DL (ref 3.5–5)
ALP SERPL-CCNC: 177 U/L (ref 46–116)
ALP SERPL-CCNC: 246 U/L (ref 46–116)
ALT SERPL W P-5'-P-CCNC: 21 U/L (ref 12–78)
ALT SERPL W P-5'-P-CCNC: 21 U/L (ref 12–78)
AMMONIA PLAS-SCNC: <10 UMOL/L (ref 11–35)
ANION GAP SERPL CALCULATED.3IONS-SCNC: 12 MMOL/L (ref 4–13)
ANION GAP SERPL CALCULATED.3IONS-SCNC: 13 MMOL/L (ref 4–13)
ANISOCYTOSIS BLD QL SMEAR: PRESENT
APTT PPP: 37 SECONDS (ref 23–37)
AST SERPL W P-5'-P-CCNC: 53 U/L (ref 5–45)
AST SERPL W P-5'-P-CCNC: 68 U/L (ref 5–45)
BACTERIA UR QL AUTO: ABNORMAL /HPF
BASOPHILS # BLD MANUAL: 0 THOUSAND/UL (ref 0–0.1)
BASOPHILS NFR MAR MANUAL: 0 % (ref 0–1)
BILIRUB SERPL-MCNC: 2.4 MG/DL (ref 0.2–1)
BILIRUB SERPL-MCNC: 2.5 MG/DL (ref 0.2–1)
BILIRUB UR QL STRIP: NEGATIVE
BLD GP AB SCN SERPL QL: NEGATIVE
BUN SERPL-MCNC: 13 MG/DL (ref 5–25)
BUN SERPL-MCNC: 13 MG/DL (ref 5–25)
BURR CELLS BLD QL SMEAR: PRESENT
CALCIUM SERPL-MCNC: 6.6 MG/DL (ref 8.3–10.1)
CALCIUM SERPL-MCNC: 8 MG/DL (ref 8.3–10.1)
CHLORIDE SERPL-SCNC: 96 MMOL/L (ref 100–108)
CHLORIDE SERPL-SCNC: 98 MMOL/L (ref 100–108)
CK SERPL-CCNC: 79 U/L (ref 39–308)
CLARITY UR: CLEAR
CO2 SERPL-SCNC: 21 MMOL/L (ref 21–32)
CO2 SERPL-SCNC: 21 MMOL/L (ref 21–32)
COLOR UR: YELLOW
CREAT SERPL-MCNC: 1.29 MG/DL (ref 0.6–1.3)
CREAT SERPL-MCNC: 1.35 MG/DL (ref 0.6–1.3)
EOSINOPHIL # BLD MANUAL: 0 THOUSAND/UL (ref 0–0.4)
EOSINOPHIL NFR BLD MANUAL: 0 % (ref 0–6)
ERYTHROCYTE [DISTWIDTH] IN BLOOD BY AUTOMATED COUNT: 19.7 % (ref 11.6–15.1)
ETHANOL SERPL-MCNC: <3 MG/DL (ref 0–3)
GFR SERPL CREATININE-BSD FRML MDRD: 57 ML/MIN/1.73SQ M
GFR SERPL CREATININE-BSD FRML MDRD: 60 ML/MIN/1.73SQ M
GLUCOSE SERPL-MCNC: 80 MG/DL (ref 65–140)
GLUCOSE SERPL-MCNC: 88 MG/DL (ref 65–140)
GLUCOSE UR STRIP-MCNC: NEGATIVE MG/DL
HCT VFR BLD AUTO: 22.2 % (ref 36.5–49.3)
HGB BLD-MCNC: 6.7 G/DL (ref 12–17)
HGB BLD-MCNC: 8 G/DL (ref 12–17)
HGB UR QL STRIP.AUTO: ABNORMAL
HYALINE CASTS #/AREA URNS LPF: ABNORMAL /LPF
HYPERCHROMIA BLD QL SMEAR: PRESENT
INR PPP: 1.76 (ref 0.84–1.19)
KETONES UR STRIP-MCNC: NEGATIVE MG/DL
LACTATE SERPL-SCNC: 3.5 MMOL/L (ref 0.5–2)
LACTATE SERPL-SCNC: 4.6 MMOL/L (ref 0.5–2)
LACTATE SERPL-SCNC: 5.1 MMOL/L (ref 0.5–2)
LEUKOCYTE ESTERASE UR QL STRIP: NEGATIVE
LG PLATELETS BLD QL SMEAR: PRESENT
LYMPHOCYTES # BLD AUTO: 0.07 THOUSAND/UL (ref 0.6–4.47)
LYMPHOCYTES # BLD AUTO: 3 % (ref 14–44)
MCH RBC QN AUTO: 22.4 PG (ref 26.8–34.3)
MCHC RBC AUTO-ENTMCNC: 30.2 G/DL (ref 31.4–37.4)
MCV RBC AUTO: 74 FL (ref 82–98)
MONOCYTES # BLD AUTO: 0 THOUSAND/UL (ref 0–1.22)
MONOCYTES NFR BLD: 0 % (ref 4–12)
MUCOUS THREADS UR QL AUTO: ABNORMAL
NEUTROPHILS # BLD MANUAL: 2.19 THOUSAND/UL (ref 1.85–7.62)
NEUTS BAND NFR BLD MANUAL: 12 % (ref 0–8)
NEUTS SEG NFR BLD AUTO: 85 % (ref 43–75)
NITRITE UR QL STRIP: NEGATIVE
NON-SQ EPI CELLS URNS QL MICRO: ABNORMAL /HPF
NRBC BLD AUTO-RTO: 0 /100 WBCS
OVALOCYTES BLD QL SMEAR: PRESENT
PH UR STRIP.AUTO: 5.5 [PH]
PLATELET # BLD AUTO: 41 THOUSANDS/UL (ref 149–390)
PLATELET BLD QL SMEAR: ABNORMAL
POIKILOCYTOSIS BLD QL SMEAR: PRESENT
POLYCHROMASIA BLD QL SMEAR: PRESENT
POTASSIUM SERPL-SCNC: 2.9 MMOL/L (ref 3.5–5.3)
POTASSIUM SERPL-SCNC: 3 MMOL/L (ref 3.5–5.3)
PROCALCITONIN SERPL-MCNC: 82.74 NG/ML
PROT SERPL-MCNC: 5.3 G/DL (ref 6.4–8.2)
PROT SERPL-MCNC: 6.4 G/DL (ref 6.4–8.2)
PROT UR STRIP-MCNC: NEGATIVE MG/DL
PROTHROMBIN TIME: 20.3 SECONDS (ref 11.6–14.5)
RBC # BLD AUTO: 2.99 MILLION/UL (ref 3.88–5.62)
RBC #/AREA URNS AUTO: ABNORMAL /HPF
RBC MORPH BLD: PRESENT
RH BLD: POSITIVE
SARS-COV-2 RNA RESP QL NAA+PROBE: NEGATIVE
SODIUM SERPL-SCNC: 130 MMOL/L (ref 136–145)
SODIUM SERPL-SCNC: 131 MMOL/L (ref 136–145)
SP GR UR STRIP.AUTO: <=1.005 (ref 1–1.03)
SPECIMEN EXPIRATION DATE: NORMAL
TOTAL CELLS COUNTED SPEC: 100
TOXIC GRANULES BLD QL SMEAR: PRESENT
TROPONIN I SERPL-MCNC: 0.03 NG/ML
UROBILINOGEN UR QL STRIP.AUTO: 1 E.U./DL
WBC # BLD AUTO: 2.26 THOUSAND/UL (ref 4.31–10.16)
WBC #/AREA URNS AUTO: ABNORMAL /HPF

## 2020-08-12 PROCEDURE — 71045 X-RAY EXAM CHEST 1 VIEW: CPT

## 2020-08-12 PROCEDURE — 96375 TX/PRO/DX INJ NEW DRUG ADDON: CPT

## 2020-08-12 PROCEDURE — 82140 ASSAY OF AMMONIA: CPT | Performed by: NURSE PRACTITIONER

## 2020-08-12 PROCEDURE — 80320 DRUG SCREEN QUANTALCOHOLS: CPT | Performed by: NURSE PRACTITIONER

## 2020-08-12 PROCEDURE — 93005 ELECTROCARDIOGRAM TRACING: CPT

## 2020-08-12 PROCEDURE — 85610 PROTHROMBIN TIME: CPT | Performed by: EMERGENCY MEDICINE

## 2020-08-12 PROCEDURE — 71046 X-RAY EXAM CHEST 2 VIEWS: CPT

## 2020-08-12 PROCEDURE — 85027 COMPLETE CBC AUTOMATED: CPT | Performed by: EMERGENCY MEDICINE

## 2020-08-12 PROCEDURE — 96361 HYDRATE IV INFUSION ADD-ON: CPT

## 2020-08-12 PROCEDURE — 81001 URINALYSIS AUTO W/SCOPE: CPT | Performed by: NURSE PRACTITIONER

## 2020-08-12 PROCEDURE — 83605 ASSAY OF LACTIC ACID: CPT | Performed by: EMERGENCY MEDICINE

## 2020-08-12 PROCEDURE — 99291 CRITICAL CARE FIRST HOUR: CPT

## 2020-08-12 PROCEDURE — 87040 BLOOD CULTURE FOR BACTERIA: CPT | Performed by: EMERGENCY MEDICINE

## 2020-08-12 PROCEDURE — 87106 FUNGI IDENTIFICATION YEAST: CPT | Performed by: EMERGENCY MEDICINE

## 2020-08-12 PROCEDURE — 99291 CRITICAL CARE FIRST HOUR: CPT | Performed by: NURSE PRACTITIONER

## 2020-08-12 PROCEDURE — 83605 ASSAY OF LACTIC ACID: CPT | Performed by: NURSE PRACTITIONER

## 2020-08-12 PROCEDURE — 80053 COMPREHEN METABOLIC PANEL: CPT | Performed by: NURSE PRACTITIONER

## 2020-08-12 PROCEDURE — 99291 CRITICAL CARE FIRST HOUR: CPT | Performed by: EMERGENCY MEDICINE

## 2020-08-12 PROCEDURE — 85018 HEMOGLOBIN: CPT | Performed by: NURSE PRACTITIONER

## 2020-08-12 PROCEDURE — 87186 SC STD MICRODIL/AGAR DIL: CPT

## 2020-08-12 PROCEDURE — 36415 COLL VENOUS BLD VENIPUNCTURE: CPT | Performed by: EMERGENCY MEDICINE

## 2020-08-12 PROCEDURE — 87635 SARS-COV-2 COVID-19 AMP PRB: CPT | Performed by: EMERGENCY MEDICINE

## 2020-08-12 PROCEDURE — 80053 COMPREHEN METABOLIC PANEL: CPT | Performed by: EMERGENCY MEDICINE

## 2020-08-12 PROCEDURE — 84145 PROCALCITONIN (PCT): CPT | Performed by: EMERGENCY MEDICINE

## 2020-08-12 PROCEDURE — 86923 COMPATIBILITY TEST ELECTRIC: CPT

## 2020-08-12 PROCEDURE — 86901 BLOOD TYPING SEROLOGIC RH(D): CPT | Performed by: EMERGENCY MEDICINE

## 2020-08-12 PROCEDURE — 85730 THROMBOPLASTIN TIME PARTIAL: CPT | Performed by: EMERGENCY MEDICINE

## 2020-08-12 PROCEDURE — 94664 DEMO&/EVAL PT USE INHALER: CPT

## 2020-08-12 PROCEDURE — 94640 AIRWAY INHALATION TREATMENT: CPT

## 2020-08-12 PROCEDURE — 02HV33Z INSERTION OF INFUSION DEVICE INTO SUPERIOR VENA CAVA, PERCUTANEOUS APPROACH: ICD-10-PCS | Performed by: INTERNAL MEDICINE

## 2020-08-12 PROCEDURE — 85007 BL SMEAR W/DIFF WBC COUNT: CPT | Performed by: EMERGENCY MEDICINE

## 2020-08-12 PROCEDURE — 86900 BLOOD TYPING SEROLOGIC ABO: CPT | Performed by: EMERGENCY MEDICINE

## 2020-08-12 PROCEDURE — 82550 ASSAY OF CK (CPK): CPT | Performed by: EMERGENCY MEDICINE

## 2020-08-12 PROCEDURE — 94760 N-INVAS EAR/PLS OXIMETRY 1: CPT

## 2020-08-12 PROCEDURE — 36556 INSERT NON-TUNNEL CV CATH: CPT | Performed by: NURSE PRACTITIONER

## 2020-08-12 PROCEDURE — 96365 THER/PROPH/DIAG IV INF INIT: CPT

## 2020-08-12 PROCEDURE — 84484 ASSAY OF TROPONIN QUANT: CPT | Performed by: EMERGENCY MEDICINE

## 2020-08-12 PROCEDURE — 86850 RBC ANTIBODY SCREEN: CPT | Performed by: EMERGENCY MEDICINE

## 2020-08-12 RX ORDER — LACTULOSE 20 G/30ML
20 SOLUTION ORAL 3 TIMES DAILY
Status: DISCONTINUED | OUTPATIENT
Start: 2020-08-12 | End: 2020-08-14 | Stop reason: HOSPADM

## 2020-08-12 RX ORDER — CEFTRIAXONE 1 G/50ML
1000 INJECTION, SOLUTION INTRAVENOUS ONCE
Status: COMPLETED | OUTPATIENT
Start: 2020-08-12 | End: 2020-08-12

## 2020-08-12 RX ORDER — POTASSIUM CHLORIDE 29.8 MG/ML
40 INJECTION INTRAVENOUS ONCE
Status: COMPLETED | OUTPATIENT
Start: 2020-08-12 | End: 2020-08-13

## 2020-08-12 RX ORDER — SODIUM CHLORIDE, SODIUM GLUCONATE, SODIUM ACETATE, POTASSIUM CHLORIDE, MAGNESIUM CHLORIDE, SODIUM PHOSPHATE, DIBASIC, AND POTASSIUM PHOSPHATE .53; .5; .37; .037; .03; .012; .00082 G/100ML; G/100ML; G/100ML; G/100ML; G/100ML; G/100ML; G/100ML
1000 INJECTION, SOLUTION INTRAVENOUS ONCE
Status: COMPLETED | OUTPATIENT
Start: 2020-08-12 | End: 2020-08-12

## 2020-08-12 RX ORDER — LEVALBUTEROL 1.25 MG/.5ML
1.25 SOLUTION, CONCENTRATE RESPIRATORY (INHALATION) EVERY 6 HOURS PRN
Status: DISCONTINUED | OUTPATIENT
Start: 2020-08-12 | End: 2020-08-14 | Stop reason: HOSPADM

## 2020-08-12 RX ORDER — PANTOPRAZOLE SODIUM 40 MG/1
40 INJECTION, POWDER, FOR SOLUTION INTRAVENOUS
Status: DISCONTINUED | OUTPATIENT
Start: 2020-08-13 | End: 2020-08-13

## 2020-08-12 RX ORDER — POTASSIUM CHLORIDE 20MEQ/15ML
40 LIQUID (ML) ORAL
Status: DISCONTINUED | OUTPATIENT
Start: 2020-08-12 | End: 2020-08-12

## 2020-08-12 RX ORDER — NICOTINE 21 MG/24HR
14 PATCH, TRANSDERMAL 24 HOURS TRANSDERMAL DAILY
Status: DISCONTINUED | OUTPATIENT
Start: 2020-08-12 | End: 2020-08-14 | Stop reason: HOSPADM

## 2020-08-12 RX ORDER — POTASSIUM CHLORIDE 20MEQ/15ML
40 LIQUID (ML) ORAL ONCE
Status: COMPLETED | OUTPATIENT
Start: 2020-08-12 | End: 2020-08-12

## 2020-08-12 RX ORDER — SODIUM CHLORIDE, SODIUM GLUCONATE, SODIUM ACETATE, POTASSIUM CHLORIDE, MAGNESIUM CHLORIDE, SODIUM PHOSPHATE, DIBASIC, AND POTASSIUM PHOSPHATE .53; .5; .37; .037; .03; .012; .00082 G/100ML; G/100ML; G/100ML; G/100ML; G/100ML; G/100ML; G/100ML
75 INJECTION, SOLUTION INTRAVENOUS CONTINUOUS
Status: DISCONTINUED | OUTPATIENT
Start: 2020-08-12 | End: 2020-08-14 | Stop reason: HOSPADM

## 2020-08-12 RX ORDER — ALBUTEROL SULFATE 2.5 MG/3ML
2.5 SOLUTION RESPIRATORY (INHALATION) EVERY 6 HOURS PRN
Status: DISCONTINUED | OUTPATIENT
Start: 2020-08-12 | End: 2020-08-12

## 2020-08-12 RX ADMIN — SODIUM CHLORIDE, SODIUM GLUCONATE, SODIUM ACETATE, POTASSIUM CHLORIDE AND MAGNESIUM CHLORIDE 125 ML/HR: 526; 502; 368; 37; 30 INJECTION, SOLUTION INTRAVENOUS at 20:19

## 2020-08-12 RX ADMIN — SODIUM CHLORIDE 1000 ML: 0.9 INJECTION, SOLUTION INTRAVENOUS at 16:48

## 2020-08-12 RX ADMIN — IPRATROPIUM BROMIDE 0.5 MG: 0.5 SOLUTION RESPIRATORY (INHALATION) at 20:55

## 2020-08-12 RX ADMIN — POTASSIUM CHLORIDE 40 MEQ: 29.8 INJECTION, SOLUTION INTRAVENOUS at 22:57

## 2020-08-12 RX ADMIN — POTASSIUM CHLORIDE 40 MEQ: 20 SOLUTION ORAL at 20:30

## 2020-08-12 RX ADMIN — SODIUM CHLORIDE, SODIUM GLUCONATE, SODIUM ACETATE, POTASSIUM CHLORIDE, MAGNESIUM CHLORIDE, SODIUM PHOSPHATE, DIBASIC, AND POTASSIUM PHOSPHATE 1000 ML: .53; .5; .37; .037; .03; .012; .00082 INJECTION, SOLUTION INTRAVENOUS at 17:22

## 2020-08-12 RX ADMIN — SODIUM CHLORIDE 1000 ML: 0.9 INJECTION, SOLUTION INTRAVENOUS at 16:20

## 2020-08-12 RX ADMIN — SODIUM CHLORIDE, SODIUM GLUCONATE, SODIUM ACETATE, POTASSIUM CHLORIDE, MAGNESIUM CHLORIDE, SODIUM PHOSPHATE, DIBASIC, AND POTASSIUM PHOSPHATE 1000 ML: .53; .5; .37; .037; .03; .012; .00082 INJECTION, SOLUTION INTRAVENOUS at 20:19

## 2020-08-12 RX ADMIN — CEFTRIAXONE 1000 MG: 1 INJECTION, SOLUTION INTRAVENOUS at 17:26

## 2020-08-12 RX ADMIN — LEVALBUTEROL HYDROCHLORIDE 1.25 MG: 1.25 SOLUTION, CONCENTRATE RESPIRATORY (INHALATION) at 20:55

## 2020-08-12 RX ADMIN — POTASSIUM CHLORIDE 40 MEQ: 20 SOLUTION ORAL at 23:08

## 2020-08-12 RX ADMIN — NOREPINEPHRINE BITARTRATE 2 MCG/MIN: 1 INJECTION, SOLUTION, CONCENTRATE INTRAVENOUS at 17:47

## 2020-08-12 NOTE — ED PROVIDER NOTES
History  Chief Complaint   Patient presents with    Chills     "I'm thirsty, I got the chills and I'm tired"  Pt state has been going on since yesterday, was seen here for similar  64-year-old male presents for evaluation of generalized fatigue and fever that started yesterday  Patient was evaluated yesterday for the same complaint but he wanted to leave so was discharged  He denies any specific associated complaints including cough, sore throat, headache, abdominal pain  Patient states he was outdoors all day in very high heat and feels dehydrated  Reports minimal urine output  Prior to Admission Medications   Prescriptions Last Dose Informant Patient Reported? Taking?    albuterol (2 5 mg/3 mL) 0 083 % nebulizer solution  Self Yes No   Sig: Take 2 5 mg by nebulization every 6 (six) hours as needed for wheezing or shortness of breath   albuterol (PROVENTIL HFA,VENTOLIN HFA) 90 mcg/act inhaler  Self Yes No   Sig: Inhale 2 puffs as needed for shortness of breath   amoxicillin-clavulanate (AUGMENTIN) 875-125 mg per tablet Not Taking at Unknown time  No No   Sig: Take 1 tablet by mouth every 12 (twelve) hours for 5 days   Patient not taking: Reported on 8/12/2020   furosemide (LASIX) 40 mg tablet 8/12/2020 at Unknown time Self No Yes   Sig: Take 1 tablet (40 mg total) by mouth daily   lactulose 20 g/30 mL 8/12/2020 at Unknown time Self No Yes   Sig: Take 30 mL (20 g total) by mouth 3 (three) times a day   pantoprazole (PROTONIX) 40 mg tablet 8/12/2020 at Unknown time Self No Yes   Sig: Take 1 tablet (40 mg total) by mouth daily   propranolol (INDERAL) 20 mg tablet 8/12/2020 at Unknown time Self No Yes   Sig: Take 1 tablet (20 mg total) by mouth daily   spironolactone (ALDACTONE) 25 mg tablet 8/12/2020 at Unknown time Self No Yes   Sig: Take 2 tablets (50 mg total) by mouth daily   spironolactone (ALDACTONE) 50 mg tablet 8/12/2020 at Unknown time  No Yes   Sig: Take 1 tablet (50 mg total) by mouth daily      Facility-Administered Medications: None       Past Medical History:   Diagnosis Date    Cardiac disease     Chronic left-sided headaches     Chronic pain     back and neck    Chronic pain disorder     COPD (chronic obstructive pulmonary disease) (HCC)     History of transfusion     Hypertension        Past Surgical History:   Procedure Laterality Date    BACK SURGERY      CERVICAL FUSION      CHOLECYSTECTOMY      COLONOSCOPY  11/23/2019    Internal external hemorrhoids, nonbleeding rectal varices    CORONARY ANGIOPLASTY WITH STENT PLACEMENT      2006-PTCA/STENT to mid and distal RCA; then 2009 Left-LAD normal, circumflex-normal,Proximal % stnosis, Chronic total occlusion    EGD  01/11/2006    HERNIA REPAIR      IR PARACENTESIS  7/15/2020    JOINT REPLACEMENT Left     knee    KNEE SURGERY      NECK SURGERY      [facility]IS      [facility]    UPPER GASTROINTESTINAL ENDOSCOPY  01/11/2006    Gastritis and duodenitis  Pathology negative for any abnormality    UPPER GASTROINTESTINAL ENDOSCOPY  11/23/2019    Grade 3 esophageal varices and portal hypertensive gastropathy       Family History   Problem Relation Age of Onset    Heart disease Father     Heart disease Brother     Cardiomyopathy Brother     Colon polyps Neg Hx     Colon cancer Neg Hx      I have reviewed and agree with the history as documented  E-Cigarette/Vaping    E-Cigarette Use Never User      E-Cigarette/Vaping Substances    Nicotine No     THC No     CBD No     Flavoring No     Other No     Unknown No      Social History     Tobacco Use    Smoking status: Current Every Day Smoker     Packs/day: 0 50     Types: Cigarettes    Smokeless tobacco: Never Used    Tobacco comment: encouraged smoking cessation   Substance Use Topics    Alcohol use: Not Currently    Drug use: No       Review of Systems   Constitutional: Positive for chills, fatigue and fever  Negative for diaphoresis     HENT: Negative for congestion and rhinorrhea  Eyes: Negative for pain and visual disturbance  Respiratory: Negative for cough, shortness of breath and wheezing  Cardiovascular: Negative for chest pain and leg swelling  Gastrointestinal: Negative for abdominal pain, diarrhea, nausea and vomiting  Genitourinary: Positive for difficulty urinating  Negative for dysuria, frequency and urgency  Musculoskeletal: Negative for back pain and neck pain  Skin: Negative for color change and rash  Neurological: Negative for syncope, numbness and headaches  All other systems reviewed and are negative  Physical Exam  Physical Exam  Vitals signs and nursing note reviewed  Constitutional:       Appearance: He is well-developed  HENT:      Head: Normocephalic and atraumatic  Eyes:      Conjunctiva/sclera: Conjunctivae normal    Neck:      Musculoskeletal: Normal range of motion and neck supple  Cardiovascular:      Rate and Rhythm: Normal rate and regular rhythm  Pulmonary:      Effort: Pulmonary effort is normal  No respiratory distress  Abdominal:      Palpations: Abdomen is soft  Tenderness: There is no abdominal tenderness  Musculoskeletal: Normal range of motion  General: No tenderness  Skin:     General: Skin is warm  Findings: No erythema  Neurological:      Mental Status: He is alert and oriented to person, place, and time     Psychiatric:         Behavior: Behavior normal          Vital Signs  ED Triage Vitals [08/12/20 1551]   Temperature Pulse Respirations Blood Pressure SpO2   (!) 100 6 °F (38 1 °C) 92 18 99/50 98 %      Temp Source Heart Rate Source Patient Position - Orthostatic VS BP Location FiO2 (%)   Tympanic Monitor Sitting Right arm --      Pain Score       --           Vitals:    08/12/20 1801 08/12/20 1812 08/12/20 1821 08/12/20 1829   BP: (!) 76/47 (!) 77/50 102/54 90/50   Pulse: 94 73  72   Patient Position - Orthostatic VS: Lying Lying  Lying         Visual Acuity      ED Medications  Medications   norepinephrine (LEVOPHED) 4 mg (STANDARD CONCENTRATION) IV in sodium chloride 0 9% 250 mL (10 mcg/min Intravenous Rate/Dose Change 8/12/20 1814)   sodium chloride 0 9 % bolus 1,000 mL (0 mL Intravenous Stopped 8/12/20 1726)   sodium chloride 0 9 % bolus 1,000 mL (0 mL Intravenous Stopped 8/12/20 1730)   multi-electrolyte (PLASMALYTE-A/ISOLYTE-S PH 7 4) IV solution 1,000 mL (1,000 mL Intravenous New Bag 8/12/20 1722)   cefTRIAXone (ROCEPHIN) IVPB (premix) 1,000 mg 50 mL (0 mg Intravenous Stopped 8/12/20 1756)       Diagnostic Studies  Results Reviewed     Procedure Component Value Units Date/Time    Novel Coronavirus Terrance FLOREZ [facility] [[id number]]  (Normal) Collected:  08/12/20 1619    Lab Status:  Final result Specimen:  Nares from Nose Updated:  08/12/20 1736     SARS-CoV-2 Negative    Narrative: The specimen collection materials, transport medium, and/or testing methodology utilized in the production of these test results have been proven to be reliable in a limited validation with an abbreviated program under the Emergency Utilization Authorization provided by the FDA  Testing reported as "Presumptive positive" will be confirmed with secondary testing with a reference laboratory to ensure result accuracy  Clinical caution and judgement should be used with the interpretation of these results with consideration of the clinical impression and other laboratory testing  Testing reported as "Positive" or "Negative" has been proven to be accurate according to standard laboratory validation requirements  All testing is performed with control materials showing appropriate reactivity at standard intervals        CBC and differential [[id number]]  (Abnormal) Collected:  08/12/20 1617    Lab Status:  Final result Specimen:  Blood from Arm, Right Updated:  08/12/20 1734     WBC 2 26 Thousand/uL      RBC 2 99 Million/uL      Hemoglobin 6 7 g/dL      Hematocrit 22 2 %      MCV 74 fL      MCH 22 4 pg      MCHC 30 2 g/dL      RDW 19 7 %      Platelets 41 Thousands/uL      nRBC 0 /100 WBCs     Lactic Acid [[id number]]  (Abnormal) Collected:  08/12/20 1617    Lab Status:  Final result Specimen:  Blood from Arm, Right Updated:  08/12/20 1655     LACTIC ACID 5 1 mmol/L     Narrative:       Result may be elevated if tourniquet was used during collection      Lactic acid 2 Hours [[id number]]     Lab Status:  No result Specimen:  Blood     Troponin I [[id number]]  (Normal) Collected:  08/12/20 1617    Lab Status:  Final result Specimen:  Blood from Arm, Right Updated:  08/12/20 1652     Troponin I 0 03 ng/mL     Comprehensive metabolic panel [[id number]]  (Abnormal) Collected:  08/12/20 1619    Lab Status:  Final result Specimen:  Blood from Arm, Right Updated:  08/12/20 1649     Sodium 130 mmol/L      Potassium 3 0 mmol/L      Chloride 96 mmol/L      CO2 21 mmol/L      ANION GAP 13 mmol/L      BUN 13 mg/dL      Creatinine 1 29 mg/dL      Glucose 80 mg/dL      Calcium 8 0 mg/dL      AST 53 U/L      ALT 21 U/L      Alkaline Phosphatase 246 U/L      Total Protein 6 4 g/dL      Albumin 3 1 g/dL      Total Bilirubin 2 40 mg/dL      eGFR 60 ml/min/1 73sq m     Narrative:       [place] guidelines for Chronic Kidney Disease (CKD):     Stage 1 with normal or high GFR (GFR > 90 mL/min/1 73 square meters)    Stage 2 Mild CKD (GFR = 60-89 mL/min/1 73 square meters)    Stage 3A Moderate CKD (GFR = 45-59 mL/min/1 73 square meters)    Stage 3B Moderate CKD (GFR = 30-44 mL/min/1 73 square meters)    Stage 4 Severe CKD (GFR = 15-29 mL/min/1 73 square meters)    Stage 5 End Stage CKD (GFR <15 mL/min/1 73 square meters)  Note: GFR calculation is accurate only with a steady state creatinine    CK (with reflex to MB) [[id number]]  (Normal) Collected:  08/12/20 1619    Lab Status:  Final result Specimen:  Blood from Arm, Right Updated:  08/12/20 1649     Total CK 79 U/L     Protime-INR [[id number]]  (Abnormal) Collected:  08/12/20 1618    Lab Status:  Final result Specimen:  Blood from Arm, Right Updated:  08/12/20 1647     Protime 20 3 seconds      INR 1 76    APTT [[id number]]  (Normal) Collected:  08/12/20 1618    Lab Status:  Final result Specimen:  Blood from Arm, Right Updated:  08/12/20 1647     PTT 37 seconds     Procalcitonin [[id number]] Collected:  08/12/20 1618    Lab Status: In process Specimen:  Blood from Arm, Right Updated:  08/12/20 1630    Blood culture #1 [[id number]] Collected:  08/12/20 1618    Lab Status: In process Specimen:  Blood from Arm, Right Updated:  08/12/20 1629    Blood culture #2 [[id number]] Collected:  08/12/20 1617    Lab Status: In process Specimen:  Blood from Arm, Left Updated:  08/12/20 1629    UA w Reflex to Microscopic w Reflex to Culture [[id number]]     Lab Status:  No result Specimen:  Urine                  XR chest 2 views   Final Result by Dylan Graves MD (08/12 1640)      No acute cardiopulmonary disease  Workstation performed: [id number]                    Procedures  CriticalCare Time  Performed by: Brittney Trotter DO  Authorized by:  Brittney Trotter DO     Critical care provider statement:     Critical care time (minutes):  45    Critical care time was exclusive of:  Separately billable procedures and treating other patients and teaching time    Critical care was necessary to treat or prevent imminent or life-threatening deterioration of the following conditions:  Cardiac failure, circulatory failure, sepsis and dehydration    Critical care was time spent personally by me on the following activities:  Blood draw for specimens, obtaining history from patient or surrogate, development of treatment plan with patient or surrogate, discussions with consultants, discussions with primary provider, evaluation of patient's response to treatment, examination of patient, ordering and performing treatments and interventions, ordering and review of laboratory studies, ordering and review of radiographic studies, re-evaluation of patient's condition and review of old charts             ED Course  ED Course as of Aug 12 1837   Wed Aug 12, 2020   1618 Procedure Note: EKG  Date/Time: 08/12/20 4:18 PM   Performed by: Christina Briggs  Authorized by: Christina Briggs  ECG interpreted by me, the ED Provider: yes   The EKG demonstrates:  Rate 82  Rhythm Sinus with PVC  QTc 464  No ST elevations/depressions            US AUDIT      Most Recent Value   Initial Alcohol Screen: US AUDIT-C    1  How often do you have a drink containing alcohol?  0 Filed at: 08/12/2020 1550   2  How many drinks containing alcohol do you have on a typical day you are drinking? 0 Filed at: 08/12/2020 1555   3a  Male UNDER 65: How often do you have five or more drinks on one occasion? 0 Filed at: 08/12/2020 155   3b  FEMALE Any Age, or MALE 65+: How often do you have 4 or more drinks on one occassion? 0 Filed at: 08/12/2020 1555   Audit-C Score  0 Filed at: 08/12/2020 1555                  AUSTIN/DAST-10      Most Recent Value   How many times in the past year have you    Used an illegal drug or used a prescription medication for non-medical reasons? Never Filed at: 08/12/2020 1555                                MDM  Number of Diagnoses or Management Options  Septic shock [facility]):   Diagnosis management comments: 64-year-old male with complex past medical history presenting with fever and fatigue  Blood cultures were drawn yesterday but has not resulted yet  Will obtain sepsis evaluation including repeat blood cultures, lactic acid, labs  Administer fluids and continue to reassess  Patient is a blood pressure continue to drop despite 3 L of crystalloid  Patient has not received packed red blood cells yet although I do not suspect this to be the cause of his hypotension  Peripheral Levophed initiated    Discussed case with Critical Care and will be admitted to ICU         Disposition  Final diagnoses:   Septic shock ([facility] [address] )     Time reflects when diagnosis was documented in both MDM as applicable and the Disposition within this note     Time User Action Codes Description Comment    8/12/2020  5:55 PM Robin Wagoner Agapito [A41 9,  R65 21] Septic shock [facility])       ED Disposition     ED Disposition Condition Date/Time Comment    Admit Stable Wed Aug 12, 2020  5:55 PM Case was discussed with ICU and the patient's admission status was agreed to be Admission Status: inpatient status to the service of Dr Prieto Ran   Follow-up Information    None         Patient's Medications   Discharge Prescriptions    No medications on file     No discharge procedures on file      PDMP Review       Value Time User    PDMP Reviewed  Yes 1/4/2020 11:59 AM Hai Bardales MD          ED Provider  Electronically Signed by           Ynes Ly DO  08/12/20 3918

## 2020-08-12 NOTE — PROGRESS NOTES
Patient brought to 80 with ER RN charla Montalvo with Sonu Martin 42  Odell text Radha OLIVEIRA as patient has no orders  Patient restless and irritable, not allowing nurses to assess or settled  Patient alert and oriented to self, place, and time

## 2020-08-12 NOTE — ASSESSMENT & PLAN NOTE
In the setting hypovolemia blood loss versus sepsis  Versus heat stroke  SBP 85/53, low grade temp 100 6,   Patient seen in ED for same yesterday and received IV fluids-reports 2 days of working outside in the heat (Temp 90's)  Fluid resuscitated with 3 Liters of NSS and was started on peripheral levophed ( patient currently refusing a central line)  Received another liter NSS and levophed titrated for MAP > 65  CXR-NAD   BC x 2 pending  Received 1 dose Ceftriaxone  Ua pending-will monitor off antibitioics

## 2020-08-12 NOTE — ED NOTES
Pt states he has not peed in 4 or 5 hours  Pt states he has been taking his medications as directed, but has not started any antibiotics  Pt walks in with steady gait       Albert Nieves RN  08/12/20 9335

## 2020-08-12 NOTE — ED NOTES
Per Dr Ortiz Thayer, second bolus of sodium chloride   9% started      [facility], [facility]  08/12/20 9462

## 2020-08-12 NOTE — ED NOTES
Called ICU to give report, was told AP's are doing sign offs and Radha will be down to evaluate before patient goes to floor        [place], [address] [facility]  08/12/20 3018

## 2020-08-13 ENCOUNTER — APPOINTMENT (INPATIENT)
Dept: RADIOLOGY | Facility: HOSPITAL | Age: 60
DRG: 871 | End: 2020-08-13
Payer: MEDICARE

## 2020-08-13 ENCOUNTER — APPOINTMENT (INPATIENT)
Dept: INTERVENTIONAL RADIOLOGY/VASCULAR | Facility: HOSPITAL | Age: 60
DRG: 871 | End: 2020-08-13
Payer: MEDICARE

## 2020-08-13 PROBLEM — A41.9 SEPTIC SHOCK (HCC): Status: ACTIVE | Noted: 2020-08-12

## 2020-08-13 PROBLEM — D61.818 PANCYTOPENIA (HCC): Status: ACTIVE | Noted: 2020-08-13

## 2020-08-13 PROBLEM — R65.21 SEPTIC SHOCK (HCC): Status: ACTIVE | Noted: 2020-08-12

## 2020-08-13 PROBLEM — E87.1 HYPONATREMIA: Status: ACTIVE | Noted: 2020-08-13

## 2020-08-13 PROBLEM — N17.9 AKI (ACUTE KIDNEY INJURY) (HCC): Status: ACTIVE | Noted: 2020-08-13

## 2020-08-13 PROBLEM — R13.19 ESOPHAGEAL DYSPHAGIA: Status: ACTIVE | Noted: 2020-02-10

## 2020-08-13 PROBLEM — F19.11 HISTORY OF SUBSTANCE ABUSE (HCC): Status: ACTIVE | Noted: 2020-08-13

## 2020-08-13 PROBLEM — E87.6 HYPOKALEMIA: Status: RESOLVED | Noted: 2020-01-03 | Resolved: 2020-08-13

## 2020-08-13 PROBLEM — R33.9 URINARY RETENTION: Status: ACTIVE | Noted: 2020-08-13

## 2020-08-13 LAB
ALBUMIN SERPL BCP-MCNC: 2.6 G/DL (ref 3.5–5)
ALP SERPL-CCNC: 161 U/L (ref 46–116)
ALT SERPL W P-5'-P-CCNC: 29 U/L (ref 12–78)
ANION GAP SERPL CALCULATED.3IONS-SCNC: 15 MMOL/L (ref 4–13)
AST SERPL W P-5'-P-CCNC: 61 U/L (ref 5–45)
ATRIAL RATE: 82 BPM
BILIRUB SERPL-MCNC: 2.5 MG/DL (ref 0.2–1)
BUN SERPL-MCNC: 14 MG/DL (ref 5–25)
CALCIUM SERPL-MCNC: 6.8 MG/DL (ref 8.3–10.1)
CHLORIDE SERPL-SCNC: 103 MMOL/L (ref 100–108)
CO2 SERPL-SCNC: 19 MMOL/L (ref 21–32)
CREAT SERPL-MCNC: 1.29 MG/DL (ref 0.6–1.3)
ERYTHROCYTE [DISTWIDTH] IN BLOOD BY AUTOMATED COUNT: 19.8 % (ref 11.6–15.1)
GFR SERPL CREATININE-BSD FRML MDRD: 60 ML/MIN/1.73SQ M
GLUCOSE SERPL-MCNC: 95 MG/DL (ref 65–140)
HCT VFR BLD AUTO: 25.8 % (ref 36.5–49.3)
HGB BLD-MCNC: 8.2 G/DL (ref 12–17)
LACTATE SERPL-SCNC: 3.9 MMOL/L (ref 0.5–2)
MAGNESIUM SERPL-MCNC: 1.2 MG/DL (ref 1.6–2.6)
MCH RBC QN AUTO: 22.9 PG (ref 26.8–34.3)
MCHC RBC AUTO-ENTMCNC: 31.8 G/DL (ref 31.4–37.4)
MCV RBC AUTO: 72 FL (ref 82–98)
P AXIS: 40 DEGREES
PHOSPHATE SERPL-MCNC: 2.9 MG/DL (ref 2.7–4.5)
PLATELET # BLD AUTO: 49 THOUSANDS/UL (ref 149–390)
POTASSIUM SERPL-SCNC: 4 MMOL/L (ref 3.5–5.3)
PR INTERVAL: 170 MS
PROCALCITONIN SERPL-MCNC: 160.4 NG/ML
PROT SERPL-MCNC: 5.6 G/DL (ref 6.4–8.2)
QRS AXIS: 92 DEGREES
QRSD INTERVAL: 90 MS
QT INTERVAL: 398 MS
QTC INTERVAL: 464 MS
RBC # BLD AUTO: 3.58 MILLION/UL (ref 3.88–5.62)
SODIUM SERPL-SCNC: 137 MMOL/L (ref 136–145)
T WAVE AXIS: -46 DEGREES
VENTRICULAR RATE: 82 BPM
WBC # BLD AUTO: 14.82 THOUSAND/UL (ref 4.31–10.16)

## 2020-08-13 PROCEDURE — 99233 SBSQ HOSP IP/OBS HIGH 50: CPT | Performed by: INTERNAL MEDICINE

## 2020-08-13 PROCEDURE — C9113 INJ PANTOPRAZOLE SODIUM, VIA: HCPCS | Performed by: NURSE PRACTITIONER

## 2020-08-13 PROCEDURE — 84145 PROCALCITONIN (PCT): CPT | Performed by: EMERGENCY MEDICINE

## 2020-08-13 PROCEDURE — 80053 COMPREHEN METABOLIC PANEL: CPT | Performed by: NURSE PRACTITIONER

## 2020-08-13 PROCEDURE — 83605 ASSAY OF LACTIC ACID: CPT | Performed by: NURSE PRACTITIONER

## 2020-08-13 PROCEDURE — 0T9B70Z DRAINAGE OF BLADDER WITH DRAINAGE DEVICE, VIA NATURAL OR ARTIFICIAL OPENING: ICD-10-PCS | Performed by: INTERNAL MEDICINE

## 2020-08-13 PROCEDURE — 71045 X-RAY EXAM CHEST 1 VIEW: CPT

## 2020-08-13 PROCEDURE — 93010 ELECTROCARDIOGRAM REPORT: CPT | Performed by: INTERNAL MEDICINE

## 2020-08-13 PROCEDURE — 84100 ASSAY OF PHOSPHORUS: CPT | Performed by: NURSE PRACTITIONER

## 2020-08-13 PROCEDURE — 87081 CULTURE SCREEN ONLY: CPT | Performed by: INTERNAL MEDICINE

## 2020-08-13 PROCEDURE — 85027 COMPLETE CBC AUTOMATED: CPT | Performed by: NURSE PRACTITIONER

## 2020-08-13 PROCEDURE — 83735 ASSAY OF MAGNESIUM: CPT | Performed by: NURSE PRACTITIONER

## 2020-08-13 PROCEDURE — 76937 US GUIDE VASCULAR ACCESS: CPT

## 2020-08-13 RX ORDER — MAGNESIUM SULFATE HEPTAHYDRATE 40 MG/ML
2 INJECTION, SOLUTION INTRAVENOUS ONCE
Status: COMPLETED | OUTPATIENT
Start: 2020-08-13 | End: 2020-08-13

## 2020-08-13 RX ORDER — LORAZEPAM 2 MG/ML
1 INJECTION INTRAMUSCULAR ONCE
Status: COMPLETED | OUTPATIENT
Start: 2020-08-13 | End: 2020-08-13

## 2020-08-13 RX ORDER — CEFTRIAXONE 2 G/50ML
2000 INJECTION, SOLUTION INTRAVENOUS EVERY 24 HOURS
Status: DISCONTINUED | OUTPATIENT
Start: 2020-08-13 | End: 2020-08-14

## 2020-08-13 RX ORDER — LORAZEPAM 2 MG/ML
1 INJECTION INTRAMUSCULAR EVERY 4 HOURS PRN
Status: DISCONTINUED | OUTPATIENT
Start: 2020-08-13 | End: 2020-08-14 | Stop reason: HOSPADM

## 2020-08-13 RX ORDER — PANTOPRAZOLE SODIUM 40 MG/1
40 TABLET, DELAYED RELEASE ORAL DAILY
Status: DISCONTINUED | OUTPATIENT
Start: 2020-08-13 | End: 2020-08-14 | Stop reason: HOSPADM

## 2020-08-13 RX ADMIN — SODIUM CHLORIDE, SODIUM GLUCONATE, SODIUM ACETATE, POTASSIUM CHLORIDE AND MAGNESIUM CHLORIDE 125 ML/HR: 526; 502; 368; 37; 30 INJECTION, SOLUTION INTRAVENOUS at 04:05

## 2020-08-13 RX ADMIN — PANTOPRAZOLE SODIUM 40 MG: 40 INJECTION, POWDER, FOR SOLUTION INTRAVENOUS at 08:01

## 2020-08-13 RX ADMIN — CEFTRIAXONE 2000 MG: 2 INJECTION, SOLUTION INTRAVENOUS at 08:01

## 2020-08-13 RX ADMIN — SODIUM CHLORIDE, SODIUM GLUCONATE, SODIUM ACETATE, POTASSIUM CHLORIDE AND MAGNESIUM CHLORIDE 125 ML/HR: 526; 502; 368; 37; 30 INJECTION, SOLUTION INTRAVENOUS at 11:55

## 2020-08-13 RX ADMIN — LACTULOSE 20 G: 10 SOLUTION ORAL at 08:01

## 2020-08-13 RX ADMIN — METRONIDAZOLE 500 MG: 500 INJECTION, SOLUTION INTRAVENOUS at 16:24

## 2020-08-13 RX ADMIN — NOREPINEPHRINE BITARTRATE 8 MCG/MIN: 1 INJECTION, SOLUTION, CONCENTRATE INTRAVENOUS at 03:46

## 2020-08-13 RX ADMIN — SODIUM CHLORIDE, SODIUM GLUCONATE, SODIUM ACETATE, POTASSIUM CHLORIDE AND MAGNESIUM CHLORIDE 75 ML/HR: 526; 502; 368; 37; 30 INJECTION, SOLUTION INTRAVENOUS at 20:37

## 2020-08-13 RX ADMIN — METRONIDAZOLE 500 MG: 500 INJECTION, SOLUTION INTRAVENOUS at 08:01

## 2020-08-13 RX ADMIN — LORAZEPAM 1 MG: 2 INJECTION INTRAMUSCULAR; INTRAVENOUS at 17:05

## 2020-08-13 RX ADMIN — LORAZEPAM 1 MG: 2 INJECTION INTRAMUSCULAR; INTRAVENOUS at 23:14

## 2020-08-13 RX ADMIN — METRONIDAZOLE 500 MG: 500 INJECTION, SOLUTION INTRAVENOUS at 23:14

## 2020-08-13 RX ADMIN — MAGNESIUM SULFATE IN WATER 2 G: 40 INJECTION, SOLUTION INTRAVENOUS at 06:18

## 2020-08-13 RX ADMIN — LORAZEPAM 1 MG: 2 INJECTION, SOLUTION INTRAMUSCULAR; INTRAVENOUS at 13:37

## 2020-08-13 NOTE — SOCIAL WORK
LOS: 1 day  Pt is not a documented bundle  Pt is not 30 day readmission  Unplanned readmission is 31 and yellow  Met with Pt  Pt presents AA&Ox3  Discussed role of , discharge planning, identifying help at home and discharge preference  Pt reports trying to rest at time of visit  Pt reports he lives with his wife and kids in [facility]  Pt reports he is independent with adls and ambulation  Pt reports he has crutches and nebulizer  Pt reports he uses [facility] in [place], has prescription plan and is able to afford medications  Pt denies hx of VNA and SNF  Pt reports he was at [facility] in 2019 for drug rehab  Pt reports he does not have POA or living will and declines information  Pt reports his wife will help him at home if needed  Pt denies of hx VNA and SNF  Pt reports his wife will transport him home and declines discharge services  CM to follow

## 2020-08-13 NOTE — ASSESSMENT & PLAN NOTE
Last EGD 8/5/2020 by Dr Batsheva stark  Grade I non bleeding Esophageal varices noted in lower third of esophagus-was not banded 2/2 extensive scarring  Patient without hematemesis  Hold home inderal and lasix in setting of shock

## 2020-08-13 NOTE — ASSESSMENT & PLAN NOTE
Hx of decompensated cirrohosis with ascites, esophageal varices, pancytopenia, and coagulopathy  amonia level <10  INR 1 76  Plt 41  Continue home lactulose  Hold home spironolactone 2/2 shock  Has mild ascites  GI consult

## 2020-08-13 NOTE — ASSESSMENT & PLAN NOTE
Last EGD 8/5/2020 by Dr Domitila Mitchell  Grade I non bleeding Esophageal varices noted in lower third of esophagus-was not banded 2/2 extensive scarring  Patient without hematemesis  Hold home Propanolol in setting of shock

## 2020-08-13 NOTE — ASSESSMENT & PLAN NOTE
In the setting of shock   LA 5 1-fluid resuscitated with 4 L NSS  Lactate trend 5 1, 4 6, 3 5, 3 9  Most likely unable to clear due to liver cirrhosis, stop trending

## 2020-08-13 NOTE — ASSESSMENT & PLAN NOTE
In the setting of shock   LA 5 1-fluid resuscitated with 4 L NSS  Repeat LA pending  Continue to trend

## 2020-08-13 NOTE — ASSESSMENT & PLAN NOTE
Secondary to GIB - pt with history of esophageal varies and LGIB in setting of cirrhosis   Hgb 6 5 in the ED , however repeat 8 0  Baseline appears to be around 8 5  No signs of active bleeding-denied hematemesis, melenic stools  Questionable vomiting at home per family  Type and screened for 1 Unit PRBC   If repeat Hgb less than  7 will transfuse   Consult GI   Continue to trend

## 2020-08-13 NOTE — PROGRESS NOTES
Progress Note Amena Shi 1960, 62 y o  male MRN: [id number]    Unit/Bed#: -01 Encounter: [id number]    Primary Care Provider: Vince Wei   Date and time admitted to hospital: 8/12/2020  3:49 PM        * Shock [facility])  Assessment & Plan  In the setting acute blood loss vs sepsis vs heat induced hypovolemia  In the ED SBP 85/53, low grade temp 100 6  1 dose Ceftriaxone in the ED  CXR-NAD   BC x 2 pending  UA - negative  Received total 4 5 L IVF  Levophed initiated for refractory hypotension  Goal MAP > 65  ABX previously on hold however WBC increased from 2 - 14, will start Ceftriaxone and Flagyl for possible SBP         Lactic acid acidosis  Assessment & Plan  In the setting of shock   LA 5 1-fluid resuscitated with 4 L NSS  Lactate trend 5 1, 4 6, 3 5, 3 9  Most likely unable to clear due to liver cirrhosis, stop trending      Acute blood loss anemia  Assessment & Plan  Secondary to GIB - pt with history of esophageal varies and LGIB in setting of cirrhosis   Hgb 6 5 in the ED , however repeat 8 0  Baseline appears to be around 8 5  No signs of active bleeding-denied hematemesis, melenic stools  Questionable vomiting at home per family  Type and screened for 1 Unit PRBC   If repeat Hgb less than  7 will transfuse   Consult GI   Continue to trend    COPD (chronic obstructive pulmonary disease) ([facility] [address] )  Assessment & Plan  No acute exacerbation - pt denied increase sputum, purulence, and SOB  Albuterol nebs prn      Alcoholic cirrhosis (HCC)  Assessment & Plan  Hx of decompensated cirrohosis with ascites, esophageal varices, pancytopenia, and coagulopathy  amonia level <10  INR 1 76  Plt 41  Continue home lactulose  Hold home spironolactone 2/2 shock  Has mild ascites  GI consult - consider diagnostic paracentesis for suspected SBP     Esophageal varices determined by endoscopy [facility])  Assessment & Plan  Last EGD 8/5/2020 by Dr Batsheva stark  Grade I non bleeding Esophageal varices noted in lower third of esophagus-was not banded 2/2 extensive scarring  Patient without hematemesis  Hold home inderal and lasix in setting of shock      Elevated LFTs  Assessment & Plan  In the setting of shock superimposed on cirrhosis  Slightly improved since admission  Continue to trend      Gastroesophageal reflux disease  Assessment & Plan  Cont home protonix - IV for now     Urinary retention  Assessment & Plan  · Pt unable to void after admission  · Bladder scan >450 cc  · Dickson inserted   · Consider starting Flomax, pt is not on BPH meds as outpatient    MIGDALIA (acute kidney injury) ([facility] [address] )  Assessment & Plan  · Secondary to hypovolemia and shock  · Baseline Cr 0 85, on admission Cr 1 29, peak Cr 1 35  · S/p 4 5 L IVF  · Levophed initiated with goal MAP >65  · Avoid nephrotoxic agents   · Strict I/O  · Cont to trend BMP    Hyponatremia  Assessment & Plan  · Secondary to hypovolemia  · On admission Na 130  · S/p 4 5 L IVF  · Na now 137  · Cont to trend BMP     Pancytopenia (HCC)  Assessment & Plan  · Secondary to cirrhosis  · WBC baseline 2  · Currently with leukocytosis in setting of possible sepsis  · Hgb baseline 8 5  · Transfuse if <7 or active bleeding   · Plt baseline 60  · Transfuse if <20 or active bleeding     Tobacco dependence  Assessment & Plan  Smokes 15 cigarettes/day  Nicotine patch  Smoking cessation       ----------------------------------------------------------------------------------------  HPI/24hr events: Pt admitted yesterday for lactic acidosis and shock due to unclear etiology  Pt received apx 4 5 L of IVF and cont to have refractory hypotension and was started on Levophed  A central line was placed and Levo requirements have been between 12-5 mcg  Pt had urinary retention and a dickson was inserted  Despite persistent hypotension requiring pressors, pt remained HD stable without signs of ongoing shock such as tachycardia and low perfusion        Disposition: Continue Critical Care   Code Status: Level 1 - Full Code  ---------------------------------------------------------------------------------------  SUBJECTIVE  "Just let me sleep"  Pt denied SOB, CP, cough, N/V  Review of Systems  Review of systems was reviewed and negative unless stated above in HPI/24-hour events   ---------------------------------------------------------------------------------------  OBJECTIVE    Vitals   Vitals:    20 0549 20 0600 20 0610 20 0700   BP:  112/57 124/66 95/50   BP Location:       Pulse:  72 74 65   Resp:  (!) 33 (!) 41 (!) 29   Temp:  99 7 °F (37 6 °C) 99 7 °F (37 6 °C) 99 7 °F (37 6 °C)   TempSrc:       SpO2:  98% 99% 97%   Weight: 65 7 kg (144 lb 13 5 oz)      Height:         Temp (24hrs), Av 1 °F (37 8 °C), Min:98 4 °F (36 9 °C), Max:100 9 °F (38 3 °C)  Current: Temperature: 99 7 °F (37 6 °C)          Respiratory:  SpO2: SpO2: 97 %, SpO2 Device: O2 Device: Nasal cannula  Nasal Cannula O2 Flow Rate (L/min): 2 L/min    Invasive/non-invasive ventilation settings   Respiratory    Lab Data (Last 4 hours)    None         O2/Vent Data (Last 4 hours)    None                Physical Exam  Vitals signs reviewed  Constitutional:       Appearance: He is well-developed  He is ill-appearing  Cardiovascular:      Rate and Rhythm: Normal rate and regular rhythm  Pulses: Normal pulses  Heart sounds: No murmur  No friction rub  No gallop  Pulmonary:      Effort: Pulmonary effort is normal  No respiratory distress  Breath sounds: No wheezing, rhonchi or rales  Abdominal:      General: Bowel sounds are normal  There is distension  Palpations: Abdomen is soft  Tenderness: There is no abdominal tenderness  Skin:     General: Skin is warm and dry  Capillary Refill: Capillary refill takes less than 2 seconds  Coloration: Skin is jaundiced  Neurological:      General: No focal deficit present        Mental Status: He is alert, oriented to person, place, and time and easily aroused  Laboratory and Diagnostics:  Results from last 7 days   Lab Units 08/13/20  0525 08/12/20  1956 08/12/20  1617 08/11/20  1935   WBC Thousand/uL 14 82*  --  2 26* 2 83*   HEMOGLOBIN g/dL 8 2* 8 0* 6 7* 8 5*   HEMATOCRIT % 25 8*  --  22 2* 27 3*   PLATELETS Thousands/uL 49*  --  41* 56*   BANDS PCT %  --   --  12* 4   MONO PCT %  --   --  0* 2*     Results from last 7 days   Lab Units 08/13/20  0518 08/12/20 1927 08/12/20  1619 08/11/20  1935   SODIUM mmol/L 137 131* 130* 132*   POTASSIUM mmol/L 4 0 2 9* 3 0* 3 3*   CHLORIDE mmol/L 103 98* 96* 97*   CO2 mmol/L 19* 21 21 25   ANION GAP mmol/L 15* 12 13 10   BUN mg/dL 14 13 13 6   CREATININE mg/dL 1 29 1 35* 1 29 0 85   CALCIUM mg/dL 6 8* 6 6* 8 0* 7 8*   GLUCOSE RANDOM mg/dL 95 88 80 91   ALT U/L 29 21 21  --    AST U/L 61* 68* 53*  --    ALK PHOS U/L 161* 177* 246*  --    ALBUMIN g/dL 2 6* 2 5* 3 1*  --    TOTAL BILIRUBIN mg/dL 2 50* 2 50* 2 40*  --      Results from last 7 days   Lab Units 08/13/20 0518   MAGNESIUM mg/dL 1 2*   PHOSPHORUS mg/dL 2 9      Results from last 7 days   Lab Units 08/12/20  1618   INR  1 76*   PTT seconds 37      Results from last 7 days   Lab Units 08/12/20  1617   TROPONIN I ng/mL 0 03     Results from last 7 days   Lab Units 08/13/20  0524 08/12/20  2243 08/12/20 1927 08/12/20  1617   LACTIC ACID mmol/L 3 9* 3 5* 4 6* 5 1*     ABG:    VBG:    Results from last 7 days   Lab Units 08/12/20  1618   PROCALCITONIN ng/ml 82 74*       Micro  Results from last 7 days   Lab Units 08/12/20  1618 08/12/20  1617 08/11/20  1935   BLOOD CULTURE  Received in Microbiology Lab  Culture in Progress  Received in Microbiology Lab  Culture in Progress  No Growth at 24 hrs  No Growth at 24 hrs  EKG: NSR HR 70's  Imaging: I have personally reviewed pertinent reports  Intake and Output  I/O       08/11 0701 - 08/12 0700 08/12 0701 - 08/13 0700 08/13 0701 - 08/14 0700    P  O   480     I V  (mL/kg)  3616 8 (55 1)     IV Piggyback  1950     Total Intake(mL/kg)  6046 8 (92)     Urine (mL/kg/hr)  2125     Stool  0     Total Output  2125     Net  +3921 8            Unmeasured Stool Occurrence  1 x           Height and Weights   Height: 6' (182 9 cm)  IBW: 77 6 kg  Body mass index is 19 64 kg/m²  Weight (last 2 days)     Date/Time   Weight    08/13/20 0549   65 7 (144 84)    08/12/20 1853   69 6 (153 44)    08/12/20 1551   63 5 (139 99)                Nutrition       Diet Orders   (From admission, onward)             Start     Ordered    08/12/20 1909  Diet NPO; Sips with meds  Diet effective now     Question Answer Comment   Diet Type NPO    NPO Except: Sips with meds    RD to adjust diet per protocol?  Yes        08/12/20 1909                  Active Medications  Scheduled Meds:  Current Facility-Administered Medications   Medication Dose Route Frequency Provider Last Rate    cefTRIAXone  2,000 mg Intravenous Q24H Julianna A Ramella, CRNP 2,000 mg (08/13/20 0801)    ipratropium  0 5 mg Nebulization Q6H PRN Deborah Beth MD      lactulose  20 g Oral TID Lacy Leung, JUSNP      levalbuterol  1 25 mg Nebulization Q6H PRN Deborah Beth MD      magnesium sulfate  2 g Intravenous Once Julianna A Ramella, CRNP 2 g (08/13/20 0618)    metroNIDAZOLE  500 mg Intravenous Q8H Julianna A Ramella, CRNP 500 mg (08/13/20 0801)    multi-electrolyte  125 mL/hr Intravenous Continuous Abbi A Sedora, CRNP 125 mL/hr (08/13/20 0405)    nicotine  14 mg Transdermal Daily Abbi A Sedora, CRNP      norepinephrine  1-30 mcg/min Intravenous Titrated Abbi A Sedora, CRNP 4 mcg/min (08/13/20 6946)    pantoprazole  40 mg Intravenous Q24H [address] Abbi A Sedora, CRNP       Continuous Infusions:  multi-electrolyte, 125 mL/hr, Last Rate: 125 mL/hr (08/13/20 0405)  norepinephrine, 1-30 mcg/min, Last Rate: 4 mcg/min (08/13/20 0621)      PRN Meds:   ipratropium, 0 5 mg, Q6H PRN  levalbuterol, 1 25 mg, Q6H PRN        Invasive Devices Review  Invasive Devices     Central Venous Catheter Line            CVC Central Lines 08/12/20 Triple 20cm less than 1 day          Peripheral Intravenous Line            Peripheral IV 08/12/20 Dorsal (posterior); Right Forearm less than 1 day    Peripheral IV 08/12/20 Left Antecubital less than 1 day    Peripheral IV 08/12/20 Right Antecubital less than 1 day          Drain            Urethral Catheter Temperature probe 16 Fr  less than 1 day                Rationale for remaining devices: central line - vasopressors  ---------------------------------------------------------------------------------------  Advance Directive and Living Will:      Power of :    POLST:    ---------------------------------------------------------------------------------------  Care Time Delivered:   No Critical Care time spent       JACKIE/ROXANNE Mahoney      Portions of the record may have been created with voice recognition software  Occasional wrong word or "sound a like" substitutions may have occurred due to the inherent limitations of voice recognition software    Read the chart carefully and recognize, using context, where substitutions have occurred

## 2020-08-13 NOTE — ASSESSMENT & PLAN NOTE
· Secondary to hypovolemia  · On admission Na 130  · S/p 4 5 L IVF  · Na now 137  · Cont to trend BMP

## 2020-08-13 NOTE — ASSESSMENT & PLAN NOTE
Hx of decompensated cirrohosis with ascites, esophageal varices, pancytopenia, and coagulopathy  amonia level <10  INR 1 76  Plt 41  Continue home lactulose  Hold home spironolactone 2/2 shock  Has mild ascites  IR consulted for diagnotic paracentesis - unable to tap given little to no fluid volume

## 2020-08-13 NOTE — PROCEDURES
Central Line Insertion    Date/Time: 8/12/2020 9:59 PM  Performed by: Marjorie Barnard  Authorized by: Torsten Trujillo, [facility]     Patient location:  Bedside  Consent:     Consent obtained:  Written    Consent given by:  Patient    Risks discussed:  Pneumothorax, bleeding, incorrect placement and arterial puncture    Alternatives discussed:  No treatment  Universal protocol:     Procedure explained and questions answered to patient or proxy's satisfaction: yes      Relevant documents present and verified: yes      Test results available and properly labeled: yes      Radiology Images displayed and confirmed  If images not available, report reviewed: yes      Required blood products, implants, devices, and special equipment available: yes      Site/side marked: yes      Immediately prior to procedure, a time out was called: yes      Patient identity confirmed:  Verbally with patient and arm band  Pre-procedure details:     Hand hygiene: Hand hygiene performed prior to insertion      Sterile barrier technique: All elements of maximal sterile technique followed      Skin preparation:  2% chlorhexidine    Skin preparation agent: Skin preparation agent completely dried prior to procedure    Indications:     Central line indications: medications requiring central line and hemodynamic monitoring    Sedation:     Sedation type: Anxiolysis  Anesthesia (see MAR for exact dosages):      Anesthesia method:  Local infiltration    Local anesthetic:  Lidocaine 1% w/o epi  Procedure details:     Location:  Left internal jugular    Vessel type: vein      Laterality:  Left    Approach: percutaneous technique used      Patient position:  Reverse Trendelenburg    Catheter type:  Triple lumen 20cm    Catheter size:  7 Fr    Landmarks identified: yes      Ultrasound guidance: yes      Sterile ultrasound techniques: Sterile gel and sterile probe covers were used      Number of attempts:  1  Post-procedure details: Post-procedure:  Dressing applied and line sutured    Assessment:  Blood return through all ports, no pneumothorax on x-ray, placement verified by x-ray and free fluid flow    Post-procedure complications: none      Patient tolerance of procedure:   Tolerated well, no immediate complications

## 2020-08-13 NOTE — ASSESSMENT & PLAN NOTE
· Sober from alcohol over 2 years  · Sober from opioids 11 months  · Caution with controlled substances as patient has struggled with addiction in the past  · Pt now with c/o high anxiety and wanting to leave AMA  · Ativan 1mg Q4 for anxiety

## 2020-08-13 NOTE — ASSESSMENT & PLAN NOTE
· Pt unable to void after admission  · Bladder scan >450 cc  · Osuna inserted, now d/c'd and voiding  · Check PVR

## 2020-08-13 NOTE — ASSESSMENT & PLAN NOTE
Hgb 8 5 yesterday and down to 6 5  Baseline appears to be around 8 5  Repeat hemoglobin pending  No signs of active bleeding-denied hematemesis, melenic stools  Type and screened for 1 Unit PRBC  If repeat Hgb less than  7 will transfuse   Consult GI  Continue to trend

## 2020-08-13 NOTE — ASSESSMENT & PLAN NOTE
· Secondary to cirrhosis  · WBC baseline 2  · Currently with leukocytosis in setting of possible sepsis  · Hgb baseline 8 5  · Transfuse if <7 or active bleeding   · Plt baseline 60  · Transfuse if <20 or active bleeding

## 2020-08-13 NOTE — ASSESSMENT & PLAN NOTE
· Secondary to esophageal banding  · Pt can only tolerate soft moist foods  · Cont dental soft diet, Ensure supplements, magic cup  · Nutrition consult

## 2020-08-13 NOTE — ASSESSMENT & PLAN NOTE
In the setting of shock superimposed on cirrhosis  Slightly improved since admission  Continue to trend

## 2020-08-13 NOTE — H&P
H&P- Emma Gamez 1960, 62 y o  male MRN: [id number]    Unit/Bed#: -01 Encounter: [id number]    Primary Care Provider: Bryan Reeves   Date and time admitted to hospital: 8/12/2020  3:49 PM        * Shock [facility])  Assessment & Plan  In the setting hypovolemia blood loss versus sepsis  Versus heat stroke  SBP 85/53, low grade temp 100 6,   Patient seen in ED for same yesterday and received IV fluids-reports 2 days of working outside in the heat (Temp 90's)  Fluid resuscitated with 3 Liters of NSS and was started on peripheral levophed ( patient currently refusing a central line)  Received another liter NSS and levophed titrated for MAP > 65  CXR-NAD   BC x 2 pending  Received 1 dose Ceftriaxone  Ua pending-will monitor off antibitioics      Acute blood loss anemia  Assessment & Plan  Hgb 8 5 yesterday and down to 6 5  Baseline appears to be around 8 5  Repeat hemoglobin pending  No signs of active bleeding-denied hematemesis, melenic stools  Type and screened for 1 Unit PRBC  If repeat Hgb less than  7 will transfuse   Consult GI  Continue to trend    Lactic acid acidosis  Assessment & Plan  In the setting of shock   LA 5 1-fluid resuscitated with 4 L NSS  Repeat LA pending  Continue to trend    COPD (chronic obstructive pulmonary disease) (HCC)  Assessment & Plan  Albuterol nebs prn    Tobacco dependence  Assessment & Plan  Smokes 15 cigarettes/day  Nicotine patch  Smoking cessation    Gastroesophageal reflux disease  Assessment & Plan  Change home protonix to Protonix 40 mg IV      Esophageal varices determined by endoscopy [facility])  Assessment & Plan  Last EGD 7/8/2020 by Dr Kim Florez  Grade I non bleeding Esophageal varices noted in lower third of esophagus-was not banded 2/2 extensive scarring  Patient without hematemesis  Hold home inderal and lasix in setting of shock    Hypokalemia  Assessment & Plan  3 0 on admit  Replete prn   Trend bmp's    Alcoholic cirrhosis ([address] )  Assessment & Plan  Hx of cirrohosis  amonia level pending  Continue home lactulose  Hold home spirolactone 2/2 shock  Has mild ascites    Elevated LFTs  Assessment & Plan  In the setting of shock  AST 53, Alk phos 246  Continue to trend    -------------------------------------------------------------------------------------------------------------  Chief Complaint: "Fatigue"    History of Present Illness     Millicent Melissa is a 62 y o  male with pmh of Htn, cirrhosis, esophageal varices and smoker who presents with fatigue and chills  Patient states he felt tired and hot  Reported working with his neighbor outside in the heat for the past two days  Denies cough, shortness of breath or chest pain  Denied n/v/d  Denies any hematemesis of melena  Patient was seen in the ED yesterday for the same and given IV fluids and requested to go home  In the ED patient had low grade temp 100 6 ,hypotensive with SBP of 85/53, lactic acid 5 1 , Hgb 6 7 Fluid resuscitated with 3 L Nss and started on levophed peripherally  CXR with NAD   BC x 2 pending and received Ceftriaxone x 1 dose  Admitted to the ICU for shock, lactic acidosis and vassopressors  History obtained from chart review and the patient   -------------------------------------------------------------------------------------------------------------  Dispo: Admit to Critical Care     Code Status: Level 1 - Full Code  --------------------------------------------------------------------------------------------------------------  Review of Systems   Constitutional: Positive for chills and fatigue  Negative for appetite change and fever  HENT: Negative for congestion and sore throat  Respiratory: Negative for cough, chest tightness and shortness of breath  Cardiovascular: Negative for chest pain, palpitations and leg swelling  Gastrointestinal: Negative for abdominal distention, abdominal pain, blood in stool, diarrhea, nausea and vomiting  Genitourinary: Negative for difficulty urinating  Musculoskeletal: Negative for arthralgias and gait problem  Neurological: Negative for dizziness, light-headedness, numbness and headaches  All other systems reviewed and are negative  A 12-point, complete review of systems was reviewed and negative except as stated above     Physical Exam  Constitutional:       Appearance: He is ill-appearing  HENT:      Head: Normocephalic and atraumatic  Nose: Nose normal       Mouth/Throat:      Mouth: Mucous membranes are dry  Eyes:      Extraocular Movements: Extraocular movements intact  Conjunctiva/sclera: Conjunctivae normal       Pupils: Pupils are equal, round, and reactive to light  Neck:      Musculoskeletal: Normal range of motion and neck supple  Cardiovascular:      Rate and Rhythm: Normal rate and regular rhythm  Pulses: Normal pulses  Heart sounds: Normal heart sounds  No murmur  Pulmonary:      Effort: Pulmonary effort is normal       Breath sounds: Normal breath sounds  No wheezing  Abdominal:      General: Bowel sounds are normal  There is no distension  Palpations: Abdomen is soft  Tenderness: There is no abdominal tenderness  Musculoskeletal: Normal range of motion  Skin:     General: Skin is warm and dry  Capillary Refill: Capillary refill takes 2 to 3 seconds  Neurological:      Mental Status: He is alert        --------------------------------------------------------------------------------------------------------------  Vitals:   Vitals:    08/12/20 1829 08/12/20 1853 08/12/20 1857 08/12/20 2016   BP: 90/50  106/57 (!) 77/40   BP Location: Left arm      Pulse: 72  83 71   Resp: 18  (!) 34 (!) 27   Temp:       TempSrc:       SpO2: 96%  97% 99%   Weight:  69 6 kg (153 lb 7 oz)     Height:  6' (1 829 m)       Temp  Min: 100 6 °F (38 1 °C)  Max: 100 6 °F (38 1 °C)  IBW: 77 6 kg  Height: 6' (182 9 cm)  Body mass index is 20 81 kg/m²      Laboratory and Diagnostics:  Results from last 7 days   Lab Units 08/12/20 1956 08/12/20  1617 08/11/20  1935   WBC Thousand/uL  --  2 26* 2 83*   HEMOGLOBIN g/dL 8 0* 6 7* 8 5*   HEMATOCRIT %  --  22 2* 27 3*   PLATELETS Thousands/uL  --  41* 56*   BANDS PCT %  --  12* 4   MONO PCT %  --  0* 2*     Results from last 7 days   Lab Units 08/12/20 1927 08/12/20  1619 08/11/20  1935   SODIUM mmol/L 131* 130* 132*   POTASSIUM mmol/L 2 9* 3 0* 3 3*   CHLORIDE mmol/L 98* 96* 97*   CO2 mmol/L 21 21 25   ANION GAP mmol/L 12 13 10   BUN mg/dL 13 13 6   CREATININE mg/dL 1 35* 1 29 0 85   CALCIUM mg/dL 6 6* 8 0* 7 8*   GLUCOSE RANDOM mg/dL 88 80 91   ALT U/L 21 21  --    AST U/L 68* 53*  --    ALK PHOS U/L 177* 246*  --    ALBUMIN g/dL 2 5* 3 1*  --    TOTAL BILIRUBIN mg/dL 2 50* 2 40*  --           Results from last 7 days   Lab Units 08/12/20  1618   INR  1 76*   PTT seconds 37      Results from last 7 days   Lab Units 08/12/20  1617   TROPONIN I ng/mL 0 03     Results from last 7 days   Lab Units 08/12/20 1927 08/12/20  1617   LACTIC ACID mmol/L 4 6* 5 1*     ABG:    VBG:          Micro:  Results from last 7 days   Lab Units 08/11/20 1935   BLOOD CULTURE  Received in Microbiology Lab  Culture in Progress  Received in Microbiology Lab  Culture in Progress  EKG:NSR   Imaging:  XR chest 2 views   Final Result by Justin Hamilton MD (08/12 1640)      No acute cardiopulmonary disease              Workstation performed: [id number]            I have personally reviewed pertinent films in PACS      Historical Information   Past Medical History:   Diagnosis Date    Ascites     Cardiac disease     Chronic left-sided headaches     Chronic pain     back and neck    Chronic pain disorder     [facility]) [facility] [address]     Esophageal varices in cirrhosis [address] [address]     History of transfusion     Hypertension      Past Surgical History:   Procedure Laterality Date    BACK SURGERY      CERVICAL FUSION      CHOLECYSTECTOMY      COLONOSCOPY  11/23/2019    Internal external hemorrhoids, nonbleeding rectal varices    CORONARY ANGIOPLASTY WITH STENT PLACEMENT      2006-PTCA/STENT to mid and distal RCA; then 2009 Left-LAD normal, circumflex-normal,Proximal % stnosis, Chronic total occlusion    EGD  01/11/2006    HERNIA REPAIR      IR PARACENTESIS  7/15/2020    JOINT REPLACEMENT Left     knee    KNEE SURGERY      NECK SURGERY      PARACENTESIS      [facility]    UPPER GASTROINTESTINAL ENDOSCOPY  01/11/2006    Gastritis and duodenitis    Pathology negative for any abnormality    UPPER GASTROINTESTINAL ENDOSCOPY  11/23/2019    Grade 3 esophageal varices and portal hypertensive gastropathy     Social History   Social History     Substance and Sexual Activity   Alcohol Use Not Currently     Social History     Substance and Sexual Activity   Drug Use No     Social History     Tobacco Use   Smoking Status Current Every Day Smoker    Packs/day: 0 50    Types: Cigarettes   Smokeless Tobacco Never Used   Tobacco Comment    encouraged smoking cessation     Exercise History: as tolerated  Family History:   Family History   Problem Relation Age of Onset    Heart disease Father     Heart disease Brother     Cardiomyopathy Brother     Colon polyps Neg Hx     Colon cancer Neg Hx      I have reviewed this patient's family history and commented on sigificant items within the HPI      Medications:  Current Facility-Administered Medications   Medication Dose Route Frequency    albuterol inhalation solution 2 5 mg  2 5 mg Nebulization Q6H PRN    lactulose 20 g/30 mL oral solution 20 g  20 g Oral TID    multi-electrolyte (PLASMALYTE-A/ISOLYTE-S PH 7 4) IV solution 1,000 mL  1,000 mL Intravenous Once    multi-electrolyte (PLASMALYTE-A/ISOLYTE-S PH 7 4) IV solution  125 mL/hr Intravenous Continuous    nicotine (NICODERM CQ) 14 mg/24hr TD 24 hr patch 14 mg  14 mg Transdermal Daily    norepinephrine (LEVOPHED) 4 mg (STANDARD CONCENTRATION) IV in sodium chloride 0 9% 250 mL  1-30 mcg/min Intravenous Titrated    [START ON 8/13/2020] pantoprazole (PROTONIX) injection 40 mg  40 mg Intravenous Q24H CONSTANTINO    potassium chloride 10 % oral solution 40 mEq  40 mEq Oral Q2H     Home medications:  Prior to Admission Medications   Prescriptions Last Dose Informant Patient Reported? Taking? albuterol (2 5 mg/3 mL) 0 083 % nebulizer solution  Self Yes No   Sig: Take 2 5 mg by nebulization every 6 (six) hours as needed for wheezing or shortness of breath   albuterol (PROVENTIL HFA,VENTOLIN HFA) 90 mcg/act inhaler  Self Yes No   Sig: Inhale 2 puffs as needed for shortness of breath   amoxicillin-clavulanate (AUGMENTIN) 875-125 mg per tablet Not Taking at Unknown time  No No   Sig: Take 1 tablet by mouth every 12 (twelve) hours for 5 days   Patient not taking: Reported on 8/12/2020   furosemide (LASIX) 40 mg tablet 8/12/2020 at Unknown time Self No Yes   Sig: Take 1 tablet (40 mg total) by mouth daily   lactulose 20 g/30 mL 8/12/2020 at Unknown time Self No Yes   Sig: Take 30 mL (20 g total) by mouth 3 (three) times a day   pantoprazole (PROTONIX) 40 mg tablet 8/12/2020 at Unknown time Self No Yes   Sig: Take 1 tablet (40 mg total) by mouth daily   propranolol (INDERAL) 20 mg tablet 8/12/2020 at Unknown time Self No Yes   Sig: Take 1 tablet (20 mg total) by mouth daily   spironolactone (ALDACTONE) 25 mg tablet 8/12/2020 at Unknown time Self No Yes   Sig: Take 2 tablets (50 mg total) by mouth daily   spironolactone (ALDACTONE) 50 mg tablet 8/12/2020 at Unknown time  No Yes   Sig: Take 1 tablet (50 mg total) by mouth daily      Facility-Administered Medications: None     Allergies:   Allergies   Allergen Reactions    Flexeril [Cyclobenzaprine] Hallucinations           Morphine And Related Hives    Naprosyn [Naproxen] GI Intolerance    Ultram [Tramadol] GI Intolerance       ------------------------------------------------------------------------------------------------------------  Advance Directive and Living Will:      Power of :    POLST:    ------------------------------------------------------------------------------------------------------------  Anticipated Length of Stay is > 2 midnights    Care Time Delivered:   Upon my evaluation, this patient had a high probability of imminent or life-threatening deterioration due to shock , which required my direct attention, intervention, and personal management  I have personally provided 30 minutes (0650 to 0720) of critical care time, exclusive of procedures, teaching, family meetings, and any prior time recorded by providers other than myself  ROXANNE Velásquez        Portions of the record may have been created with voice recognition software  Occasional wrong word or "sound a like" substitutions may have occurred due to the inherent limitations of voice recognition software    Read the chart carefully and recognize, using context, where substitutions have occurred

## 2020-08-13 NOTE — ASSESSMENT & PLAN NOTE
Hx of cirrohosis  amonia level pending  Continue home lactulose  Hold home spirolactone 2/2 shock  Has mild ascites

## 2020-08-13 NOTE — ASSESSMENT & PLAN NOTE
· Secondary to cirrhosis  · WBC baseline 2  · Hgb baseline 8 5  · Transfuse if <7 or active bleeding  · Plt baseline 60  · Transfuse if <20 or active bleeding

## 2020-08-13 NOTE — ASSESSMENT & PLAN NOTE
High suspicion for Spontaneous Bacterial Peritonitis in setting of cirrhosis with ascites and recent EGD 8/5   In the ED SBP 85/53, low grade temp 100 6, initial WBC 2 repeat 14, PCT elevated  1 dose Ceftriaxone in the ED  CXR-NAD   BC x 2 NG24  UA - negative  MRSA - pending  PCT 80, 160  Received total 4 5 L IVF  Levophed initiated for refractory hypotension  Goal MAP > 65  ABX previously on hold however WBC increased from 2 - 14, will start Ceftriaxone and Flagyl for possible SBP  Unable to obtain cultures via paracentesis - not enough fluid present to tap

## 2020-08-13 NOTE — PLAN OF CARE
Problem: CARDIOVASCULAR - ADULT  Goal: Maintains optimal cardiac output and hemodynamic stability  Description: INTERVENTIONS:  - Monitor I/O, vital signs and rhythm  - Monitor for S/S and trends of decreased cardiac output  - Administer and titrate ordered vasoactive medications to optimize hemodynamic stability  - Assess quality of pulses, skin color and temperature  - Assess for signs of decreased coronary artery perfusion  - Instruct patient to report change in severity of symptoms  Outcome: Progressing  Goal: Absence of cardiac dysrhythmias or at baseline rhythm  Description: INTERVENTIONS:  - Continuous cardiac monitoring, vital signs, obtain 12 lead EKG if ordered  - Administer antiarrhythmic and heart rate control medications as ordered  - Monitor electrolytes and administer replacement therapy as ordered  Outcome: Progressing

## 2020-08-13 NOTE — ASSESSMENT & PLAN NOTE
In the setting acute blood loss vs sepsis vs heat induced hypovolemia  In the ED SBP 85/53, low grade temp 100 6  1 dose Ceftriaxone in the ED  CXR-NAD   BC x 2 pending  UA - negative  Received total 4 5 L IVF  Levophed initiated for refractory hypotension  Goal MAP > 65  Unlikely sepsis - no clear infectious process, cont to hold ABX

## 2020-08-13 NOTE — ASSESSMENT & PLAN NOTE
Last EGD 7/8/2020 by Dr Kirill Waters  Grade I non bleeding Esophageal varices noted in lower third of esophagus-was not banded 2/2 extensive scarring  Patient without hematemesis  Hold home inderal and lasix in setting of shock

## 2020-08-14 ENCOUNTER — TELEPHONE (OUTPATIENT)
Dept: GASTROENTEROLOGY | Facility: CLINIC | Age: 60
End: 2020-08-14

## 2020-08-14 VITALS
BODY MASS INDEX: 19.62 KG/M2 | HEIGHT: 72 IN | SYSTOLIC BLOOD PRESSURE: 123 MMHG | RESPIRATION RATE: 24 BRPM | WEIGHT: 144.84 LBS | HEART RATE: 81 BPM | OXYGEN SATURATION: 100 % | TEMPERATURE: 97.5 F | DIASTOLIC BLOOD PRESSURE: 67 MMHG

## 2020-08-14 LAB
ACANTHOCYTES BLD QL SMEAR: PRESENT
ALBUMIN SERPL BCP-MCNC: 2.3 G/DL (ref 3.5–5)
ALP SERPL-CCNC: 130 U/L (ref 46–116)
ALT SERPL W P-5'-P-CCNC: 21 U/L (ref 12–78)
ANION GAP SERPL CALCULATED.3IONS-SCNC: 8 MMOL/L (ref 4–13)
ANISOCYTOSIS BLD QL SMEAR: PRESENT
AST SERPL W P-5'-P-CCNC: 33 U/L (ref 5–45)
BASOPHILS # BLD MANUAL: 0 THOUSAND/UL (ref 0–0.1)
BASOPHILS NFR MAR MANUAL: 0 % (ref 0–1)
BILIRUB SERPL-MCNC: 0.7 MG/DL (ref 0.2–1)
BUN SERPL-MCNC: 14 MG/DL (ref 5–25)
CALCIUM SERPL-MCNC: 7.3 MG/DL (ref 8.3–10.1)
CHLORIDE SERPL-SCNC: 105 MMOL/L (ref 100–108)
CO2 SERPL-SCNC: 25 MMOL/L (ref 21–32)
CREAT SERPL-MCNC: 0.77 MG/DL (ref 0.6–1.3)
EOSINOPHIL # BLD MANUAL: 0 THOUSAND/UL (ref 0–0.4)
EOSINOPHIL NFR BLD MANUAL: 0 % (ref 0–6)
ERYTHROCYTE [DISTWIDTH] IN BLOOD BY AUTOMATED COUNT: 19.9 % (ref 11.6–15.1)
GFR SERPL CREATININE-BSD FRML MDRD: 99 ML/MIN/1.73SQ M
GLUCOSE SERPL-MCNC: 105 MG/DL (ref 65–140)
HCT VFR BLD AUTO: 24.6 % (ref 36.5–49.3)
HGB BLD-MCNC: 7.7 G/DL (ref 12–17)
HYPERCHROMIA BLD QL SMEAR: PRESENT
INR PPP: 1.6 (ref 0.84–1.19)
LYMPHOCYTES # BLD AUTO: 0.44 THOUSAND/UL (ref 0.6–4.47)
LYMPHOCYTES # BLD AUTO: 10 % (ref 14–44)
MAGNESIUM SERPL-MCNC: 1.7 MG/DL (ref 1.6–2.6)
MCH RBC QN AUTO: 22.7 PG (ref 26.8–34.3)
MCHC RBC AUTO-ENTMCNC: 31.3 G/DL (ref 31.4–37.4)
MCV RBC AUTO: 73 FL (ref 82–98)
MONOCYTES # BLD AUTO: 0 THOUSAND/UL (ref 0–1.22)
MONOCYTES NFR BLD: 0 % (ref 4–12)
MRSA NOSE QL CULT: NORMAL
NEUTROPHILS # BLD MANUAL: 3.96 THOUSAND/UL (ref 1.85–7.62)
NEUTS BAND NFR BLD MANUAL: 4 % (ref 0–8)
NEUTS SEG NFR BLD AUTO: 86 % (ref 43–75)
NRBC BLD AUTO-RTO: 0 /100 WBCS
OVALOCYTES BLD QL SMEAR: PRESENT
PHOSPHATE SERPL-MCNC: 1.9 MG/DL (ref 2.7–4.5)
PLATELET # BLD AUTO: 34 THOUSANDS/UL (ref 149–390)
PLATELET BLD QL SMEAR: ABNORMAL
POIKILOCYTOSIS BLD QL SMEAR: PRESENT
POTASSIUM SERPL-SCNC: 3.4 MMOL/L (ref 3.5–5.3)
PROCALCITONIN SERPL-MCNC: 95.91 NG/ML
PROT SERPL-MCNC: 5.1 G/DL (ref 6.4–8.2)
PROTHROMBIN TIME: 18.9 SECONDS (ref 11.6–14.5)
RBC # BLD AUTO: 3.39 MILLION/UL (ref 3.88–5.62)
SODIUM SERPL-SCNC: 138 MMOL/L (ref 136–145)
TOTAL CELLS COUNTED SPEC: 100
WBC # BLD AUTO: 4.4 THOUSAND/UL (ref 4.31–10.16)

## 2020-08-14 PROCEDURE — 85610 PROTHROMBIN TIME: CPT | Performed by: INTERNAL MEDICINE

## 2020-08-14 PROCEDURE — 84100 ASSAY OF PHOSPHORUS: CPT | Performed by: INTERNAL MEDICINE

## 2020-08-14 PROCEDURE — 84145 PROCALCITONIN (PCT): CPT | Performed by: INTERNAL MEDICINE

## 2020-08-14 PROCEDURE — 80053 COMPREHEN METABOLIC PANEL: CPT | Performed by: INTERNAL MEDICINE

## 2020-08-14 PROCEDURE — 85007 BL SMEAR W/DIFF WBC COUNT: CPT | Performed by: INTERNAL MEDICINE

## 2020-08-14 PROCEDURE — 83735 ASSAY OF MAGNESIUM: CPT | Performed by: INTERNAL MEDICINE

## 2020-08-14 PROCEDURE — 99238 HOSP IP/OBS DSCHRG MGMT 30/<: CPT | Performed by: NURSE PRACTITIONER

## 2020-08-14 PROCEDURE — 85027 COMPLETE CBC AUTOMATED: CPT | Performed by: INTERNAL MEDICINE

## 2020-08-14 PROCEDURE — NC001 PR NO CHARGE: Performed by: INTERNAL MEDICINE

## 2020-08-14 RX ORDER — MAGNESIUM SULFATE HEPTAHYDRATE 40 MG/ML
2 INJECTION, SOLUTION INTRAVENOUS ONCE
Status: COMPLETED | OUTPATIENT
Start: 2020-08-14 | End: 2020-08-14

## 2020-08-14 RX ORDER — POTASSIUM CHLORIDE 20 MEQ/1
40 TABLET, EXTENDED RELEASE ORAL ONCE
Status: COMPLETED | OUTPATIENT
Start: 2020-08-14 | End: 2020-08-14

## 2020-08-14 RX ADMIN — LORAZEPAM 1 MG: 2 INJECTION INTRAMUSCULAR; INTRAVENOUS at 06:25

## 2020-08-14 RX ADMIN — METRONIDAZOLE 500 MG: 500 INJECTION, SOLUTION INTRAVENOUS at 08:34

## 2020-08-14 RX ADMIN — POTASSIUM CHLORIDE 40 MEQ: 1500 TABLET, EXTENDED RELEASE ORAL at 08:43

## 2020-08-14 RX ADMIN — Medication 2 TABLET: at 09:32

## 2020-08-14 RX ADMIN — MAGNESIUM SULFATE IN WATER 2 G: 40 INJECTION, SOLUTION INTRAVENOUS at 07:55

## 2020-08-14 RX ADMIN — CEFTRIAXONE 2000 MG: 2 INJECTION, SOLUTION INTRAVENOUS at 07:47

## 2020-08-14 NOTE — PROGRESS NOTES
Progress Note Ciaran Noland 1960, 62 y o  male MRN: [id number]    Unit/Bed#: -01 Encounter: [id number]    Primary Care Provider: Meli Menjivar   Date and time admitted to hospital: 8/12/2020  3:49 PM        * Septic shock [facility])  Assessment & Plan  High suspicion for Spontaneous Bacterial Peritonitis in setting of cirrhosis with ascites and recent EGD 8/5   In the ED SBP 85/53, low grade temp 100 6, initial WBC 2 repeat 14, PCT elevated  1 dose Ceftriaxone in the ED  CXR-NAD   BC x 2 NG24  UA - negative  MRSA - pending  PCT 80, 160  Received total 4 5 L IVF  Levophed initiated for refractory hypotension  Goal MAP > 65  ABX previously on hold however WBC increased from 2 - 14, will start Ceftriaxone and Flagyl for possible SBP  Unable to obtain cultures via paracentesis - not enough fluid present to tap        Lactic acid acidosis  Assessment & Plan  In the setting of shock   LA 5 1-fluid resuscitated with 4 L NSS  Lactate trend 5 1, 4 6, 3 5, 3 9  Most likely unable to clear due to liver cirrhosis, stop trending      Acute blood loss anemia  Assessment & Plan  Secondary to GIB - pt with history of esophageal varies and LGIB in setting of cirrhosis   Hgb 6 5 in the ED , however repeat 8 0  Baseline appears to be around 8 5  No signs of active bleeding-denied hematemesis, melenic stools  Questionable vomiting at home per family  Type and screened for 1 Unit PRBC   If repeat Hgb less than  7 will transfuse   Consult GI   Continue to trend    COPD (chronic obstructive pulmonary disease) ([facility] [address] )  Assessment & Plan  No acute exacerbation - pt denied increase sputum, purulence, and SOB  Albuterol nebs prn      Alcoholic cirrhosis ([facility] [address] )  Assessment & Plan  Hx of decompensated cirrohosis with ascites, esophageal varices, pancytopenia, and coagulopathy  amonia level <10  INR 1 76  Plt 41  Continue home lactulose  Hold home spironolactone 2/2 shock  Has mild ascites  IR consulted for diagnotic paracentesis - unable to tap given little to no fluid volume    Esophageal varices determined by endoscopy [facility])  Assessment & Plan  Last EGD 8/5/2020 by Dr Anny Santos  Grade I non bleeding Esophageal varices noted in lower third of esophagus-was not banded 2/2 extensive scarring  Patient without hematemesis  Hold home Propanolol in setting of shock      Elevated LFTs  Assessment & Plan  In the setting of shock superimposed on cirrhosis  Slightly improved since admission  Continue to trend      Gastroesophageal reflux disease  Assessment & Plan  Cont home protonix    Urinary retention  Assessment & Plan  · Pt unable to void after admission  · Bladder scan >450 cc  · Osuna inserted, now d/c'd and voiding  · Check PVR    MIGDALIA (acute kidney injury) ([facility] )  Assessment & Plan  · Secondary to hypovolemia and shock  · Baseline Cr 0 85, on admission Cr 1 29, peak Cr 1 35  · S/p 4 5 L IVF  · Levophed initiated with goal MAP >65  · Avoid nephrotoxic agents   · Strict I/O  · Cont to trend BMP    Hyponatremia  Assessment & Plan  · Secondary to hypovolemia  · On admission Na 130  · S/p 4 5 L IVF  · Na now 137  · Cont to trend BMP     Pancytopenia (HCC)  Assessment & Plan  · Secondary to cirrhosis  · WBC baseline 2  · Currently with leukocytosis in setting of possible sepsis  · Hgb baseline 8 5  · Transfuse if <7 or active bleeding   · Plt baseline 60  · Transfuse if <20 or active bleeding     History of substance abuse ([facility] )  Assessment & Plan  · Sober from alcohol over 2 years  · Sober from opioids 11 months  · Caution with controlled substances as patient has struggled with addiction in the past  · Pt now with c/o high anxiety and wanting to leave AMA  · Ativan 1mg Q4 for anxiety    Tobacco dependence  Assessment & Plan  Smokes 15 cigarettes/day  Nicotine patch  Smoking cessation     Esophageal dysphagia  Assessment & Plan  · Secondary to esophageal banding  · Pt can only tolerate soft moist foods  · Cont dental soft diet, Ensure supplements, magic cup  · Nutrition consult        ----------------------------------------------------------------------------------------  HPI/24hr events:   Patient weaned off pressors x 24 hours  Tolerating PO Supplements  No other acute events  Disposition: Transfer to Med-Surg   Code Status: Level 1 - Full Code  ---------------------------------------------------------------------------------------  SUBJECTIVE  "I' m tired, just let me sleep "    Review of Systems  Review of systems was reviewed and negative unless stated above in HPI/24-hour events   ---------------------------------------------------------------------------------------  OBJECTIVE    Vitals   Vitals:    20 2300 20 0000 20 0100 20 0200   BP: 105/53 107/56 117/60 99/55   BP Location:       Pulse: 85 70 67 69   Resp: (!) 37 (!) 34 (!) 31 (!) 35   Temp: 98 1 °F (36 7 °C)      TempSrc: Oral      SpO2: 97% 95%     Weight:       Height:         Temp (24hrs), Av 2 °F (37 3 °C), Min:98 °F (36 7 °C), Max:99 9 °F (37 7 °C)  Current: Temperature: 98 1 °F (36 7 °C)          Respiratory:  SpO2 Activity: SpO2 Activity: At Rest    Invasive/non-invasive ventilation settings   Respiratory    Lab Data (Last 4 hours)    None         O2/Vent Data (Last 4 hours)    None                Physical Exam  Constitutional:       General: He is not in acute distress  Appearance: Normal appearance  HENT:      Head: Normocephalic and atraumatic  Eyes:      Extraocular Movements: Extraocular movements intact  Conjunctiva/sclera: Conjunctivae normal       Pupils: Pupils are equal, round, and reactive to light  Neck:      Musculoskeletal: Normal range of motion  Cardiovascular:      Rate and Rhythm: Normal rate and regular rhythm  Pulses: Normal pulses  Heart sounds: Normal heart sounds  Pulmonary:      Effort: Pulmonary effort is normal       Breath sounds: Normal breath sounds  No wheezing     Abdominal:      General: Bowel sounds are normal       Palpations: Abdomen is soft  Tenderness: There is no abdominal tenderness  Musculoskeletal: Normal range of motion  Skin:     General: Skin is warm and dry  Capillary Refill: Capillary refill takes 2 to 3 seconds  Coloration: Skin is jaundiced  Neurological:      General: No focal deficit present  Mental Status: He is alert and oriented to person, place, and time  Psychiatric:         Speech: Speech normal          Behavior: Behavior normal          Thought Content:  Thought content normal          Judgment: Judgment normal          Laboratory and Diagnostics:  Results from last 7 days   Lab Units 08/13/20  0525 08/12/20 1956 08/12/20 1617 08/11/20  1935   WBC Thousand/uL 14 82*  --  2 26* 2 83*   HEMOGLOBIN g/dL 8 2* 8 0* 6 7* 8 5*   HEMATOCRIT % 25 8*  --  22 2* 27 3*   PLATELETS Thousands/uL 49*  --  41* 56*   BANDS PCT %  --   --  12* 4   MONO PCT %  --   --  0* 2*     Results from last 7 days   Lab Units 08/13/20  0518 08/12/20 1927 08/12/20  1619 08/11/20  1935   SODIUM mmol/L 137 131* 130* 132*   POTASSIUM mmol/L 4 0 2 9* 3 0* 3 3*   CHLORIDE mmol/L 103 98* 96* 97*   CO2 mmol/L 19* 21 21 25   ANION GAP mmol/L 15* 12 13 10   BUN mg/dL 14 13 13 6   CREATININE mg/dL 1 29 1 35* 1 29 0 85   CALCIUM mg/dL 6 8* 6 6* 8 0* 7 8*   GLUCOSE RANDOM mg/dL 95 88 80 91   ALT U/L 29 21 21  --    AST U/L 61* 68* 53*  --    ALK PHOS U/L 161* 177* 246*  --    ALBUMIN g/dL 2 6* 2 5* 3 1*  --    TOTAL BILIRUBIN mg/dL 2 50* 2 50* 2 40*  --      Results from last 7 days   Lab Units 08/13/20 0518   MAGNESIUM mg/dL 1 2*   PHOSPHORUS mg/dL 2 9      Results from last 7 days   Lab Units 08/12/20 1618   INR  1 76*   PTT seconds 37      Results from last 7 days   Lab Units 08/12/20 1617   TROPONIN I ng/mL 0 03     Results from last 7 days   Lab Units 08/13/20  0524 08/12/20  2243 08/12/20 1927 08/12/20  1617   LACTIC ACID mmol/L 3 9* 3 5* 4 6* 5 1*     ABG:    VBG:    Results from last 7 days Lab Units 08/13/20  0518 08/12/20  1618   PROCALCITONIN ng/ml 160 40* 82 74*       Micro  Results from last 7 days   Lab Units 08/12/20  1618 08/12/20  1617 08/11/20  1935   BLOOD CULTURE  No Growth at 24 hrs  No Growth at 24 hrs  No Growth at 24 hrs  No Growth at 24 hrs  EKG: NSR HR 70's  Imaging: no new imaging    Intake and Output  I/O       08/12 0701 - 08/13 0700 08/13 0701 - 08/14 0700    P  O  480 1080    I V  (mL/kg) 3616 8 (55 1) 2068 8 (31 5)    IV Piggyback 1950 299 2    Total Intake(mL/kg) 6046 8 (92) 3448 (52 5)    Urine (mL/kg/hr) 2125 1448 (0 9)    Stool 0 0    Total Output 2125 1448    Net +3921 8 +2000          Unmeasured Stool Occurrence 1 x 3 x          Height and Weights   Height: 6' (182 9 cm)  IBW: 77 6 kg  Body mass index is 19 64 kg/m²  Weight (last 2 days)     Date/Time   Weight    08/13/20 0549   65 7 (144 84)    08/12/20 1853   69 6 (153 44)    08/12/20 1551   63 5 (139 99)                Nutrition       Diet Orders   (From admission, onward)             Start     Ordered    08/13/20 1434  Dietary nutrition supplements  Once     Question Answer Comment   Select Supplement: Ensure Pudding-Vanila    Frequency Breakfast, Lunch, Dinner        08/13/20 1433    08/13/20 1434  Dietary nutrition supplements  Once     Question Answer Comment   Select Supplement: Magic [employer], Lunch, Dinner        08/13/20 1433    08/13/20 1433  Dietary nutrition supplements  Once     Question Answer Comment   Select Supplement: Ensure Enlive-Chocolate    Frequency Breakfast, Lunch, Dinner        08/13/20 1433    08/13/20 0953  Diet Dysphagia/Modified Consistency; Dysphagia 3-Dental Soft; Thin Liquid  Diet effective now     Question Answer Comment   Diet Type Dysphagia/Modified Consistency    Dysphagia/Modified Consistency Dysphagia 3-Dental Soft    Liquid Modifier Thin Liquid    RD to adjust diet per protocol?  Yes        08/13/20 0953                  Active Medications  Scheduled Meds:  Current Facility-Administered Medications   Medication Dose Route Frequency Provider Last Rate    cefTRIAXone  2,000 mg Intravenous Q24H ROXANNE Parham Stopped (08/13/20 2000)    ipratropium  0 5 mg Nebulization Q6H PRN Darby Harry MD      lactulose  20 g Oral TID ROXANNE Singh      levalbuterol  1 25 mg Nebulization Q6H PRN Darby Harry MD      LORazepam  1 mg Intravenous Q4H PRN ROXANNE Parham      metroNIDAZOLE  500 mg Intravenous Q8H JUS ParhamNP Stopped (08/14/20 0000)    multi-electrolyte  75 mL/hr Intravenous Continuous JUS ParhamNP 75 mL/hr (08/13/20 2037)    nicotine  14 mg Transdermal Daily ROXANNE Singh      norepinephrine  1-30 mcg/min Intravenous Titrated ROXANNE Singh Stopped (08/13/20 0940)    pantoprazole  40 mg Oral Daily ROXANNE Parham       Continuous Infusions:  multi-electrolyte, 75 mL/hr, Last Rate: 75 mL/hr (08/13/20 2037)  norepinephrine, 1-30 mcg/min, Last Rate: Stopped (08/13/20 0940)      PRN Meds:   ipratropium, 0 5 mg, Q6H PRN  levalbuterol, 1 25 mg, Q6H PRN  LORazepam, 1 mg, Q4H PRN        Invasive Devices Review  Invasive Devices     Central Venous Catheter Line            CVC Central Lines 08/12/20 Triple 20cm 1 day          Peripheral Intravenous Line            Peripheral IV 08/12/20 Dorsal (posterior); Right Forearm 1 day    Peripheral IV 08/12/20 Left Antecubital 1 day    Peripheral IV 08/12/20 Right Antecubital 1 day                Rationale for remaining devices: central line  ---------------------------------------------------------------------------------------  Advance Directive and Living Will:      Power of :    POLST:    ---------------------------------------------------------------------------------------  Care Time Delivered:   No Critical Care time spent       [employer], ROXANNE      Portions of the record may have been created with voice recognition software  Occasional wrong word or "sound a like" substitutions may have occurred due to the inherent limitations of voice recognition software    Read the chart carefully and recognize, using context, where substitutions have occurred

## 2020-08-14 NOTE — LETTER
August 14, 9580    VIA CERTIFIED MAIL, RETURN RECEIPT REQUESTED  RECEIPT # [id number] [phone]  AND FIRST CLASS MAIL    Mr Jaimie Remy  [address]  [facility], [address]    Dear Mr Lopez:    It has become apparent that we will no longer be able to maintain a physician-patient relationship due to failure to cooperate in your medical care and comply with state regulations  Consequently, we are discharging you from our practice and respectfully request that you make other arrangements for your care  Our practice will provide you with emergency care only for 30 days from your receipt of this letter  We suggest that you go to the nearest Emergency Department in the event of an emergency medical condition and promptly make arrangements for your future care  Please have your new physician's office contact our office and we will arrange to forward your medical records accordingly      Respectfully,        Savage Zamora MD

## 2020-08-14 NOTE — NURSING NOTE
Pt signed AMA form with Dr Heide Enamorado  Reviewed quick discharge instructions with pt because pt anxious to leave  Removed IVs   Pt walked to exit

## 2020-08-14 NOTE — DISCHARGE SUMMARY
Discharge Summary - Segun Joshi 62 y o  male MRN: [id number]    Unit/Bed#: -01 Encounter: [id number]    Admission Date:   Admission Orders (From admission, onward)     Ordered        08/12/20 1755  Inpatient Admission  Once                     Admitting Diagnosis: Heat exhaustion [T67  5XXA]  Septic shock ([facility] [address] ) [A41 9, R65 21]    HPI: per Odell OLIVEIRA- Segun Joshi is a 62 y o  male with pmh of Htn, cirrhosis, esophageal varices and smoker who presents with fatigue and chills  Patient states he felt tired and hot  Reported working with his neighbor outside in the heat for the past two days  Denies cough, shortness of breath or chest pain  Denied n/v/d  Denies any hematemesis of melena  Patient was seen in the ED yesterday for the same and given IV fluids and requested to go home  In the ED patient had low grade temp 100 6 ,hypotensive with SBP of 85/53, lactic acid 5 1 , Hgb 6 7 Fluid resuscitated with 3 L Nss and started on levophed peripherally  CXR with NAD   BC x 2 pending and received Ceftriaxone x 1 dose  Admitted to the ICU for shock, lactic acidosis and vassopressors  Patient off vasopressors on 08/13 in a [facility] Patient continued on IV fluids overnight  Patient was on ceftriaxone and Flagyl day 3 today, however WBCs are back to normal 4 4 and patient has remained afebrile overnight      Procedures Performed:   Orders Placed This Encounter   Procedures   [address] Course:   8/12 LIJ TLC placed and started on levophed  8/13 Stopped levophed  8/14 IJ TLC removed and pt received electrolyte replacement    Significant Findings, Care, Treatment and Services Provided:  None    Complications:  Concern for electrolyte abnormalities    Discharge Diagnosis:  Fair    Resolved Problems  Date Reviewed: 8/14/2020          Resolved    Hypokalemia 8/13/2020     Resolved by  ROXANNE Vanegas          Condition at Discharge: poor         Discharge instructions/Information to patient and family:   See after visit summary for information provided to patient and family  Provisions for Follow-Up Care:  See after visit summary for information related to follow-up care and any pertinent home health orders  PCP: [facility]    Disposition: Left against medical advice    Planned Readmission: No      Discharge Statement   I spent 15 minutes discharging the patient  This time was spent on the day of discharge  I had direct contact with the patient on the day of discharge  Additional documentation is required if more than 30 minutes were spent on discharge  Discharge Medications:  See after visit summary for reconciled discharge medications provided to patient and family

## 2020-08-14 NOTE — PLAN OF CARE
Problem: CARDIOVASCULAR - ADULT  Goal: Maintains optimal cardiac output and hemodynamic stability  Description: INTERVENTIONS:  - Monitor I/O, vital signs and rhythm  - Monitor for S/S and trends of decreased cardiac output  - Administer and titrate ordered vasoactive medications to optimize hemodynamic stability  - Assess quality of pulses, skin color and temperature  - Assess for signs of decreased coronary artery perfusion  - Instruct patient to report change in severity of symptoms  Outcome: Adequate for Discharge  Goal: Absence of cardiac dysrhythmias or at baseline rhythm  Description: INTERVENTIONS:  - Continuous cardiac monitoring, vital signs, obtain 12 lead EKG if ordered  - Administer antiarrhythmic and heart rate control medications as ordered  - Monitor electrolytes and administer replacement therapy as ordered  Outcome: Adequate for Discharge     Problem: RESPIRATORY - ADULT  Goal: Achieves optimal ventilation and oxygenation  Description: INTERVENTIONS:  - Assess for changes in respiratory status  - Assess for changes in mentation and behavior  - Position to facilitate oxygenation and minimize respiratory effort  - Oxygen administered by appropriate delivery if ordered  - Initiate smoking cessation education as indicated  - Encourage broncho-pulmonary hygiene including cough, deep breathe, Incentive Spirometry  - Assess the need for suctioning and aspirate as needed  - Assess and instruct to report SOB or any respiratory difficulty  - Respiratory Therapy support as indicated  Outcome: Adequate for Discharge     Problem: GASTROINTESTINAL - ADULT  Goal: Minimal or absence of nausea and/or vomiting  Description: INTERVENTIONS:  - Administer IV fluids if ordered to ensure adequate hydration  - Maintain NPO status until nausea and vomiting are resolved  - Nasogastric tube if ordered  - Administer ordered antiemetic medications as needed  - Provide nonpharmacologic comfort measures as appropriate  - Advance diet as tolerated, if ordered  - Consider nutrition services referral to assist patient with adequate nutrition and appropriate food choices  Outcome: Adequate for Discharge  Goal: Maintains or returns to baseline bowel function  Description: INTERVENTIONS:  - Assess bowel function  - Encourage oral fluids to ensure adequate hydration  - Administer IV fluids if ordered to ensure adequate hydration  - Administer ordered medications as needed  - Encourage mobilization and activity  - Consider nutritional services referral to assist patient with adequate nutrition and appropriate food choices  Outcome: Adequate for Discharge  Goal: Maintains adequate nutritional intake  Description: INTERVENTIONS:  - Monitor percentage of each meal consumed  - Identify factors contributing to decreased intake, treat as appropriate  - Assist with meals as needed  - Monitor I&O, weight, and lab values if indicated  - Obtain nutrition services referral as needed  Outcome: Adequate for Discharge  Goal: Establish and maintain optimal ostomy function  Description: INTERVENTIONS:  - Assess bowel function  - Encourage oral fluids to ensure adequate hydration  - Administer IV fluids if ordered to ensure adequate hydration   - Administer ordered medications as needed  - Encourage mobilization and activity  - Nutrition services referral to assist patient with appropriate food choices  - Assess stoma site  - Consider wound care consult   Outcome: Adequate for Discharge     Problem: GENITOURINARY - ADULT  Goal: Maintains or returns to baseline urinary function  Description: INTERVENTIONS:  - Assess urinary function  - Encourage oral fluids to ensure adequate hydration if ordered  - Administer IV fluids as ordered to ensure adequate hydration  - Administer ordered medications as needed  - Offer frequent toileting  - Follow urinary retention protocol if ordered  Outcome: Adequate for Discharge  Goal: Absence of urinary retention  Description: INTERVENTIONS:  - Assess patients ability to void and empty bladder  - Monitor I/O  - Bladder scan as needed  - Discuss with physician/AP medications to alleviate retention as needed  - Discuss catheterization for long term situations as appropriate  Outcome: Adequate for Discharge  Goal: Urinary catheter remains patent  Description: INTERVENTIONS:  - Assess patency of urinary catheter  - If patient has a chronic dickson, consider changing catheter if non-functioning  - Follow guidelines for intermittent irrigation of non-functioning urinary catheter  Outcome: Adequate for Discharge     Problem: METABOLIC, FLUID AND ELECTROLYTES - ADULT  Goal: Electrolytes maintained within normal limits  Description: INTERVENTIONS:  - Monitor labs and assess patient for signs and symptoms of electrolyte imbalances  - Administer electrolyte replacement as ordered  - Monitor response to electrolyte replacements, including repeat lab results as appropriate  - Instruct patient on fluid and nutrition as appropriate  Outcome: Adequate for Discharge  Goal: Fluid balance maintained  Description: INTERVENTIONS:  - Monitor labs   - Monitor I/O and WT  - Instruct patient on fluid and nutrition as appropriate  - Assess for signs & symptoms of volume excess or deficit  Outcome: Adequate for Discharge  Goal: Glucose maintained within target range  Description: INTERVENTIONS:  - Monitor Blood Glucose as ordered  - Assess for signs and symptoms of hyperglycemia and hypoglycemia  - Administer ordered medications to maintain glucose within target range  - Assess nutritional intake and initiate nutrition service referral as needed  Outcome: Adequate for Discharge     Problem: HEMATOLOGIC - ADULT  Goal: Maintains hematologic stability  Description: INTERVENTIONS  - Assess for signs and symptoms of bleeding or hemorrhage  - Monitor labs  - Administer supportive blood products/factors as ordered and appropriate  Outcome: Adequate for Discharge Problem: PAIN - ADULT  Goal: Verbalizes/displays adequate comfort level or baseline comfort level  Description: Interventions:  - Encourage patient to monitor pain and request assistance  - Assess pain using appropriate pain scale  - Administer analgesics based on type and severity of pain and evaluate response  - Implement non-pharmacological measures as appropriate and evaluate response  - Consider cultural and social influences on pain and pain management  - Notify physician/advanced practitioner if interventions unsuccessful or patient reports new pain  Outcome: Adequate for Discharge     Problem: INFECTION - ADULT  Goal: Absence or prevention of progression during hospitalization  Description: INTERVENTIONS:  - Assess and monitor for signs and symptoms of infection  - Monitor lab/diagnostic results  - Monitor all insertion sites, i e  indwelling lines, tubes, and drains  - Monitor endotracheal if appropriate and nasal secretions for changes in amount and color  - [place] appropriate cooling/warming therapies per order  - Administer medications as ordered  - Instruct and encourage patient and family to use good hand hygiene technique  - Identify and instruct in appropriate isolation precautions for identified infection/condition  Outcome: Adequate for Discharge  Goal: Absence of fever/infection during neutropenic period  Description: INTERVENTIONS:  - Monitor WBC    Outcome: Adequate for Discharge     Problem: SAFETY ADULT  Goal: Patient will remain free of falls  Description: INTERVENTIONS:  - Assess patient frequently for physical needs  -  Identify cognitive and physical deficits and behaviors that affect risk of falls    -  [place] fall precautions as indicated by assessment   - Educate patient/family on patient safety including physical limitations  - Instruct patient to call for assistance with activity based on assessment  - Modify environment to reduce risk of injury  - Consider OT/PT consult to assist with strengthening/mobility  Outcome: Adequate for Discharge  Goal: Maintain or return to baseline ADL function  Description: INTERVENTIONS:  -  Assess patient's ability to carry out ADLs; assess patient's baseline for ADL function and identify physical deficits which impact ability to perform ADLs (bathing, care of mouth/teeth, toileting, grooming, dressing, etc )  - Assess/evaluate cause of self-care deficits   - Assess range of motion  - Assess patient's mobility; develop plan if impaired  - Assess patient's need for assistive devices and provide as appropriate  - Encourage maximum independence but intervene and supervise when necessary  - Involve family in performance of ADLs  - Assess for home care needs following discharge   - Consider OT consult to assist with ADL evaluation and planning for discharge  - Provide patient education as appropriate  Outcome: Adequate for Discharge  Goal: Maintain or return mobility status to optimal level  Description: INTERVENTIONS:  - Assess patient's baseline mobility status (ambulation, transfers, stairs, etc )    - Identify cognitive and physical deficits and behaviors that affect mobility  - Identify mobility aids required to assist with transfers and/or ambulation (gait belt, sit-to-stand, lift, walker, cane, etc )  - [place] fall precautions as indicated by assessment  - Record patient progress and toleration of activity level on Mobility SBAR; progress patient to next Phase/Stage  - Instruct patient to call for assistance with activity based on assessment  - Consider rehabilitation consult to assist with strengthening/weightbearing, etc   Outcome: Adequate for Discharge     Problem: DISCHARGE PLANNING  Goal: Discharge to home or other facility with appropriate resources  Description: INTERVENTIONS:  - Identify barriers to discharge w/patient and caregiver  - Arrange for needed discharge resources and transportation as appropriate  - Identify discharge learning needs (meds, wound care, etc )  - Arrange for interpretive services to assist at discharge as needed  - Refer to Case Management Department for coordinating discharge planning if the patient needs post-hospital services based on physician/advanced practitioner order or complex needs related to functional status, cognitive ability, or social support system  Outcome: Adequate for Discharge     Problem: Knowledge Deficit  Goal: Patient/family/caregiver demonstrates understanding of disease process, treatment plan, medications, and discharge instructions  Description: Complete learning assessment and assess knowledge base  Interventions:  - Provide teaching at level of understanding  - Provide teaching via preferred learning methods  Outcome: Adequate for Discharge     Problem: Potential for Falls  Goal: Patient will remain free of falls  Description: INTERVENTIONS:  - Assess patient frequently for physical needs  -  Identify cognitive and physical deficits and behaviors that affect risk of falls  -  [place] fall precautions as indicated by assessment   - Educate patient/family on patient safety including physical limitations  - Instruct patient to call for assistance with activity based on assessment  - Modify environment to reduce risk of injury  - Consider OT/PT consult to assist with strengthening/mobility  Outcome: Adequate for Discharge     Problem: Nutrition/Hydration-ADULT  Goal: Nutrient/Hydration intake appropriate for improving, restoring or maintaining nutritional needs  Description: Monitor and assess patient's nutrition/hydration status for malnutrition  Collaborate with interdisciplinary team and initiate plan and interventions as ordered  Monitor patient's weight and dietary intake as ordered or per policy  Utilize nutrition screening tool and intervene as necessary  Determine patient's food preferences and provide high-protein, high-caloric foods as appropriate       INTERVENTIONS:  - Monitor oral intake, urinary output, labs, and treatment plans  - Assess nutrition and hydration status and recommend course of action  - Evaluate amount of meals eaten  - Assist patient with eating if necessary   - Allow adequate time for meals  - Recommend/ encourage appropriate diets, oral nutritional supplements, and vitamin/mineral supplements  - Order, calculate, and assess calorie counts as needed  - Recommend, monitor, and adjust tube feedings and TPN/PPN based on assessed needs  - Assess need for intravenous fluids  - Provide specific nutrition/hydration education as appropriate  - Include patient/family/caregiver in decisions related to nutrition  Outcome: Adequate for Discharge     Patient leaving AMA

## 2020-08-14 NOTE — PLAN OF CARE
Problem: CARDIOVASCULAR - ADULT  Goal: Maintains optimal cardiac output and hemodynamic stability  Description: INTERVENTIONS:  - Monitor I/O, vital signs and rhythm  - Monitor for S/S and trends of decreased cardiac output  - Administer and titrate ordered vasoactive medications to optimize hemodynamic stability  - Assess quality of pulses, skin color and temperature  - Assess for signs of decreased coronary artery perfusion  - Instruct patient to report change in severity of symptoms  Outcome: Progressing  Goal: Absence of cardiac dysrhythmias or at baseline rhythm  Description: INTERVENTIONS:  - Continuous cardiac monitoring, vital signs, obtain 12 lead EKG if ordered  - Administer antiarrhythmic and heart rate control medications as ordered  - Monitor electrolytes and administer replacement therapy as ordered  Outcome: Progressing     Problem: RESPIRATORY - ADULT  Goal: Achieves optimal ventilation and oxygenation  Description: INTERVENTIONS:  - Assess for changes in respiratory status  - Assess for changes in mentation and behavior  - Position to facilitate oxygenation and minimize respiratory effort  - Oxygen administered by appropriate delivery if ordered  - Initiate smoking cessation education as indicated  - Encourage broncho-pulmonary hygiene including cough, deep breathe, Incentive Spirometry  - Assess the need for suctioning and aspirate as needed  - Assess and instruct to report SOB or any respiratory difficulty  - Respiratory Therapy support as indicated  Outcome: Progressing     Problem: GASTROINTESTINAL - ADULT  Goal: Minimal or absence of nausea and/or vomiting  Description: INTERVENTIONS:  - Administer IV fluids if ordered to ensure adequate hydration  - Maintain NPO status until nausea and vomiting are resolved  - Nasogastric tube if ordered  - Administer ordered antiemetic medications as needed  - Provide nonpharmacologic comfort measures as appropriate  - Advance diet as tolerated, if ordered  - Consider nutrition services referral to assist patient with adequate nutrition and appropriate food choices  Outcome: Progressing  Goal: Maintains or returns to baseline bowel function  Description: INTERVENTIONS:  - Assess bowel function  - Encourage oral fluids to ensure adequate hydration  - Administer IV fluids if ordered to ensure adequate hydration  - Administer ordered medications as needed  - Encourage mobilization and activity  - Consider nutritional services referral to assist patient with adequate nutrition and appropriate food choices  Outcome: Progressing  Goal: Maintains adequate nutritional intake  Description: INTERVENTIONS:  - Monitor percentage of each meal consumed  - Identify factors contributing to decreased intake, treat as appropriate  - Assist with meals as needed  - Monitor I&O, weight, and lab values if indicated  - Obtain nutrition services referral as needed  Outcome: Progressing  Goal: Establish and maintain optimal ostomy function  Description: INTERVENTIONS:  - Assess bowel function  - Encourage oral fluids to ensure adequate hydration  - Administer IV fluids if ordered to ensure adequate hydration   - Administer ordered medications as needed  - Encourage mobilization and activity  - Nutrition services referral to assist patient with appropriate food choices  - Assess stoma site  - Consider wound care consult   Outcome: Progressing     Problem: GENITOURINARY - ADULT  Goal: Maintains or returns to baseline urinary function  Description: INTERVENTIONS:  - Assess urinary function  - Encourage oral fluids to ensure adequate hydration if ordered  - Administer IV fluids as ordered to ensure adequate hydration  - Administer ordered medications as needed  - Offer frequent toileting  - Follow urinary retention protocol if ordered  Outcome: Progressing  Goal: Absence of urinary retention  Description: INTERVENTIONS:  - Assess patients ability to void and empty bladder  - Monitor I/O  - Bladder scan as needed  - Discuss with physician/AP medications to alleviate retention as needed  - Discuss catheterization for long term situations as appropriate  Outcome: Progressing  Goal: Urinary catheter remains patent  Description: INTERVENTIONS:  - Assess patency of urinary catheter  - If patient has a chronic dickson, consider changing catheter if non-functioning  - Follow guidelines for intermittent irrigation of non-functioning urinary catheter  Outcome: Progressing     Problem: METABOLIC, FLUID AND ELECTROLYTES - ADULT  Goal: Electrolytes maintained within normal limits  Description: INTERVENTIONS:  - Monitor labs and assess patient for signs and symptoms of electrolyte imbalances  - Administer electrolyte replacement as ordered  - Monitor response to electrolyte replacements, including repeat lab results as appropriate  - Instruct patient on fluid and nutrition as appropriate  Outcome: Progressing  Goal: Fluid balance maintained  Description: INTERVENTIONS:  - Monitor labs   - Monitor I/O and WT  - Instruct patient on fluid and nutrition as appropriate  - Assess for signs & symptoms of volume excess or deficit  Outcome: Progressing  Goal: Glucose maintained within target range  Description: INTERVENTIONS:  - Monitor Blood Glucose as ordered  - Assess for signs and symptoms of hyperglycemia and hypoglycemia  - Administer ordered medications to maintain glucose within target range  - Assess nutritional intake and initiate nutrition service referral as needed  Outcome: Progressing     Problem: HEMATOLOGIC - ADULT  Goal: Maintains hematologic stability  Description: INTERVENTIONS  - Assess for signs and symptoms of bleeding or hemorrhage  - Monitor labs  - Administer supportive blood products/factors as ordered and appropriate  Outcome: Progressing     Problem: PAIN - ADULT  Goal: Verbalizes/displays adequate comfort level or baseline comfort level  Description: Interventions:  - Encourage patient to monitor pain and request assistance  - Assess pain using appropriate pain scale  - Administer analgesics based on type and severity of pain and evaluate response  - Implement non-pharmacological measures as appropriate and evaluate response  - Consider cultural and social influences on pain and pain management  - Notify physician/advanced practitioner if interventions unsuccessful or patient reports new pain  Outcome: Progressing     Problem: INFECTION - ADULT  Goal: Absence or prevention of progression during hospitalization  Description: INTERVENTIONS:  - Assess and monitor for signs and symptoms of infection  - Monitor lab/diagnostic results  - Monitor all insertion sites, i e  indwelling lines, tubes, and drains  - Monitor endotracheal if appropriate and nasal secretions for changes in amount and color  - [place] appropriate cooling/warming therapies per order  - Administer medications as ordered  - Instruct and encourage patient and family to use good hand hygiene technique  - Identify and instruct in appropriate isolation precautions for identified infection/condition  Outcome: Progressing  Goal: Absence of fever/infection during neutropenic period  Description: INTERVENTIONS:  - Monitor WBC    Outcome: Progressing     Problem: SAFETY ADULT  Goal: Patient will remain free of falls  Description: INTERVENTIONS:  - Assess patient frequently for physical needs  -  Identify cognitive and physical deficits and behaviors that affect risk of falls    -  [place] fall precautions as indicated by assessment   - Educate patient/family on patient safety including physical limitations  - Instruct patient to call for assistance with activity based on assessment  - Modify environment to reduce risk of injury  - Consider OT/PT consult to assist with strengthening/mobility  Outcome: Progressing  Goal: Maintain or return to baseline ADL function  Description: INTERVENTIONS:  -  Assess patient's ability to carry out ADLs; assess patient's baseline for ADL function and identify physical deficits which impact ability to perform ADLs (bathing, care of mouth/teeth, toileting, grooming, dressing, etc )  - Assess/evaluate cause of self-care deficits   - Assess range of motion  - Assess patient's mobility; develop plan if impaired  - Assess patient's need for assistive devices and provide as appropriate  - Encourage maximum independence but intervene and supervise when necessary  - Involve family in performance of ADLs  - Assess for home care needs following discharge   - Consider OT consult to assist with ADL evaluation and planning for discharge  - Provide patient education as appropriate  Outcome: Progressing  Goal: Maintain or return mobility status to optimal level  Description: INTERVENTIONS:  - Assess patient's baseline mobility status (ambulation, transfers, stairs, etc )    - Identify cognitive and physical deficits and behaviors that affect mobility  - Identify mobility aids required to assist with transfers and/or ambulation (gait belt, sit-to-stand, lift, walker, cane, etc )  - [place] fall precautions as indicated by assessment  - Record patient progress and toleration of activity level on Mobility SBAR; progress patient to next Phase/Stage  - Instruct patient to call for assistance with activity based on assessment  - Consider rehabilitation consult to assist with strengthening/weightbearing, etc   Outcome: Progressing     Problem: DISCHARGE PLANNING  Goal: Discharge to home or other facility with appropriate resources  Description: INTERVENTIONS:  - Identify barriers to discharge w/patient and caregiver  - Arrange for needed discharge resources and transportation as appropriate  - Identify discharge learning needs (meds, wound care, etc )  - Arrange for interpretive services to assist at discharge as needed  - Refer to Case Management Department for coordinating discharge planning if the patient needs post-hospital services based on physician/advanced practitioner order or complex needs related to functional status, cognitive ability, or social support system  Outcome: Progressing     Problem: Knowledge Deficit  Goal: Patient/family/caregiver demonstrates understanding of disease process, treatment plan, medications, and discharge instructions  Description: Complete learning assessment and assess knowledge base    Interventions:  - Provide teaching at level of understanding  - Provide teaching via preferred learning methods  Outcome: Progressing

## 2020-08-15 ENCOUNTER — HOSPITAL ENCOUNTER (INPATIENT)
Facility: HOSPITAL | Age: 60
LOS: 2 days | Discharge: LEFT BEFORE DISCHARGE | DRG: 871 | End: 2020-08-17
Attending: EMERGENCY MEDICINE | Admitting: INTERNAL MEDICINE
Payer: MEDICARE

## 2020-08-15 DIAGNOSIS — B49 FUNGEMIA: Primary | ICD-10-CM

## 2020-08-15 PROBLEM — E87.2 LACTIC ACID ACIDOSIS: Status: RESOLVED | Noted: 2020-08-12 | Resolved: 2020-08-15

## 2020-08-15 PROBLEM — D64.9 CHRONIC ANEMIA: Status: ACTIVE | Noted: 2020-08-15

## 2020-08-15 PROBLEM — R65.21 SEPTIC SHOCK (HCC): Status: RESOLVED | Noted: 2020-08-12 | Resolved: 2020-08-15

## 2020-08-15 PROBLEM — A41.9 SEPTIC SHOCK (HCC): Status: RESOLVED | Noted: 2020-08-12 | Resolved: 2020-08-15

## 2020-08-15 PROBLEM — Z72.0 TOBACCO ABUSE: Status: ACTIVE | Noted: 2020-08-15

## 2020-08-15 LAB
ABO GROUP BLD BPU: NORMAL
ALBUMIN SERPL BCP-MCNC: 3 G/DL (ref 3.5–5)
ALP SERPL-CCNC: 143 U/L (ref 46–116)
ALT SERPL W P-5'-P-CCNC: 23 U/L (ref 12–78)
ANION GAP SERPL CALCULATED.3IONS-SCNC: 7 MMOL/L (ref 4–13)
APTT PPP: 39 SECONDS (ref 23–37)
AST SERPL W P-5'-P-CCNC: 27 U/L (ref 5–45)
BASOPHILS # BLD AUTO: 0.01 THOUSANDS/ΜL (ref 0–0.1)
BASOPHILS NFR BLD AUTO: 0 % (ref 0–1)
BILIRUB SERPL-MCNC: 0.6 MG/DL (ref 0.2–1)
BPU ID: NORMAL
BUN SERPL-MCNC: 9 MG/DL (ref 5–25)
CALCIUM SERPL-MCNC: 7.6 MG/DL (ref 8.3–10.1)
CHLORIDE SERPL-SCNC: 106 MMOL/L (ref 100–108)
CO2 SERPL-SCNC: 25 MMOL/L (ref 21–32)
CREAT SERPL-MCNC: 0.91 MG/DL (ref 0.6–1.3)
CROSSMATCH: NORMAL
EOSINOPHIL # BLD AUTO: 0.14 THOUSAND/ΜL (ref 0–0.61)
EOSINOPHIL NFR BLD AUTO: 3 % (ref 0–6)
ERYTHROCYTE [DISTWIDTH] IN BLOOD BY AUTOMATED COUNT: 20.4 % (ref 11.6–15.1)
GFR SERPL CREATININE-BSD FRML MDRD: 92 ML/MIN/1.73SQ M
GLUCOSE SERPL-MCNC: 88 MG/DL (ref 65–140)
HCT VFR BLD AUTO: 28.1 % (ref 36.5–49.3)
HGB BLD-MCNC: 8.6 G/DL (ref 12–17)
IMM GRANULOCYTES # BLD AUTO: 0.02 THOUSAND/UL (ref 0–0.2)
IMM GRANULOCYTES NFR BLD AUTO: 0 % (ref 0–2)
INR PPP: 1.38 (ref 0.84–1.19)
LACTATE SERPL-SCNC: 1 MMOL/L (ref 0.5–2)
LYMPHOCYTES # BLD AUTO: 0.93 THOUSANDS/ΜL (ref 0.6–4.47)
LYMPHOCYTES NFR BLD AUTO: 17 % (ref 14–44)
MCH RBC QN AUTO: 22.6 PG (ref 26.8–34.3)
MCHC RBC AUTO-ENTMCNC: 30.6 G/DL (ref 31.4–37.4)
MCV RBC AUTO: 74 FL (ref 82–98)
MONOCYTES # BLD AUTO: 0.26 THOUSAND/ΜL (ref 0.17–1.22)
MONOCYTES NFR BLD AUTO: 5 % (ref 4–12)
NEUTROPHILS # BLD AUTO: 3.99 THOUSANDS/ΜL (ref 1.85–7.62)
NEUTS SEG NFR BLD AUTO: 75 % (ref 43–75)
NRBC BLD AUTO-RTO: 0 /100 WBCS
PLATELET # BLD AUTO: 56 THOUSANDS/UL (ref 149–390)
POTASSIUM SERPL-SCNC: 4.1 MMOL/L (ref 3.5–5.3)
PROT SERPL-MCNC: 6.4 G/DL (ref 6.4–8.2)
PROTHROMBIN TIME: 16.8 SECONDS (ref 11.6–14.5)
RBC # BLD AUTO: 3.81 MILLION/UL (ref 3.88–5.62)
SODIUM SERPL-SCNC: 138 MMOL/L (ref 136–145)
UNIT DISPENSE STATUS: NORMAL
UNIT PRODUCT CODE: NORMAL
UNIT RH: NORMAL
WBC # BLD AUTO: 5.35 THOUSAND/UL (ref 4.31–10.16)

## 2020-08-15 PROCEDURE — 85610 PROTHROMBIN TIME: CPT | Performed by: EMERGENCY MEDICINE

## 2020-08-15 PROCEDURE — 85025 COMPLETE CBC W/AUTO DIFF WBC: CPT | Performed by: EMERGENCY MEDICINE

## 2020-08-15 PROCEDURE — 99285 EMERGENCY DEPT VISIT HI MDM: CPT

## 2020-08-15 PROCEDURE — 99285 EMERGENCY DEPT VISIT HI MDM: CPT | Performed by: EMERGENCY MEDICINE

## 2020-08-15 PROCEDURE — 80053 COMPREHEN METABOLIC PANEL: CPT | Performed by: EMERGENCY MEDICINE

## 2020-08-15 PROCEDURE — 83605 ASSAY OF LACTIC ACID: CPT | Performed by: EMERGENCY MEDICINE

## 2020-08-15 PROCEDURE — 87040 BLOOD CULTURE FOR BACTERIA: CPT | Performed by: EMERGENCY MEDICINE

## 2020-08-15 PROCEDURE — 85730 THROMBOPLASTIN TIME PARTIAL: CPT | Performed by: EMERGENCY MEDICINE

## 2020-08-15 PROCEDURE — 84145 PROCALCITONIN (PCT): CPT | Performed by: EMERGENCY MEDICINE

## 2020-08-15 PROCEDURE — 36415 COLL VENOUS BLD VENIPUNCTURE: CPT | Performed by: EMERGENCY MEDICINE

## 2020-08-15 RX ORDER — LACTULOSE 20 G/30ML
20 SOLUTION ORAL DAILY
Status: DISCONTINUED | OUTPATIENT
Start: 2020-08-16 | End: 2020-08-17 | Stop reason: HOSPADM

## 2020-08-15 RX ORDER — FLUCONAZOLE 2 MG/ML
400 INJECTION, SOLUTION INTRAVENOUS EVERY 24 HOURS
Status: DISCONTINUED | OUTPATIENT
Start: 2020-08-16 | End: 2020-08-15

## 2020-08-15 RX ORDER — ALBUTEROL SULFATE 2.5 MG/3ML
2.5 SOLUTION RESPIRATORY (INHALATION) EVERY 6 HOURS PRN
Status: DISCONTINUED | OUTPATIENT
Start: 2020-08-15 | End: 2020-08-16

## 2020-08-15 RX ORDER — ACETAMINOPHEN 325 MG/1
650 TABLET ORAL EVERY 6 HOURS PRN
Status: DISCONTINUED | OUTPATIENT
Start: 2020-08-15 | End: 2020-08-17 | Stop reason: HOSPADM

## 2020-08-15 RX ORDER — SPIRONOLACTONE 25 MG/1
50 TABLET ORAL DAILY
Status: DISCONTINUED | OUTPATIENT
Start: 2020-08-16 | End: 2020-08-17 | Stop reason: HOSPADM

## 2020-08-15 RX ORDER — NICOTINE 21 MG/24HR
1 PATCH, TRANSDERMAL 24 HOURS TRANSDERMAL DAILY
Status: DISCONTINUED | OUTPATIENT
Start: 2020-08-16 | End: 2020-08-17 | Stop reason: HOSPADM

## 2020-08-15 RX ORDER — HEPARIN SODIUM 5000 [USP'U]/ML
5000 INJECTION, SOLUTION INTRAVENOUS; SUBCUTANEOUS EVERY 8 HOURS SCHEDULED
Status: DISCONTINUED | OUTPATIENT
Start: 2020-08-15 | End: 2020-08-17 | Stop reason: HOSPADM

## 2020-08-15 RX ORDER — PANTOPRAZOLE SODIUM 40 MG/1
40 TABLET, DELAYED RELEASE ORAL DAILY
Status: DISCONTINUED | OUTPATIENT
Start: 2020-08-16 | End: 2020-08-17 | Stop reason: HOSPADM

## 2020-08-15 RX ORDER — FLUCONAZOLE 2 MG/ML
400 INJECTION, SOLUTION INTRAVENOUS ONCE
Status: COMPLETED | OUTPATIENT
Start: 2020-08-15 | End: 2020-08-16

## 2020-08-15 RX ORDER — LORAZEPAM 0.5 MG/1
0.5 TABLET ORAL
Status: DISCONTINUED | OUTPATIENT
Start: 2020-08-15 | End: 2020-08-17 | Stop reason: HOSPADM

## 2020-08-15 RX ORDER — PROPRANOLOL HYDROCHLORIDE 20 MG/1
20 TABLET ORAL DAILY
Status: DISCONTINUED | OUTPATIENT
Start: 2020-08-16 | End: 2020-08-17 | Stop reason: HOSPADM

## 2020-08-15 RX ORDER — FLUCONAZOLE 2 MG/ML
400 INJECTION, SOLUTION INTRAVENOUS EVERY 24 HOURS
Status: DISCONTINUED | OUTPATIENT
Start: 2020-08-15 | End: 2020-08-17 | Stop reason: HOSPADM

## 2020-08-15 RX ADMIN — FLUCONAZOLE 400 MG: 2 INJECTION, SOLUTION INTRAVENOUS at 23:51

## 2020-08-15 RX ADMIN — FLUCONAZOLE 400 MG: 2 INJECTION, SOLUTION INTRAVENOUS at 20:22

## 2020-08-15 RX ADMIN — HEPARIN SODIUM 5000 UNITS: 5000 INJECTION INTRAVENOUS; SUBCUTANEOUS at 23:51

## 2020-08-15 NOTE — RESULT ENCOUNTER NOTE
Spoke with patient  Advised him that he should return to the ED for likely re-admission as fungemia is highly concerning  He states that he will return tomorrow   I advised him to return ASAP however he is adamant that he has "things to do" and will return tomorrow

## 2020-08-16 PROBLEM — K70.31 ALCOHOLIC CIRRHOSIS OF LIVER WITH ASCITES (HCC): Status: ACTIVE | Noted: 2020-01-03

## 2020-08-16 LAB
ALBUMIN SERPL BCP-MCNC: 2.5 G/DL (ref 3.5–5)
ALP SERPL-CCNC: 132 U/L (ref 46–116)
ALT SERPL W P-5'-P-CCNC: 18 U/L (ref 12–78)
ANION GAP SERPL CALCULATED.3IONS-SCNC: 5 MMOL/L (ref 4–13)
ANISOCYTOSIS BLD QL SMEAR: PRESENT
AST SERPL W P-5'-P-CCNC: 18 U/L (ref 5–45)
BASOPHILS # BLD MANUAL: 0 THOUSAND/UL (ref 0–0.1)
BASOPHILS NFR MAR MANUAL: 0 % (ref 0–1)
BILIRUB SERPL-MCNC: 0.4 MG/DL (ref 0.2–1)
BUN SERPL-MCNC: 6 MG/DL (ref 5–25)
CALCIUM SERPL-MCNC: 7 MG/DL (ref 8.3–10.1)
CHLORIDE SERPL-SCNC: 110 MMOL/L (ref 100–108)
CO2 SERPL-SCNC: 24 MMOL/L (ref 21–32)
CREAT SERPL-MCNC: 0.71 MG/DL (ref 0.6–1.3)
DACRYOCYTES BLD QL SMEAR: PRESENT
EOSINOPHIL # BLD MANUAL: 0.02 THOUSAND/UL (ref 0–0.4)
EOSINOPHIL NFR BLD MANUAL: 1 % (ref 0–6)
ERYTHROCYTE [DISTWIDTH] IN BLOOD BY AUTOMATED COUNT: 20.5 % (ref 11.6–15.1)
GFR SERPL CREATININE-BSD FRML MDRD: 103 ML/MIN/1.73SQ M
GLUCOSE SERPL-MCNC: 85 MG/DL (ref 65–140)
HCT VFR BLD AUTO: 23.9 % (ref 36.5–49.3)
HGB BLD-MCNC: 7.3 G/DL (ref 12–17)
HYPERCHROMIA BLD QL SMEAR: PRESENT
LYMPHOCYTES # BLD AUTO: 0.42 THOUSAND/UL (ref 0.6–4.47)
LYMPHOCYTES # BLD AUTO: 23 % (ref 14–44)
MAGNESIUM SERPL-MCNC: 1.6 MG/DL (ref 1.6–2.6)
MCH RBC QN AUTO: 22.4 PG (ref 26.8–34.3)
MCHC RBC AUTO-ENTMCNC: 30.5 G/DL (ref 31.4–37.4)
MCV RBC AUTO: 73 FL (ref 82–98)
MICROCYTES BLD QL AUTO: PRESENT
MONOCYTES # BLD AUTO: 0 THOUSAND/UL (ref 0–1.22)
MONOCYTES NFR BLD: 0 % (ref 4–12)
NEUTROPHILS # BLD MANUAL: 1.4 THOUSAND/UL (ref 1.85–7.62)
NEUTS SEG NFR BLD AUTO: 76 % (ref 43–75)
NRBC BLD AUTO-RTO: 0 /100 WBCS
OVALOCYTES BLD QL SMEAR: PRESENT
PLATELET # BLD AUTO: 42 THOUSANDS/UL (ref 149–390)
PLATELET BLD QL SMEAR: ABNORMAL
POTASSIUM SERPL-SCNC: 3.6 MMOL/L (ref 3.5–5.3)
PROCALCITONIN SERPL-MCNC: 20.1 NG/ML
PROCALCITONIN SERPL-MCNC: 28.04 NG/ML
PROT SERPL-MCNC: 5.4 G/DL (ref 6.4–8.2)
RBC # BLD AUTO: 3.26 MILLION/UL (ref 3.88–5.62)
RBC MORPH BLD: PRESENT
SCHISTOCYTES BLD QL SMEAR: PRESENT
SODIUM SERPL-SCNC: 139 MMOL/L (ref 136–145)
TOTAL CELLS COUNTED SPEC: 100
WBC # BLD AUTO: 1.84 THOUSAND/UL (ref 4.31–10.16)

## 2020-08-16 PROCEDURE — 85027 COMPLETE CBC AUTOMATED: CPT | Performed by: NURSE PRACTITIONER

## 2020-08-16 PROCEDURE — 92610 EVALUATE SWALLOWING FUNCTION: CPT

## 2020-08-16 PROCEDURE — 83735 ASSAY OF MAGNESIUM: CPT | Performed by: NURSE PRACTITIONER

## 2020-08-16 PROCEDURE — 80053 COMPREHEN METABOLIC PANEL: CPT | Performed by: NURSE PRACTITIONER

## 2020-08-16 PROCEDURE — 84145 PROCALCITONIN (PCT): CPT | Performed by: EMERGENCY MEDICINE

## 2020-08-16 PROCEDURE — 85007 BL SMEAR W/DIFF WBC COUNT: CPT | Performed by: NURSE PRACTITIONER

## 2020-08-16 PROCEDURE — 99233 SBSQ HOSP IP/OBS HIGH 50: CPT | Performed by: INTERNAL MEDICINE

## 2020-08-16 PROCEDURE — 87040 BLOOD CULTURE FOR BACTERIA: CPT | Performed by: NURSE PRACTITIONER

## 2020-08-16 PROCEDURE — 94640 AIRWAY INHALATION TREATMENT: CPT

## 2020-08-16 PROCEDURE — 94760 N-INVAS EAR/PLS OXIMETRY 1: CPT

## 2020-08-16 RX ORDER — LEVALBUTEROL 1.25 MG/.5ML
1.25 SOLUTION, CONCENTRATE RESPIRATORY (INHALATION)
Status: DISCONTINUED | OUTPATIENT
Start: 2020-08-16 | End: 2020-08-17 | Stop reason: HOSPADM

## 2020-08-16 RX ORDER — IPRATROPIUM BROMIDE AND ALBUTEROL SULFATE 2.5; .5 MG/3ML; MG/3ML
3 SOLUTION RESPIRATORY (INHALATION)
Status: DISCONTINUED | OUTPATIENT
Start: 2020-08-16 | End: 2020-08-16

## 2020-08-16 RX ORDER — OXYCODONE HYDROCHLORIDE 5 MG/1
10 TABLET ORAL EVERY 8 HOURS PRN
Status: DISCONTINUED | OUTPATIENT
Start: 2020-08-16 | End: 2020-08-17 | Stop reason: HOSPADM

## 2020-08-16 RX ADMIN — LEVALBUTEROL HYDROCHLORIDE 1.25 MG: 1.25 SOLUTION, CONCENTRATE RESPIRATORY (INHALATION) at 09:04

## 2020-08-16 RX ADMIN — FLUCONAZOLE 400 MG: 2 INJECTION, SOLUTION INTRAVENOUS at 23:12

## 2020-08-16 RX ADMIN — LORAZEPAM 0.5 MG: 0.5 TABLET ORAL at 01:33

## 2020-08-16 RX ADMIN — LORAZEPAM 0.5 MG: 0.5 TABLET ORAL at 21:48

## 2020-08-16 RX ADMIN — SPIRONOLACTONE 50 MG: 25 TABLET ORAL at 08:52

## 2020-08-16 RX ADMIN — HEPARIN SODIUM 5000 UNITS: 5000 INJECTION INTRAVENOUS; SUBCUTANEOUS at 13:35

## 2020-08-16 RX ADMIN — OXYCODONE HYDROCHLORIDE 10 MG: 5 TABLET ORAL at 08:52

## 2020-08-16 RX ADMIN — OXYCODONE HYDROCHLORIDE 10 MG: 5 TABLET ORAL at 17:07

## 2020-08-16 RX ADMIN — IPRATROPIUM BROMIDE 0.5 MG: 0.5 SOLUTION RESPIRATORY (INHALATION) at 09:04

## 2020-08-16 RX ADMIN — HEPARIN SODIUM 5000 UNITS: 5000 INJECTION INTRAVENOUS; SUBCUTANEOUS at 21:49

## 2020-08-16 RX ADMIN — HEPARIN SODIUM 5000 UNITS: 5000 INJECTION INTRAVENOUS; SUBCUTANEOUS at 06:19

## 2020-08-16 RX ADMIN — PROPRANOLOL HYDROCHLORIDE 20 MG: 20 TABLET ORAL at 08:52

## 2020-08-16 RX ADMIN — PANTOPRAZOLE SODIUM 40 MG: 40 TABLET, DELAYED RELEASE ORAL at 06:26

## 2020-08-16 RX ADMIN — NICOTINE POLACRILEX 2 MG: 2 GUM, CHEWING BUCCAL at 17:07

## 2020-08-16 NOTE — H&P
H&P- Alex Perry 1960, 62 y o  male MRN: [id number]    Unit/Bed#: -01 Encounter: [id number]    Primary Care Provider: Dawson Duvall   Date and time admitted to hospital: 8/15/2020  7:56 PM        * Fungemia  Assessment & Plan  · Patient signed out [facility] 8/14 after presenting for sepsis on 08/11 requiring pressors, initial bcx from 8/11 negative x 72 hrs, repeat bcx from 8/12 + for Candida albicans 2/2 - patient was called back to come in for IV treatment & admission  · Upon admission vital signs stable patient received fluconazole  mg will give additional 400mg  for initial load then continue 400 mg daily  · ID consulted  · VSS  · No obvious source for fungemia  · Patient denies IV drug use  · Recent TLC placement however, bcx obtained prior to insertion   · Will obtain echocardiogram  · Trend white blood cells and temperature  · Procalcitonin on 08/14 was 95, pending this admission  · Repeat blood cultures obtained in the ED, will schedule another set with AM labs   · Imaging from recent admission includes chest x-ray which was negative for any findings  Alcoholic cirrhosis ([facility] [address] )  Assessment & Plan  · Ammonia on recent labs < 10   · Underwent a paracentesis at the end of last year with 2 L of fluid removed  Attempted para in July 2020 & most recent admission but there was not enough fluid   · Lactulose daily   · Patient has propranolol & aldactone listed but states that he does not take either  · Trend LFTs - currently acceptable   · INR 1 38  · MELD score 10  · Follows with GI as outpt  HTN (hypertension)  Assessment & Plan  · Would continue propranolol & aldactone although patient states he does not take either medication    Esophageal varices determined by endoscopy [facility])  Assessment & Plan  · Continue protonix   · Last banding in June 2020  · Follows with GI as outpt    · Trend CBC     Thrombocytopenia (HCC)  Assessment & Plan  · Plt 56  · Trend CBC daily  · Transfuse for Plt < 20    Tobacco abuse  Assessment & Plan  · Discussed smoking cessation   · Nicotine patch     Chronic anemia  Assessment & Plan  · Hgb 8 6 on yesterdays labs  · Trend CBC daily  · Tranfuse for Hgb < 7 0   · Recent EGD on 6/20    History of substance abuse ([facility] )  Assessment & Plan  · Denies current use of illegal substances or ETOH x 15 yrs    Esophageal dysphagia  Assessment & Plan  · Will place on soft diet      History and Physical - [facility] Internal Medicine    Patient Information: Nuria Henry 62 y o  male MRN: [id number]  Unit/Bed#: -01 Encounter: [id number]  Admitting Physician: Luis Rodríguez  PCP: Juliane Funes  Date of Admission:  08/15/20    Assessment/Plan:    Hospital Problem List:     Principal Problem:    Fungemia  Active Problems:    Alcoholic cirrhosis ([facility] )    HTN (hypertension)    Thrombocytopenia ([place] [address] )    Esophageal varices determined by endoscopy ([place] [address] )    Esophageal dysphagia    History of substance abuse ([place] [address] )    Chronic anemia    Tobacco abuse      VTE Prophylaxis: Heparin  / sequential compression device   Code Status: FC  POLST: There is no POLST form on file for this patient (pre-hospital)    Anticipated Length of Stay:  Patient will be admitted on an Inpatient basis with an anticipated length of stay of  2 midnights  Justification for Hospital Stay: Fungemia     Total Time for Visit, including Counseling / Coordination of Care: 20 minutes  Greater than 50% of this total time spent on direct patient counseling and coordination of care  Chief Complaint:   Positive bcx     History of Present Illness:    Nuria Henry is a 62 y o  male who presents with PMH of liver cirrhosis due to alcohol abuse with ascites and esophageal varices, hypertension, chronic anemia, thrombocytopenia and tobacco abuse  Patient was recently admitted and treated for septic shock which after resolution of symptoms and hypotension he decided to sign out against medical advice on 08/14    His repeat blood cultures came back positive with Candida albicans 2/2 sets  He was called by the emergency department physician to come to the hospital for treatment  The patient came back this evening, was treated with fluconazole, initial IV bolus of 800 mg followed by 400 mg daily  Repeat blood cultures were obtained, infectious disease was consulted and echo is pending  He denies any IV drug use  Vitals signs were stable, the patient was admitted to the MS unit  Review of Systems:    Review of Systems   Constitutional: Negative  HENT: Positive for voice change  Eyes: Negative  Respiratory: Negative  Cardiovascular: Negative  Gastrointestinal: Negative  Endocrine: Negative  Genitourinary: Negative  Musculoskeletal: Negative  Skin: Negative  Allergic/Immunologic: Negative  Neurological: Negative  Hematological: Negative  Psychiatric/Behavioral: Negative          Past Medical and Surgical History:     Past Medical History:   Diagnosis Date    Ascites     Cardiac disease     Chronic left-sided headaches     Chronic pain     back and neck    Chronic pain disorder     COPD (chronic obstructive pulmonary disease) (HCC)     Esophageal varices in cirrhosis (HCC)     History of transfusion     Hypertension     Hypokalemia 1/3/2020       Past Surgical History:   Procedure Laterality Date    BACK SURGERY      CERVICAL FUSION      CHOLECYSTECTOMY      COLONOSCOPY  11/23/2019    Internal external hemorrhoids, nonbleeding rectal varices    CORONARY ANGIOPLASTY WITH STENT PLACEMENT      2006-PTCA/STENT to mid and distal RCA; then 2009 Left-LAD normal, circumflex-normal,Proximal % stnosis, Chronic total occlusion    EGD  01/11/2006    GALLBLADDER SURGERY      HERNIA REPAIR      IR PARACENTESIS  7/15/2020    IR PARACENTESIS  8/13/2020    JOINT REPLACEMENT Left     knee    KNEE SURGERY      NECK SURGERY      PARACENTESIS      [facility]    UPPER GASTROINTESTINAL ENDOSCOPY  01/11/2006    Gastritis and duodenitis  Pathology negative for any abnormality    UPPER GASTROINTESTINAL ENDOSCOPY  11/23/2019    Grade 3 esophageal varices and portal hypertensive gastropathy       Meds/Allergies:    Prior to Admission medications    Medication Sig Start Date End Date Taking? Authorizing Provider   albuterol (2 5 mg/3 mL) 0 083 % nebulizer solution Take 2 5 mg by nebulization every 6 (six) hours as needed for wheezing or shortness of breath   Yes Historical Provider, MD   albuterol (PROVENTIL HFA,VENTOLIN HFA) 90 mcg/act inhaler Inhale 2 puffs as needed for shortness of breath   Yes Historical Provider, MD   pantoprazole (PROTONIX) 40 mg tablet Take 1 tablet (40 mg total) by mouth daily 6/17/20  Yes Rogelio Rivera MD   lactulose 20 g/30 mL Take 30 mL (20 g total) by mouth 3 (three) times a day 7/8/20 8/12/20  ROXANNE Stevens   propranolol (INDERAL) 20 mg tablet Take 1 tablet (20 mg total) by mouth daily  Patient not taking: Reported on 8/15/2020 5/7/20   ROXANNE Nobles   spironolactone (ALDACTONE) 50 mg tablet Take 1 tablet (50 mg total) by mouth daily  Patient not taking: Reported on 8/15/2020 7/31/20 10/29/20  ROXANNE Stevens     I have reviewed home medications with patient personally  Allergies:    Allergies   Allergen Reactions    Flexeril [Cyclobenzaprine] Hallucinations           Morphine And Related Hives    Naprosyn [Naproxen] GI Intolerance    Ultram [Tramadol] GI Intolerance       Social History:     Marital Status: /Civil Union     Substance Use History:   Social History     Substance and Sexual Activity   Alcohol Use Not Currently     Social History     Tobacco Use   Smoking Status Current Every Day Smoker    Packs/day: 3 50    Years: 45 00    Pack years: 157 50    Types: Cigarettes   Smokeless Tobacco Never Used   Tobacco Comment    encouraged smoking cessation     Social History     Substance and Sexual Activity   Drug Use No Family History:    Family History   Problem Relation Age of Onset    Heart disease Father     Heart disease Brother     Cardiomyopathy Brother     Colon polyps Neg Hx     Colon cancer Neg Hx        Physical Exam:     Vitals:   Blood Pressure: 104/61 (08/15/20 2100)  Pulse: 64 (08/15/20 2100)  Temperature: 97 8 °F (36 6 °C) (08/15/20 2110)  Temp Source: Temporal (08/15/20 1959)  Respirations: 19 (08/15/20 2048)  Height: 5' 10" (177 8 cm) (08/15/20 2158)  Weight - Scale: 68 4 kg (150 lb 14 4 oz) (08/15/20 2158)  SpO2: 97 % (08/15/20 2100)    Physical Exam  HENT:      Head: Normocephalic and atraumatic  Nose: Nose normal       Mouth/Throat:      Mouth: Mucous membranes are moist       Pharynx: Oropharynx is clear  Eyes:      Pupils: Pupils are equal, round, and reactive to light  Neck:      Musculoskeletal: Normal range of motion  Cardiovascular:      Rate and Rhythm: Normal rate and regular rhythm  Pulses: Normal pulses  Heart sounds: Normal heart sounds  Pulmonary:      Effort: No respiratory distress  Abdominal:      General: Bowel sounds are normal  There is no distension  Tenderness: There is no abdominal tenderness  Musculoskeletal: Normal range of motion  Skin:     General: Skin is warm and dry  Neurological:      General: No focal deficit present  Mental Status: He is alert and oriented to person, place, and time  Psychiatric:         Mood and Affect: Mood normal          Thought Content: Thought content normal          Additional Data:     Lab Results: I have personally reviewed pertinent reports        Results from last 7 days   Lab Units 08/15/20  2012   WBC Thousand/uL 5 35   HEMOGLOBIN g/dL 8 6*   HEMATOCRIT % 28 1*   PLATELETS Thousands/uL 56*   NEUTROS PCT % 75   LYMPHS PCT % 17   MONOS PCT % 5   EOS PCT % 3     Results from last 7 days   Lab Units 08/15/20  2012   POTASSIUM mmol/L 4 1   CHLORIDE mmol/L 106   CO2 mmol/L 25   BUN mg/dL 9   CREATININE mg/dL 0 91   CALCIUM mg/dL 7 6*   ALK PHOS U/L 143*   ALT U/L 23   AST U/L 27     Results from last 7 days   Lab Units 08/15/20  2012   INR  1 38*       Imaging: I have personally reviewed pertinent reports  Xr Chest Portable    Result Date: 8/13/2020  Narrative: CHEST INDICATION:   suspect pna  COMPARISON:  August 12, 2020 EXAM PERFORMED/VIEWS:  XR CHEST PORTABLE FINDINGS:  Left IJ catheter terminates in SVC  Cardiomediastinal silhouette appears unremarkable  The lungs are clear  No pneumothorax or pleural effusion  Osseous structures appear within normal limits for patient age  Impression: No evidence of pneumonia or other acute abnormality in the chest  Workstation performed: [id number]     Xr Chest 2 Views    Result Date: 8/12/2020  Narrative: CHEST INDICATION:   sepsis  COMPARISON:  9/11/2009 EXAM PERFORMED/VIEWS:  XR CHEST PA & LATERAL FINDINGS: Heart shadow appears unremarkable  Atherosclerotic vascular calcifications are noted  Mildly prominent chronic interstitial lung markings  No consolidation  No pneumothorax or pleural effusion  Osseous structures appear within normal limits for patient age  Impression: No acute cardiopulmonary disease  Workstation performed: [id number]     Ir Paracentesis    Result Date: 8/13/2020  Narrative: ABDOMEN ULTRASOUND, LIMITED, FOUR QUADRANT SURVEY INDICATION:    Suspected spontaneous bacterial peritonitis  COMPARISON:  None  TECHNIQUE: Real-time ultrasound of the abdominal cavity was performed with a curvilinear transducer with standard grey scale imaging techniques  Impression: FINDINGS / IMPRESSION: Ultrasound evaluation of the was performed with the intent of identifying a pocket of ascites that would be sufficient for diagnostic paracentesis in this patient with suspected spontaneous bacterial peritonitis   Although trace ascites was identified in the left upper quadrant, left lower quadrant, and right lower quadrant, there was unfortunately too small volume to allow for safe attempt at paracentesis  I personally discussed this study with Price Quiroz on 8/13/2020 at 12:15 AM  Workstation performed: [id number]     Xr Chest Portable Icu    Result Date: 8/13/2020  Narrative: CHEST INDICATION:   Line insertion  COMPARISON:  August 12, 2020 EXAM PERFORMED/VIEWS:  XR CHEST PORTABLE ICU FINDINGS:  A left IJ catheter has been placed with its tip in the SVC  Cardiomediastinal silhouette appears unremarkable  The lungs are clear  No pneumothorax or pleural effusion  Osseous structures appear within normal limits for patient age  Impression: 1  IJ catheter extends into the SVC  2   No evidence of pneumothorax or other acute abnormality in the chest  Workstation performed: [id number]       EKG, Pathology, and Other Studies Reviewed on Admission:   · EKG: SR     Allscripts / Epic Records Reviewed: Yes     ** Please Note: This note has been constructed using a voice recognition system   **

## 2020-08-16 NOTE — RESPIRATORY THERAPY NOTE
RT Protocol Note  Joseph Scott 62 y o  male MRN: [id number]  Unit/Bed#: -01 Encounter: [id number]    Assessment    Principal Problem:    Fungemia  Active Problems:    HTN (hypertension)    Alcoholic cirrhosis (HCC)    Thrombocytopenia (HCC)    Esophageal varices determined by endoscopy ([place] [address] )    Esophageal dysphagia    History of substance abuse ([place] [address] )    Chronic anemia    Tobacco abuse      Home Pulmonary Medications:Albuterol prn       Past Medical History:   Diagnosis Date    Ascites     Cardiac disease     Chronic left-sided headaches     Chronic pain     back and neck    Chronic pain disorder     COPD (chronic obstructive pulmonary disease) (HCC)     Esophageal varices in cirrhosis ([place] [address] )     History of transfusion     Hypertension     Hypokalemia 1/3/2020     Social History     Socioeconomic History    Marital status: /Civil Union     Spouse name: None    Number of children: None    Years of education: None    Highest education level: None   Occupational History    None   Social Needs    Financial resource strain: None    Food insecurity     Worry: Never true     Inability: Never true    Transportation needs     Medical: No     Non-medical: No   Tobacco Use    Smoking status: Current Every Day Smoker     Packs/day: 3 50     Years: 45 00     Pack years: 157 50     Types: Cigarettes    Smokeless tobacco: Never Used    Tobacco comment: encouraged smoking cessation   Substance and Sexual Activity    Alcohol use: Not Currently    Drug use: No    Sexual activity: None   Lifestyle    Physical activity     Days per week: 0 days     Minutes per session: 0 min    Stress: Rather much   Relationships    Social connections     Talks on phone: None     Gets together: None     Attends Scientologist service: None     Active member of club or organization: None     Attends meetings of clubs or organizations: None     Relationship status: None    Intimate partner violence     Fear of current or ex partner: None     Emotionally abused: None     Physically abused: None     Forced sexual activity: None   Other Topics Concern    None   Social History Narrative    None       Subjective         Objective    Physical Exam:   Assessment Type: (P) Assess only  General Appearance: (P) Awake  Respiratory Pattern: (P) Normal  Chest Assessment: (P) Chest expansion symmetrical, Trachea midline  Bilateral Breath Sounds: (P) Coarse  Cough: (P) Non-productive    Vitals:  Blood pressure 104/61, pulse 66, temperature 97 8 °F (36 6 °C), resp  rate (P) 18, height 5' 10" (1 778 m), weight 68 4 kg (150 lb 14 4 oz), SpO2 (P) 96 %  Imaging and other studies: I have personally reviewed pertinent reports              Plan    Respiratory Plan: (P) No distress/Pulmonary history

## 2020-08-16 NOTE — ASSESSMENT & PLAN NOTE
Patient has pancytopenia which is chronic  His neutropenia is worse, could be due to candidemia bacteremia      Continue IV fluconazole    Denies signs of bleeding    Will continue monitoring blood counts

## 2020-08-16 NOTE — PROGRESS NOTES
Progress Note Mega Meredith 1960, 62 y o  male MRN: [id number]    Unit/Bed#: -01 Encounter: [id number]    Primary Care Provider: Bryan Reeves   Date and time admitted to hospital: 8/15/2020  7:56 PM        * Fungemia  Assessment & Plan  Patient presented to once on August 11th, 2020 with fever and met criteria for sepsis, he was hypotensive, had septic shock requiring IV fluids and IV pressors for couple of hours via central line  Blood cultures on August 11th showed no growth  Blood cultures from August 12th grow Candida albicans  Apparently central line was planes before blood cultures from August 12th were drawn  Patient has signed AMA then came back after a phone call that he has positive blood cultures  Patient continued to have fevers as high as 103 at home  Repeat blood cultures are pending, patient is on IV fluconazole  I asked Infectious Disease to see the patient    Echo was ordered to look for vegetations    He requires > 2 midnights hospital stay      Alcoholic cirrhosis of liver with ascites [facility])  Assessment & Plan  Patient has alcoholic cirrhosis with esophageal varices and trace ascites that could not be tapped on August 13th by IR  Will continue lactulose, Aldactone and Inderal    Pancytopenia [facility])  Assessment & Plan  Patient has pancytopenia which is chronic  His neutropenia is worse, could be due to candidemia bacteremia  Continue IV fluconazole    Denies signs of bleeding    Will continue monitoring blood counts    Esophageal varices determined by endoscopy [facility])  Assessment & Plan  Patient has chronic dysphagia due to esophageal varices, will change diet to surgical soft  Continue Inderal    Opioid dependence ([address] )  Assessment & Plan  Patient has chronic back pain and is taking oxycodone 10 mg 3 times a day as needed  I confirmed in the [place] PDMP system    Will resume oxycodone    COPD (chronic obstructive pulmonary disease) [facility])  Assessment & Plan  Patient is a smoker and has chronic COPD  Continue nicotine comes for smoking cessation    I placed him on DuoNeb nebulizer since patient has expiratory wheezing        VTE Prophylaxis: in place    Patient Centered Rounds: I rounded with patient's nurse    Current Length of Stay: 1 day(s)    Current Patient Status: Inpatient    Certification Statement: Pt requires additional inpatient hospital stay due to: see assessment and plan        Subjective: This morning patient denies any headache, chest pain, shortness breath, cough, abdominal pain, dysuria, no rash, joint swelling, joint aches, focal weakness or numbness  He had fevers as high as 103 blood couple of days he was at home  He has chronic back pain which is at baseline it is not worse than usual   He has several loose bowel movements on lactulose without signs of bleeding  Denies hematuria  All other ROS are negative    Objective:     Vitals:   Temp (24hrs), Av °F (36 7 °C), Min:97 7 °F (36 5 °C), Max:98 6 °F (37 °C)    Temp:  [97 7 °F (36 5 °C)-98 6 °F (37 °C)] 98 6 °F (37 °C)  HR:  [64-74] 74  Resp:  [16-19] 16  BP: (104-119)/(61-65) 106/62  SpO2:  [95 %-97 %] 95 %  Body mass index is 21 65 kg/m²  Input and Output Summary (last 24 hours): Intake/Output Summary (Last 24 hours) at 2020 0834  Last data filed at 2020 0601  Gross per 24 hour   Intake --   Output 600 ml   Net -600 ml       Physical Exam:     Physical Exam  HENT:      Head: Normocephalic  Mouth/Throat:      Pharynx: No oropharyngeal exudate  Eyes:      Conjunctiva/sclera: Conjunctivae normal    Neck:      Musculoskeletal: Neck supple  Cardiovascular:      Rate and Rhythm: Normal rate and regular rhythm  Heart sounds: No murmur  Pulmonary:      Effort: No respiratory distress  Breath sounds: Wheezing (Mild expiratory wheezing is present) present  No rales     Abdominal:      General: Bowel sounds are normal  There is distension ( patient has mild abdominal distension)  Tenderness: There is no abdominal tenderness  Musculoskeletal:         General: No tenderness  Skin:     General: Skin is warm and dry  Comments: I saw no ulcers, petechiae, cellulitis, edema   Neurological:      Mental Status: He is alert and oriented to person, place, and time  Cranial Nerves: No cranial nerve deficit  Comments: Patient has no asterixis or tremors   Psychiatric:         Mood and Affect: Mood normal              I personally reviewed labs and imaging reports for today  Last 24 Hours Medication List:   Current Facility-Administered Medications   Medication Dose Route Frequency Provider Last Rate    acetaminophen  650 mg Oral Q6H PRN Saddie Puller, CRNP      fluconazole  400 mg Intravenous Q24H Saddie Puller, CRNP 400 mg (08/15/20 2331)    heparin (porcine)  5,000 Units Subcutaneous Q8H [address] ROXANNE Alejandre      ipratropium-albuterol  3 mL Nebulization TID Lenox Alpers, MD      lactulose  20 g Oral Daily Saddie Puller, CRNP      LORazepam  0 5 mg Oral HS PRN Saddie Puller, CRNP      nicotine  1 patch Transdermal Daily Saddie Puller, CRNP      nicotine polacrilex  2 mg Oral Q2H PRN Saddie Puller, CRNP      oxyCODONE  10 mg Oral Q8H PRN Lenox Alpers, MD      pantoprazole  40 mg Oral Daily Saddie Puller, CRNP      propranolol  20 mg Oral Daily Saddie Puller, CRNP      spironolactone  50 mg Oral Daily Saddie Puller, CRNP            Today, Patient Was Seen By: Lenox Alpers, MD    ** Please Note: Dictation voice to text software may have been used in the creation of this document   **

## 2020-08-16 NOTE — ASSESSMENT & PLAN NOTE
Patient has chronic dysphagia due to esophageal varices, will change diet to surgical soft      Continue Inderal

## 2020-08-16 NOTE — ASSESSMENT & PLAN NOTE
Patient has chronic back pain and is taking oxycodone 10 mg 3 times a day as needed  I confirmed in the [place] PDMP system    Will resume oxycodone

## 2020-08-16 NOTE — CONSULTS
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, not entirely clear where he got it from, but suspect it is real, I guess it could of got translocated during his EGD, would be interested to know if it showed candidiasis, though I have not heard of that happening in the past   Anyway he is afebrile, he has no localizing symptoms, his repeat blood cultures are negative thus far  Not clear if they were drawn prior to or after starting fluconazole will need to find out  - echo pending  - await repeat cultures  - cont IV fluconazole for now  - if everything else comes back ok will be able to d/c on po fluconazole its bioavailability is the same whether given IV or PO, so would avoid picc line if possible  History of Present Illness   Physician Requesting Consult: Sheeba Freeman MD  Reason for Consult / Principal Problem: fungemia    HPI:  63 yo male with h/o etoh cirrhosis, states he has not drank in 20 years, sounds like he is non-compliant with the management of his cirrhosis  Patient underwent EGD on the 5th of this month, findings unclear, but left post-procedure AMA after arguing with staff  Patient had fevers and came to ER on the 11th those blood cultures were negative  He returned on the 12th because he did not feel well  He was hypotensive and was rx with sepsis rx, he received broad spectrum abx  He left AMA on the 14th  The cultures from the 12th came back + for C  Albicans  He was called back to the hospital last night  Repeat cultures were drawn and he was started on fluconazole  He has been afebrile now  He denies headaches, chest pains, abd pains, n/v/d  No signs of ssti, doubt sbp, he has dysphagia, etiology not clear  His cxr and ua were unremarkable  He denies IVDA  Consults    ROS: 12 systems reviewed, remainder is neg      Historical Information   Past Medical History:   Diagnosis Date    Ascites     Cardiac disease     Chronic left-sided headaches     Chronic pain     back and neck    Chronic pain disorder     COPD (chronic obstructive pulmonary disease) (HCC)     Esophageal varices in cirrhosis (HCC)     History of transfusion     Hypertension     Hypokalemia 1/3/2020     Past Surgical History:   Procedure Laterality Date    BACK SURGERY      CERVICAL FUSION      CHOLECYSTECTOMY      COLONOSCOPY  11/23/2019    Internal external hemorrhoids, nonbleeding rectal varices    CORONARY ANGIOPLASTY WITH STENT PLACEMENT      2006-PTCA/STENT to mid and distal RCA; then 2009 Left-LAD normal, circumflex-normal,Proximal % stnosis, Chronic total occlusion    EGD  01/11/2006    GALLBLADDER SURGERY      HERNIA REPAIR      IR PARACENTESIS  7/15/2020    IR PARACENTESIS  8/13/2020    JOINT REPLACEMENT Left     knee    KNEE SURGERY      NECK SURGERY      PARACENTESIS      [facility]    UPPER GASTROINTESTINAL ENDOSCOPY  01/11/2006    Gastritis and duodenitis    Pathology negative for any abnormality    UPPER GASTROINTESTINAL ENDOSCOPY  11/23/2019    Grade 3 esophageal varices and portal hypertensive gastropathy     Social History   Social History     Substance and Sexual Activity   Alcohol Use Not Currently     Social History     Substance and Sexual Activity   Drug Use No     Social History     Tobacco Use   Smoking Status Current Every Day Smoker    Packs/day: 3 50    Years: 45 00    Pack years: 157 50    Types: Cigarettes   Smokeless Tobacco Never Used   Tobacco Comment    encouraged smoking cessation     Family History   Problem Relation Age of Onset    Heart disease Father     Heart disease Brother     Cardiomyopathy Brother     Colon polyps Neg Hx     Colon cancer Neg Hx        Meds/Allergies   MEDS: reviewed      Current Facility-Administered Medications:     acetaminophen (TYLENOL) tablet 650 mg, 650 mg, Oral, Q6H PRN, Verl Beam, ROXANNE    fluconazole (DIFLUCAN) IVPB (premix) 400 mg 200 mL, 400 mg, Intravenous, Q24H, ROXANNE Alejandre, Last Rate: 100 mL/hr at 08/15/20 2351, 400 mg at 08/15/20 2351    heparin (porcine) subcutaneous injection 5,000 Units, 5,000 Units, Subcutaneous, Q8H [facility], ROXANNE Raines, 5,000 Units at 08/16/20 1166    ipratropium (ATROVENT) 0 02 % inhalation solution 0 5 mg, 0 5 mg, Nebulization, TID, Sheeba Freeman MD, 0 5 mg at 08/16/20 0904    lactulose 20 g/30 mL oral solution 20 g, 20 g, Oral, Daily, ROXANNE Raines    levalbuterol (XOPENEX) inhalation solution 1 25 mg, 1 25 mg, Nebulization, TID, Sheeba Freeman MD, 1 25 mg at 08/16/20 0904    LORazepam (ATIVAN) tablet 0 5 mg, 0 5 mg, Oral, HS PRN, ROXANNE Raines, 0 5 mg at 08/16/20 0133    nicotine (NICODERM CQ) 21 mg/24 hr TD 24 hr patch 1 patch, 1 patch, Transdermal, Daily, ROXANNE Raines    nicotine polacrilex (NICORETTE) gum 2 mg, 2 mg, Oral, Q2H PRN, ROXANNE Raines    oxyCODONE (ROXICODONE) IR tablet 10 mg, 10 mg, Oral, Q8H PRN, Sheeba Freeman MD, 10 mg at 08/16/20 0852    pantoprazole (PROTONIX) EC tablet 40 mg, 40 mg, Oral, Daily, ROXANNE Alejandre, 40 mg at 08/16/20 1144    propranolol (INDERAL) tablet 20 mg, 20 mg, Oral, Daily, ROXANNE Alejandre, 20 mg at 08/16/20 1078    spironolactone (ALDACTONE) tablet 50 mg, 50 mg, Oral, Daily, ROXANNE Alejandre, 50 mg at 08/16/20 1621    Allergies   Allergen Reactions    Flexeril [Cyclobenzaprine] Hallucinations           Morphine And Related Hives    Naprosyn [Naproxen] GI Intolerance    Ultram [Tramadol] GI Intolerance         Intake/Output Summary (Last 24 hours) at 8/16/2020 1235  Last data filed at 8/16/2020 0601  Gross per 24 hour   Intake --   Output 600 ml   Net -600 ml       PE:  WD, WN, WF in NAD  VSS, Tmax: afebrile  HEENT: anicteric, omm  NECK: supple no adenopathy  CARDIAC: rrr s1s2  LUNGS: cta bilaterally  ABDOMEN: soft nt/nd + BS  EXTREMITIES: no edema  SKIN: no rashes  NEURO: grossly non-focal  JOINTS: full ROM, no pain or warmth  PSYCH: nl affect    Invasive Devices:   Peripheral IV 08/15/20 Right Forearm (Active)   Site Assessment Clean;Dry; Intact 08/15/20 2200   Dressing Type Transparent 08/15/20 2200   Line Status Flushed; Infusing 08/15/20 2200   Dressing Status Clean;Dry; Intact 08/15/20 2200           Lab Results:   Admission on 08/15/2020   Component Date Value    WBC 08/15/2020 5 35     RBC 08/15/2020 3 81*    Hemoglobin 08/15/2020 8 6*    Hematocrit 08/15/2020 28 1*    MCV 08/15/2020 74*    MCH 08/15/2020 22 6*    MCHC 08/15/2020 30 6*    RDW 08/15/2020 20 4*    Platelets 91/67/6692 56*    nRBC 08/15/2020 0     Neutrophils Relative 08/15/2020 75     Immat GRANS % 08/15/2020 0     Lymphocytes Relative 08/15/2020 17     Monocytes Relative 08/15/2020 5     Eosinophils Relative 08/15/2020 3     Basophils Relative 08/15/2020 0     Neutrophils Absolute 08/15/2020 3 99     Immature Grans Absolute 08/15/2020 0 02     Lymphocytes Absolute 08/15/2020 0 93     Monocytes Absolute 08/15/2020 0 26     Eosinophils Absolute 08/15/2020 0 14     Basophils Absolute 08/15/2020 0 01     Sodium 08/15/2020 138     Potassium 08/15/2020 4 1     Chloride 08/15/2020 106     CO2 08/15/2020 25     ANION GAP 08/15/2020 7     BUN 08/15/2020 9     Creatinine 08/15/2020 0 91     Glucose 08/15/2020 88     Calcium 08/15/2020 7 6*    AST 08/15/2020 27     ALT 08/15/2020 23     Alkaline Phosphatase 08/15/2020 143*    Total Protein 08/15/2020 6 4     Albumin 08/15/2020 3 0*    Total Bilirubin 08/15/2020 0 60     eGFR 08/15/2020 92     LACTIC ACID 08/15/2020 1 0     Protime 08/15/2020 16 8*    INR 08/15/2020 1 38*    PTT 08/15/2020 39*    Procalcitonin 08/15/2020 28 04*    Blood Culture 08/15/2020 Received in Microbiology Lab  Culture in Progress       Blood Culture 08/15/2020 Received in Microbiology Lab  Culture in Progress   Sodium 08/16/2020 139     Potassium 08/16/2020 3 6     Chloride 08/16/2020 110*    CO2 08/16/2020 24     ANION GAP 08/16/2020 5     BUN 08/16/2020 6     Creatinine 08/16/2020 0 71     Glucose 08/16/2020 85     Calcium 08/16/2020 7 0*    AST 08/16/2020 18     ALT 08/16/2020 18     Alkaline Phosphatase 08/16/2020 132*    Total Protein 08/16/2020 5 4*    Albumin 08/16/2020 2 5*    Total Bilirubin 08/16/2020 0 40     eGFR 08/16/2020 103     Magnesium 08/16/2020 1 6     WBC 08/16/2020 1 84*    RBC 08/16/2020 3 26*    Hemoglobin 08/16/2020 7 3*    Hematocrit 08/16/2020 23 9*    MCV 08/16/2020 73*    MCH 08/16/2020 22 4*    MCHC 08/16/2020 30 5*    RDW 08/16/2020 20 5*    Platelets 66/10/2098 42*    nRBC 08/16/2020 0     Segmented % 08/16/2020 76*    Lymphocytes % 08/16/2020 23     Monocytes % 08/16/2020 0*    Eosinophils, % 08/16/2020 1     Basophils % 08/16/2020 0     Absolute Neutrophils 08/16/2020 1 40*    Lymphocytes Absolute 08/16/2020 0 42*    Monocytes Absolute 08/16/2020 0 00     Eosinophils Absolute 08/16/2020 0 02     Basophils Absolute 08/16/2020 0 00     Total Counted 08/16/2020 100     RBC Morphology 08/16/2020 Present     Anisocytosis 08/16/2020 Present     Hypochromia 08/16/2020 Present     Microcytes 08/16/2020 Present     Ovalocytes 08/16/2020 Present     Schistocytes 08/16/2020 Present     Tear Drop Cells 08/16/2020 Present     Platelet Estimate 29/47/3414 Decreased*     Imaging Studies: I have personally reviewed pertinent reports  EKG, Pathology, and Other Studies: I have personally reviewed pertinent reports  Culture  Lab Results   Component Value Date    BLOODCX Received in Microbiology Lab  Culture in Progress  08/15/2020    BLOODCX Received in Microbiology Lab  Culture in Progress   08/15/2020    BLOODCX Candida albicans (A) 08/12/2020    BLOODCX Candida albicans (A) 08/12/2020    BLOODCX No Growth After 4 Days  08/11/2020    BLOODCX No Growth After 4 Days   08/11/2020     No results found for: WOUNDCULT  No results found for: URINECX  No results found for: SPUTUMCULTUR    Principal Problem:    Fungemia  Active Problems:    HTN (hypertension)    Alcoholic cirrhosis of liver with ascites (HCC)    COPD (chronic obstructive pulmonary disease) (HCC)    Thrombocytopenia (HCC)    Esophageal varices determined by endoscopy ([facility] [address] )    Esophageal dysphagia    Opioid dependence ([facility] [address] )    Pancytopenia ([address] )    History of substance abuse ([facility] [address] )    Chronic anemia    Tobacco abuse

## 2020-08-16 NOTE — ASSESSMENT & PLAN NOTE
Patient is a smoker and has chronic COPD      Continue nicotine comes for smoking cessation    I placed him on DuoNeb nebulizer since patient has expiratory wheezing

## 2020-08-16 NOTE — ED PROVIDER NOTES
History  Chief Complaint   Patient presents with    Blood Infection     pt states he was jsut discharged from hospital and got phone call today  Pt states he was told he has a blood infection     A 50-year-old male re-presented to the emergency department due to blood cultures x2 revealing Candida fungemia  Patient signed out against medical advice after being admitted for septic shock by myself a few days ago  Blood cultures today grew out Candida and patient was called and advised to return immediately  Patient states he was feeling well upon leaving the hospital but earlier today started to feel very fatigued  Patient denies any further drug abuse  Prior to Admission Medications   Prescriptions Last Dose Informant Patient Reported? Taking?    albuterol (2 5 mg/3 mL) 0 083 % nebulizer solution 8/15/2020 at Unknown time Self Yes Yes   Sig: Take 2 5 mg by nebulization every 6 (six) hours as needed for wheezing or shortness of breath   albuterol (PROVENTIL HFA,VENTOLIN HFA) 90 mcg/act inhaler 8/15/2020 at Unknown time Self Yes Yes   Sig: Inhale 2 puffs as needed for shortness of breath   lactulose 20 g/30 mL  Self No No   Sig: Take 30 mL (20 g total) by mouth 3 (three) times a day   pantoprazole (PROTONIX) 40 mg tablet 8/15/2020 at Unknown time Self No Yes   Sig: Take 1 tablet (40 mg total) by mouth daily   propranolol (INDERAL) 20 mg tablet Not Taking at Unknown time Self No No   Sig: Take 1 tablet (20 mg total) by mouth daily   Patient not taking: Reported on 8/15/2020   spironolactone (ALDACTONE) 50 mg tablet Not Taking at Unknown time  No No   Sig: Take 1 tablet (50 mg total) by mouth daily   Patient not taking: Reported on 8/15/2020      Facility-Administered Medications: None       Past Medical History:   Diagnosis Date    Ascites     Cardiac disease     Chronic left-sided headaches     Chronic pain     back and neck    Chronic pain disorder     [facility]) ([facility] )     Esophageal varices in cirrhosis ([facility] )     History of transfusion     Hypertension     Hypokalemia 1/3/2020       Past Surgical History:   Procedure Laterality Date    BACK SURGERY      CERVICAL FUSION      CHOLECYSTECTOMY      COLONOSCOPY  11/23/2019    Internal external hemorrhoids, nonbleeding rectal varices    CORONARY ANGIOPLASTY WITH STENT PLACEMENT      2006-PTCA/STENT to mid and distal RCA; then 2009 Left-LAD normal, circumflex-normal,Proximal % stnosis, Chronic total occlusion    EGD  01/11/2006    GALLBLADDER SURGERY      HERNIA REPAIR      IR PARACENTESIS  7/15/2020    IR PARACENTESIS  8/13/2020    JOINT REPLACEMENT Left     knee    KNEE SURGERY      NECK SURGERY      [facility]ACENTESIS      [facility]    UPPER GASTROINTESTINAL ENDOSCOPY  01/11/2006    Gastritis and duodenitis  Pathology negative for any abnormality    UPPER GASTROINTESTINAL ENDOSCOPY  11/23/2019    Grade 3 esophageal varices and portal hypertensive gastropathy       Family History   Problem Relation Age of Onset    Heart disease Father     Heart disease Brother     Cardiomyopathy Brother     Colon polyps Neg Hx     Colon cancer Neg Hx      I have reviewed and agree with the history as documented  E-Cigarette/Vaping    E-Cigarette Use Never User      E-Cigarette/Vaping Substances    Nicotine No     THC No     CBD No     Flavoring No     Other No     Unknown No      Social History     Tobacco Use    Smoking status: Current Every Day Smoker     Packs/day: 3 50     Years: 45 00     Pack years: 157 50     Types: Cigarettes    Smokeless tobacco: Never Used    Tobacco comment: encouraged smoking cessation   Substance Use Topics    Alcohol use: Not Currently    Drug use: No       Review of Systems   Constitutional: Positive for fatigue  Negative for chills, diaphoresis and fever  HENT: Negative for congestion and rhinorrhea  Eyes: Negative for pain and visual disturbance     Respiratory: Negative for cough, shortness of breath and wheezing  Cardiovascular: Negative for chest pain and leg swelling  Gastrointestinal: Negative for abdominal pain, diarrhea, nausea and vomiting  Genitourinary: Negative for difficulty urinating, dysuria, frequency and urgency  Musculoskeletal: Negative for back pain and neck pain  Skin: Negative for color change and rash  Neurological: Negative for syncope, numbness and headaches  All other systems reviewed and are negative  Physical Exam  Physical Exam  Vitals signs and nursing note reviewed  Constitutional:       Appearance: He is well-developed  HENT:      Head: Normocephalic and atraumatic  Eyes:      Conjunctiva/sclera: Conjunctivae normal    Neck:      Musculoskeletal: Normal range of motion and neck supple  Cardiovascular:      Rate and Rhythm: Normal rate and regular rhythm  Pulmonary:      Effort: Pulmonary effort is normal  No respiratory distress  Abdominal:      Palpations: Abdomen is soft  Tenderness: There is no abdominal tenderness  Musculoskeletal: Normal range of motion  General: No tenderness  Skin:     General: Skin is warm  Findings: No erythema  Neurological:      Mental Status: He is alert and oriented to person, place, and time     Psychiatric:         Behavior: Behavior normal          Vital Signs  ED Triage Vitals   Temperature Pulse Respirations Blood Pressure SpO2   08/15/20 1959 08/15/20 1959 08/15/20 1959 08/15/20 1959 08/15/20 1959   97 7 °F (36 5 °C) 70 18 119/65 96 %      Temp Source Heart Rate Source Patient Position - Orthostatic VS BP Location FiO2 (%)   08/15/20 1959 08/15/20 1959 -- -- --   Temporal Monitor         Pain Score       08/15/20 2050       6           Vitals:    08/15/20 1959 08/15/20 2048 08/15/20 2100 08/15/20 2254   BP: 119/65 104/61 104/61    Pulse: 70 69 64 66         Visual Acuity      ED Medications  Medications   albuterol inhalation solution 2 5 mg (has no administration in time range)   pantoprazole (PROTONIX) EC tablet 40 mg (has no administration in time range)   propranolol (INDERAL) tablet 20 mg (has no administration in time range)   spironolactone (ALDACTONE) tablet 50 mg (has no administration in time range)   nicotine (NICODERM CQ) 21 mg/24 hr TD 24 hr patch 1 patch (has no administration in time range)   acetaminophen (TYLENOL) tablet 650 mg (has no administration in time range)   lactulose 20 g/30 mL oral solution 20 g (has no administration in time range)   fluconazole (DIFLUCAN) IVPB (premix) 400 mg 200 mL (400 mg Intravenous New Bag 8/15/20 2351)   nicotine polacrilex (NICORETTE) gum 2 mg (has no administration in time range)   heparin (porcine) subcutaneous injection 5,000 Units (5,000 Units Subcutaneous Given 8/15/20 2351)   LORazepam (ATIVAN) tablet 0 5 mg (0 5 mg Oral Given 8/16/20 0133)   fluconazole (DIFLUCAN) IVPB (premix) 400 mg 200 mL (400 mg Intravenous New Bag 8/15/20 2022)       Diagnostic Studies  Results Reviewed     Procedure Component Value Units Date/Time    CBC and differential [[id number]]  (Abnormal) Collected:  08/15/20 2012    Lab Status:  Final result Specimen:  Blood from Arm, Right Updated:  08/15/20 2114     WBC 5 35 Thousand/uL      RBC 3 81 Million/uL      Hemoglobin 8 6 g/dL      Hematocrit 28 1 %      MCV 74 fL      MCH 22 6 pg      MCHC 30 6 g/dL      RDW 20 4 %      Platelets 56 Thousands/uL      nRBC 0 /100 WBCs      Neutrophils Relative 75 %      Immat GRANS % 0 %      Lymphocytes Relative 17 %      Monocytes Relative 5 %      Eosinophils Relative 3 %      Basophils Relative 0 %      Neutrophils Absolute 3 99 Thousands/µL      Immature Grans Absolute 0 02 Thousand/uL      Lymphocytes Absolute 0 93 Thousands/µL      Monocytes Absolute 0 26 Thousand/µL      Eosinophils Absolute 0 14 Thousand/µL      Basophils Absolute 0 01 Thousands/µL     Comprehensive metabolic panel [[id number]]  (Abnormal) Collected:  08/15/20 2012 Lab Status:  Final result Specimen:  Blood from Arm, Right Updated:  08/15/20 2049     Sodium 138 mmol/L      Potassium 4 1 mmol/L      Chloride 106 mmol/L      CO2 25 mmol/L      ANION GAP 7 mmol/L      BUN 9 mg/dL      Creatinine 0 91 mg/dL      Glucose 88 mg/dL      Calcium 7 6 mg/dL      AST 27 U/L      ALT 23 U/L      Alkaline Phosphatase 143 U/L      Total Protein 6 4 g/dL      Albumin 3 0 g/dL      Total Bilirubin 0 60 mg/dL      eGFR 92 ml/min/1 73sq m     Narrative:       [facility] guidelines for Chronic Kidney Disease (CKD):     Stage 1 with normal or high GFR (GFR > 90 mL/min/1 73 square meters)    Stage 2 Mild CKD (GFR = 60-89 mL/min/1 73 square meters)    Stage 3A Moderate CKD (GFR = 45-59 mL/min/1 73 square meters)    Stage 3B Moderate CKD (GFR = 30-44 mL/min/1 73 square meters)    Stage 4 Severe CKD (GFR = 15-29 mL/min/1 73 square meters)    Stage 5 End Stage CKD (GFR <15 mL/min/1 73 square meters)  Note: GFR calculation is accurate only with a steady state creatinine    Protime-INR [[id number]]  (Abnormal) Collected:  08/15/20 2012    Lab Status:  Final result Specimen:  Blood from Arm, Right Updated:  08/15/20 2042     Protime 16 8 seconds      INR 1 38    APTT [[id number]]  (Abnormal) Collected:  08/15/20 2012    Lab Status:  Final result Specimen:  Blood from Arm, Right Updated:  08/15/20 2042     PTT 39 seconds     Lactic Acid [[id number]]  (Normal) Collected:  08/15/20 2012    Lab Status:  Final result Specimen:  Blood from Arm, Right Updated:  08/15/20 2041     LACTIC ACID 1 0 mmol/L     Narrative:       Result may be elevated if tourniquet was used during collection  Blood culture #1 [[id number]] Collected:  08/15/20 2012    Lab Status: In process Specimen:  Blood from Arm, Right Updated:  08/15/20 2019    Blood culture #2 [[id number]] Collected:  08/15/20 2012    Lab Status:   In process Specimen:  Blood from Hand, Right Updated:  08/15/20 2019    Procalcitonin [[id number]] Collected:  08/15/20 2012    Lab Status: In process Specimen:  Blood from Arm, Right Updated:  08/15/20 2018                 No orders to display              Procedures  Procedures         ED Course                   AUSTIN/DAST-10      Most Recent Value   How many times in the past year have you    Used an illegal drug or used a prescription medication for non-medical reasons? Never Filed at: 08/15/2020 2029                                [facility]  Number of Diagnoses or Management Options  Fungemia:   Diagnosis management comments: 46-year-old male presenting with fungemia in blood cultures  Will repeat sepsis evaluation  Administer IV fluconazole  Admit for further evaluation  Disposition  Final diagnoses:   Fungemia     Time reflects when diagnosis was documented in both MDM as applicable and the Disposition within this note     Time User Action Codes Description Comment    8/15/2020  8:17 PM Villa Yoder Add [B49] Fungemia       ED Disposition     ED Disposition Condition Date/Time Comment    Admit Stable Sat Aug 15, 2020  8:17 PM Case was discussed with ROD and the patient's admission status was agreed to be Admission Status: inpatient status to the service of Dr Susan Poon   Follow-up Information    None         Current Discharge Medication List      CONTINUE these medications which have NOT CHANGED    Details   albuterol (2 5 mg/3 mL) 0 083 % nebulizer solution Take 2 5 mg by nebulization every 6 (six) hours as needed for wheezing or shortness of breath      albuterol (PROVENTIL HFA,VENTOLIN HFA) 90 mcg/act inhaler Inhale 2 puffs as needed for shortness of breath    Comments: Substitution to a formulary equivalent within the same pharmaceutical class is authorized        pantoprazole (PROTONIX) 40 mg tablet Take 1 tablet (40 mg total) by mouth daily  Qty: 30 tablet, Refills: 2    Associated Diagnoses: Esophageal varices without bleeding, unspecified esophageal varices type ([facility] ) lactulose 20 g/30 mL Take 30 mL (20 g total) by mouth 3 (three) times a day  Qty: 2700 mL, Refills: 5    Associated Diagnoses: Alcoholic cirrhosis of liver without ascites (HCC)      propranolol (INDERAL) 20 mg tablet Take 1 tablet (20 mg total) by mouth daily  Qty: 30 tablet, Refills: 2    Associated Diagnoses: Secondary esophageal varices without bleeding (HCC)      spironolactone (ALDACTONE) 50 mg tablet Take 1 tablet (50 mg total) by mouth daily  Qty: 90 tablet, Refills: 0    Associated Diagnoses: Other ascites           No discharge procedures on file      PDMP Review       Value Time User    PDMP Reviewed  Yes 1/4/2020 11:59 AM Zofia Gross MD          ED Provider  Electronically Signed by           Denisha Chicas DO  08/16/20 0134

## 2020-08-16 NOTE — PLAN OF CARE
Problem: PAIN - ADULT  Goal: Verbalizes/displays adequate comfort level or baseline comfort level  Description: Interventions:  - Encourage patient to monitor pain and request assistance  - Assess pain using appropriate pain scale  - Administer analgesics based on type and severity of pain and evaluate response  - Implement non-pharmacological measures as appropriate and evaluate response  - Consider cultural and social influences on pain and pain management  - Notify physician/advanced practitioner if interventions unsuccessful or patient reports new pain  Outcome: Progressing     Problem: INFECTION - ADULT  Goal: Absence or prevention of progression during hospitalization  Description: INTERVENTIONS:  - Assess and monitor for signs and symptoms of infection  - Monitor lab/diagnostic results  - Monitor all insertion sites, i e  indwelling lines, tubes, and drains  - Monitor endotracheal if appropriate and nasal secretions for changes in amount and color  - [place] appropriate cooling/warming therapies per order  - Administer medications as ordered  - Instruct and encourage patient and family to use good hand hygiene technique  - Identify and instruct in appropriate isolation precautions for identified infection/condition  Outcome: Progressing  Goal: Absence of fever/infection during neutropenic period  Description: INTERVENTIONS:  - Monitor WBC    Outcome: Progressing     Problem: SAFETY ADULT  Goal: Patient will remain free of falls  Description: INTERVENTIONS:  - Assess patient frequently for physical needs  -  Identify cognitive and physical deficits and behaviors that affect risk of falls    -  [place] fall precautions as indicated by assessment   - Educate patient/family on patient safety including physical limitations  - Instruct patient to call for assistance with activity based on assessment  - Modify environment to reduce risk of injury  - Consider OT/PT consult to assist with strengthening/mobility  Outcome: Progressing  Goal: Maintain or return to baseline ADL function  Description: INTERVENTIONS:  -  Assess patient's ability to carry out ADLs; assess patient's baseline for ADL function and identify physical deficits which impact ability to perform ADLs (bathing, care of mouth/teeth, toileting, grooming, dressing, etc )  - Assess/evaluate cause of self-care deficits   - Assess range of motion  - Assess patient's mobility; develop plan if impaired  - Assess patient's need for assistive devices and provide as appropriate  - Encourage maximum independence but intervene and supervise when necessary  - Involve family in performance of ADLs  - Assess for home care needs following discharge   - Consider OT consult to assist with ADL evaluation and planning for discharge  - Provide patient education as appropriate  Outcome: Progressing  Goal: Maintain or return mobility status to optimal level  Description: INTERVENTIONS:  - Assess patient's baseline mobility status (ambulation, transfers, stairs, etc )    - Identify cognitive and physical deficits and behaviors that affect mobility  - Identify mobility aids required to assist with transfers and/or ambulation (gait belt, sit-to-stand, lift, walker, cane, etc )  - [place] fall precautions as indicated by assessment  - Record patient progress and toleration of activity level on Mobility SBAR; progress patient to next Phase/Stage  - Instruct patient to call for assistance with activity based on assessment  - Consider rehabilitation consult to assist with strengthening/weightbearing, etc   Outcome: Progressing     Problem: DISCHARGE PLANNING  Goal: Discharge to home or other facility with appropriate resources  Description: INTERVENTIONS:  - Identify barriers to discharge w/patient and caregiver  - Arrange for needed discharge resources and transportation as appropriate  - Identify discharge learning needs (meds, wound care, etc )  - Arrange for interpretive services to assist at discharge as needed  - Refer to Case Management Department for coordinating discharge planning if the patient needs post-hospital services based on physician/advanced practitioner order or complex needs related to functional status, cognitive ability, or social support system  Outcome: Progressing     Problem: Knowledge Deficit  Goal: Patient/family/caregiver demonstrates understanding of disease process, treatment plan, medications, and discharge instructions  Description: Complete learning assessment and assess knowledge base    Interventions:  - Provide teaching at level of understanding  - Provide teaching via preferred learning methods  Outcome: Progressing

## 2020-08-16 NOTE — ASSESSMENT & PLAN NOTE
Patient has alcoholic cirrhosis with esophageal varices and trace ascites that could not be tapped on August 13th by IR      Will continue lactulose, Aldactone and Inderal

## 2020-08-16 NOTE — ASSESSMENT & PLAN NOTE
Patient presented to once on August 11th, 2020 with fever and met criteria for sepsis, he was hypotensive, had septic shock requiring IV fluids and IV pressors for couple of hours via central line  Blood cultures on August 11th showed no growth  Blood cultures from August 12th grow Candida albicans  Apparently central line was planes before blood cultures from August 12th were drawn  Patient has signed AMA then came back after a phone call that he has positive blood cultures  Patient continued to have fevers as high as 103 at home  Repeat blood cultures are pending, patient is on IV fluconazole    I asked Infectious Disease to see the patient    Echo was ordered to look for vegetations    He requires > 2 midnights hospital stay

## 2020-08-16 NOTE — PLAN OF CARE
Problem: PAIN - ADULT  Goal: Verbalizes/displays adequate comfort level or baseline comfort level  Description: Interventions:  - Encourage patient to monitor pain and request assistance  - Assess pain using appropriate pain scale  - Administer analgesics based on type and severity of pain and evaluate response  - Implement non-pharmacological measures as appropriate and evaluate response  - Consider cultural and social influences on pain and pain management  - Notify physician/advanced practitioner if interventions unsuccessful or patient reports new pain  8/16/2020 1617 by Chantell Klein RN  Outcome: Progressing  8/16/2020 0639 by Chantell Klein RN  Outcome: Progressing

## 2020-08-16 NOTE — ASSESSMENT & PLAN NOTE
· Patient signed out [facility] 8/14 after presenting for sepsis on 08/11 requiring pressors, initial bcx from 8/11 negative x 72 hrs, repeat bcx from 8/12 + for Candida albicans 2/2 - patient was called back to come in for IV treatment & admission  · Upon admission vital signs stable patient received fluconazole  mg will give additional 400mg  for initial load then continue 400 mg daily  · ID consulted  · VSS  · No obvious source for fungemia  · Patient denies IV drug use  · Recent TLC placement however, bcx obtained prior to insertion   · Will obtain echocardiogram  · Trend white blood cells and temperature  · Procalcitonin on 08/14 was 95, pending this admission  · Repeat blood cultures obtained in the ED, will schedule another set with AM labs   · Imaging from recent admission includes chest x-ray which was negative for any findings

## 2020-08-16 NOTE — SPEECH THERAPY NOTE
Speech Language/Pathology  Speech-Language Pathology Bedside Swallow Evaluation        Patient Name: Nuria Henry    YPIYM'I Date: 8/16/2020     Problem List  Principal Problem:    Fungemia  Active Problems:    HTN (hypertension)    Alcoholic cirrhosis of liver with ascites (HCC)    COPD (chronic obstructive pulmonary disease) (HCC)    Thrombocytopenia (HCC)    Esophageal varices determined by endoscopy ([facility] [address] )    Esophageal dysphagia    Opioid dependence ([facility] [address] )    Pancytopenia (HCC)    History of substance abuse ([facility] [address] )    Chronic anemia    Tobacco abuse         Past Medical History  Past Medical History:   Diagnosis Date    Ascites     Cardiac disease     Chronic left-sided headaches     Chronic pain     back and neck    Chronic pain disorder     COPD (chronic obstructive pulmonary disease) (HCC)     Esophageal varices in cirrhosis ([facility] [address] )     History of transfusion     Hypertension     Hypokalemia 1/3/2020       Past Surgical History  Past Surgical History:   Procedure Laterality Date    BACK SURGERY      CERVICAL FUSION      CHOLECYSTECTOMY      COLONOSCOPY  11/23/2019    Internal external hemorrhoids, nonbleeding rectal varices    CORONARY ANGIOPLASTY WITH STENT PLACEMENT      2006-PTCA/STENT to mid and distal RCA; then 2009 Left-LAD normal, circumflex-normal,Proximal % stnosis, Chronic total occlusion    EGD  01/11/2006    GALLBLADDER SURGERY      HERNIA REPAIR      IR PARACENTESIS  7/15/2020    IR PARACENTESIS  8/13/2020    JOINT REPLACEMENT Left     knee    KNEE SURGERY      NECK SURGERY      PARACENTESIS      [facility]    UPPER GASTROINTESTINAL ENDOSCOPY  01/11/2006    Gastritis and duodenitis  Pathology negative for any abnormality    UPPER GASTROINTESTINAL ENDOSCOPY  11/23/2019    Grade 3 esophageal varices and portal hypertensive gastropathy       Summary    Assessment was limited due to poor pt participation   With limited assessment and with limited textures, pt presents with oropharyngeal swallow that appeared WNL  Most of pt's dysphagia sx seem esophageal in nature  He did not have any immediate s/s of aspiration, however, coughing noted when pt reclined soon after PO trials; he is likely at risk for bottom up aspiration  Pt agreeable to changing diet, to ease swallowing, but does not want pureed diet  Recommendations:   Diet: mechanically altered/level 2 diet and thin liquids   Meds: whole with liquid and crush larger pills, per pt request   Independent for feeding  Aspiration precautions and compensatory swallowing strategies: upright posture, slow rate of feeding, small bites/sips and smaller more frequent meals  Other Recommendations/ considerations: ST to see for dysphagia tx, 2-3x per week  Recommend GI follow up, and consult with dietician  Current Medical Status  Copied from admission:  Marlene Sepulveda is a 62 y o  male who presents with PMH of liver cirrhosis due to alcohol abuse with ascites and esophageal varices, hypertension, chronic anemia, thrombocytopenia and tobacco abuse  Patient was recently admitted and treated for septic shock which after resolution of symptoms and hypotension he decided to sign out against medical advice on 08/14  His repeat blood cultures came back positive with Candida albicans 2/2 sets  He was called by the emergency department physician to come to the hospital for treatment  The patient came back this evening, was treated with fluconazole, initial IV bolus of 800 mg followed by 400 mg daily  Repeat blood cultures were obtained, infectious disease was consulted and echo is pending  He denies any IV drug use  Vitals signs were stable, the patient was admitted to the MS unit  Order received and chart reviewed  Pt has hx of esophageal varices and esophageal dysphagia  He has had multiple EGD's, most recent one in chart was last month, which mentioned diffuse scarring in distal esophagus   Pt reports he was supposed to have his esophagus stretched, but it hasn't been done; states "they kicked me out before they did it", however pt has left the hospital AMA more than once  Spoke with RN who reports pt unable to swallow scrambled eggs or meatloaf today  Pt says the food needs to be more runny, softer, chopped small  He is not interested in a pureed diet  Pt has lost almost 50 pounds since November 2019 due to poor PO intake due to dysphagia  Past medical history:   Please see H&P for details    Special Studies:  CXR-No evidence of pneumonia or other acute abnormality in the chest     EGD July 2020-FINDINGS:  · Small varices with no bleeding (no red ace sign) in the lower third of the esophagus  Evidence of grade 1 varices  Attempted to band given h/o GIB  However, diffuse scarring of the distal esophagus from previous ligation  Unable to suction for effective banding  · Moderate, generalized erythematous and hemorrhagic mucosa in the fundus of the stomach, body of the stomach and antrum  Consistent with portal hypertensive gastropathy  No gastric varices noted  · The duodenum appeared normal        Social/Education/Vocational Hx:  Pt lives with family    Swallow Information   Current Risks for Dysphagia & Aspiration: known history of dysphagia and esophageal dysphagia, GERD, COPD  Current Symptoms/Concerns: Food won't "go down", globus sensation, choking sensation  Current Diet: regular diet and thin liquids   Baseline Diet: very soft, "runny", finely chopped foods, thin liquids    Baseline Assessment   Behavior/Cognition: alert and reduced cooperation, refused to eat more than a few bites of magic cup and 3 sips of soda  Speech/Language Status: able to participate in conversation, able to follow commands and no dysarthria or apraxia  Patient Positioning: upright in bed     Swallow Mechanism Exam   Pt did not complete oral mech exam, states "I'm too tired"  Oral motor function appears grossly intact   Able to view some anterior teeth, suspect some missing teeth  Cough is strong, Pt is on NC  Vocal quality somewhat breathy  Consistencies Assessed and Performance   Consistencies Administered: thin liquids and puree  -pt agreeable to only a few bites of Magic cup and 3 sips of soda by straw  He was offered multiple other options but he refused, repeatedly stating that he wasn't hungry  Oral Stage: Adequate bolus retrieval and draw from straw, good lip seal, prompt bolus manipulation and transfer  Pharyngeal Stage: Hyolaryngeal elevation was observed and swallows appeared prompt  There were no s/s of aspiration immediately, however pt reclined shortly after taking his last sip of soda, and coughed  Esophageal Concerns: globus sensation and GERD, esophageal varices with banding, multiple EGDs, esophageal scarring      Results Reviewed with: patient, RN and MD   Dysphagia Goals: 1  Pt will tolerate a dysphagia 2 diet and thin liquids without s/s of aspiration and good esophageal clearance  2  Pt will tolerate LRD without s/s of aspiration across all meals and snacks     Discharge recommendation: likely no follow up needed for swallowing    Speech Therapy Prognosis   Prognosis: fair    Prognosis Considerations: medical status and therapeutic potential

## 2020-08-16 NOTE — ASSESSMENT & PLAN NOTE
· Ammonia on recent labs < 10   · Underwent a paracentesis at the end of last year with 2 L of fluid removed  Attempted para in July 2020 & most recent admission but there was not enough fluid   · Lactulose daily   · Patient has propranolol & aldactone listed but states that he does not take either  · Trend LFTs - currently acceptable   · INR 1 38  · MELD score 10  · Follows with GI as outpt

## 2020-08-16 NOTE — PROGRESS NOTES
Patient reports swallowing dysfunction due to esophageal varices    Will ask speech to evaluate the patient

## 2020-08-17 ENCOUNTER — APPOINTMENT (INPATIENT)
Dept: NON INVASIVE DIAGNOSTICS | Facility: HOSPITAL | Age: 60
DRG: 871 | End: 2020-08-17
Payer: MEDICARE

## 2020-08-17 VITALS
WEIGHT: 150 LBS | BODY MASS INDEX: 21.47 KG/M2 | TEMPERATURE: 97.8 F | HEART RATE: 66 BPM | RESPIRATION RATE: 19 BRPM | DIASTOLIC BLOOD PRESSURE: 78 MMHG | OXYGEN SATURATION: 98 % | SYSTOLIC BLOOD PRESSURE: 140 MMHG | HEIGHT: 70 IN

## 2020-08-17 PROBLEM — Z53.29 LEFT AGAINST MEDICAL ADVICE: Status: ACTIVE | Noted: 2020-08-17

## 2020-08-17 LAB
ALBUMIN SERPL BCP-MCNC: 2.4 G/DL (ref 3.5–5)
ALP SERPL-CCNC: 120 U/L (ref 46–116)
ALT SERPL W P-5'-P-CCNC: 17 U/L (ref 12–78)
AMMONIA PLAS-SCNC: 28 UMOL/L (ref 11–35)
ANION GAP SERPL CALCULATED.3IONS-SCNC: 5 MMOL/L (ref 4–13)
AST SERPL W P-5'-P-CCNC: 17 U/L (ref 5–45)
BACTERIA BLD CULT: NORMAL
BACTERIA BLD CULT: NORMAL
BASOPHILS # BLD AUTO: 0 THOUSANDS/ΜL (ref 0–0.1)
BASOPHILS NFR BLD AUTO: 0 % (ref 0–1)
BILIRUB SERPL-MCNC: 0.6 MG/DL (ref 0.2–1)
BUN SERPL-MCNC: 5 MG/DL (ref 5–25)
CALCIUM SERPL-MCNC: 7.2 MG/DL (ref 8.3–10.1)
CHLORIDE SERPL-SCNC: 110 MMOL/L (ref 100–108)
CO2 SERPL-SCNC: 25 MMOL/L (ref 21–32)
CREAT SERPL-MCNC: 0.79 MG/DL (ref 0.6–1.3)
EOSINOPHIL # BLD AUTO: 0.04 THOUSAND/ΜL (ref 0–0.61)
EOSINOPHIL NFR BLD AUTO: 3 % (ref 0–6)
ERYTHROCYTE [DISTWIDTH] IN BLOOD BY AUTOMATED COUNT: 20.4 % (ref 11.6–15.1)
GFR SERPL CREATININE-BSD FRML MDRD: 98 ML/MIN/1.73SQ M
GLUCOSE SERPL-MCNC: 89 MG/DL (ref 65–140)
HCT VFR BLD AUTO: 24.1 % (ref 36.5–49.3)
HGB BLD-MCNC: 7.5 G/DL (ref 12–17)
IMM GRANULOCYTES # BLD AUTO: 0.02 THOUSAND/UL (ref 0–0.2)
IMM GRANULOCYTES NFR BLD AUTO: 1 % (ref 0–2)
INR PPP: 1.32 (ref 0.84–1.19)
LYMPHOCYTES # BLD AUTO: 0.43 THOUSANDS/ΜL (ref 0.6–4.47)
LYMPHOCYTES NFR BLD AUTO: 27 % (ref 14–44)
MCH RBC QN AUTO: 22.8 PG (ref 26.8–34.3)
MCHC RBC AUTO-ENTMCNC: 31.1 G/DL (ref 31.4–37.4)
MCV RBC AUTO: 73 FL (ref 82–98)
MONOCYTES # BLD AUTO: 0.12 THOUSAND/ΜL (ref 0.17–1.22)
MONOCYTES NFR BLD AUTO: 8 % (ref 4–12)
NEUTROPHILS # BLD AUTO: 0.96 THOUSANDS/ΜL (ref 1.85–7.62)
NEUTS SEG NFR BLD AUTO: 61 % (ref 43–75)
NRBC BLD AUTO-RTO: 0 /100 WBCS
PLATELET # BLD AUTO: 38 THOUSANDS/UL (ref 149–390)
POTASSIUM SERPL-SCNC: 3.8 MMOL/L (ref 3.5–5.3)
PROT SERPL-MCNC: 5.6 G/DL (ref 6.4–8.2)
PROTHROMBIN TIME: 16.2 SECONDS (ref 11.6–14.5)
RBC # BLD AUTO: 3.29 MILLION/UL (ref 3.88–5.62)
SODIUM SERPL-SCNC: 140 MMOL/L (ref 136–145)
WBC # BLD AUTO: 1.57 THOUSAND/UL (ref 4.31–10.16)

## 2020-08-17 PROCEDURE — 80053 COMPREHEN METABOLIC PANEL: CPT | Performed by: INTERNAL MEDICINE

## 2020-08-17 PROCEDURE — 85610 PROTHROMBIN TIME: CPT | Performed by: INTERNAL MEDICINE

## 2020-08-17 PROCEDURE — 99239 HOSP IP/OBS DSCHRG MGMT >30: CPT | Performed by: INTERNAL MEDICINE

## 2020-08-17 PROCEDURE — 85025 COMPLETE CBC W/AUTO DIFF WBC: CPT | Performed by: INTERNAL MEDICINE

## 2020-08-17 PROCEDURE — 82140 ASSAY OF AMMONIA: CPT | Performed by: INTERNAL MEDICINE

## 2020-08-17 RX ORDER — FLUCONAZOLE 200 MG/1
400 TABLET ORAL DAILY
Qty: 28 TABLET | Refills: 0 | Status: ON HOLD | OUTPATIENT
Start: 2020-08-17 | End: 2020-08-20 | Stop reason: SDUPTHER

## 2020-08-17 RX ADMIN — SPIRONOLACTONE 50 MG: 25 TABLET ORAL at 10:23

## 2020-08-17 RX ADMIN — HEPARIN SODIUM 5000 UNITS: 5000 INJECTION INTRAVENOUS; SUBCUTANEOUS at 05:52

## 2020-08-17 RX ADMIN — PROPRANOLOL HYDROCHLORIDE 20 MG: 20 TABLET ORAL at 10:24

## 2020-08-17 RX ADMIN — PANTOPRAZOLE SODIUM 40 MG: 40 TABLET, DELAYED RELEASE ORAL at 05:52

## 2020-08-17 RX ADMIN — OXYCODONE HYDROCHLORIDE 10 MG: 5 TABLET ORAL at 07:48

## 2020-08-17 NOTE — ASSESSMENT & PLAN NOTE
· Pancytopenia, chronic secondary to alcoholic cirrhosis  · No evidence of bleeding    Results from last 7 days   Lab Units 08/17/20  0549 08/16/20  0615 08/15/20  2012 08/14/20  0614 08/13/20  0525  08/12/20  1617 08/11/20  1935   WBC Thousand/uL 1 57* 1 84* 5 35 4 40 14 82*  --  2 26* 2 83*   HEMOGLOBIN g/dL 7 5* 7 3* 8 6* 7 7* 8 2*   < > 6 7* 8 5*   PLATELETS Thousands/uL 38* 42* 56* 34* 49*  --  41* 56*    < > = values in this interval not displayed

## 2020-08-17 NOTE — ASSESSMENT & PLAN NOTE
· Alcoholic cirrhosis of liver with ascites    Was seen by IR and did not have enough fluid for paracentesis  · Continue propanolol spironolactone and lactulose

## 2020-08-17 NOTE — SOCIAL WORK
LOS: 2  Patient is a 30 day  Readmission from 8/12/20-8/14/20 when he left AMA and is not a Medicare Bundled patient  Attempted to meet with patient and he ask CM to return later  As per Dr Leon Jefferson, patient is signing out AMA and he feels "like he is in [facility]"  From MR from 8/13/20, patient resides with his wife and children in a 2 story home with 0 PRASHANT  He is independent of ADL's, drives and uses no assistive device  He has crutches and a nebulizer at home  He denies SNF/VNA in the past  He was at [facility] in 2019 for drug rehab  He does not have a POA/AD and was not interested in receiving information  He uses [employer] in [place] and reports no barriers in obtaining his meds,  His family will transport him home

## 2020-08-17 NOTE — DISCHARGE SUMMARY
Discharge- Pavel Pagan 1960, 62 y o  male MRN: [id number]  Unit/Bed#: -01 Encounter: [id number]  Primary Care Provider: Stefani Tovar   Date and time admitted to hospital: 8/15/2020  7:56 PM    ** Patient leaving against medical advice**    Admitting Provider:  Lenox Alpers, MD  Discharge Provider:  Nancy French DO  Admission Date: 8/15/2020       Discharge Date: 08/17/20   LOS: 2  Primary Care Physician at Discharge: Stefani Tovar [address] [place] [address] COURSE:  Pavel Pagan is a 62 y o  male who presented to hospital with blood infection  Patient has known alcoholic cirrhosis with ascites  The patient originally presented on 8/11/2020 for fever  He did have an EGD six days prior to that for assessment of his esophageal varices  Patient elected to leave prior to workup being complete and was discharged on augmentin  He re-presented hospital on 8/12/2020 where he was found to be in septic shock and admitted to intensive care unit requiring vasopressors  He was called on 8/15/2020 after initial blood cultures demonstrating Candida albicans  During this admission he was seen by infectious disease and started on IV fluconazole  Unfortunately today he is demanding to leave hospital understanding that she needs hospitalization to follow-up on repeated blood cultures to ensure clearance as well as monitoring of liver function while on IV fluconazole  He states that he feels better and wishes to take oral formulation  He is leaving against medical advice but will be prescribed 14 days of fluconazole 400 mg daily with the understanding that his liver may fail especially with underlying cirrhosis without close monitoring  Please see problem list listed below  DISCHARGE DIAGNOSES  * Fungemia  Assessment & Plan  · Fungemia unclear source  Patient known to leave [place] on multiple occasions  · Initially presented to hospital and was admitted on 8/11/2020 with septic shock requiring vasopressors    He then left against medical advice  · Fungemia may have been from translocation from esophagus  · Was seen by infectious disease and started on IV fluconazole with much improvement in symptoms  · Unfortunate patient is demanding to leave against medical advice at this time understanding that treatment with fluconazole may cause worsening liver failure which may result in death  · Leaving AMA however he will be prescribed fluconazole 400 mg daily for 14 days and advised to follow-up with PCP as soon as possible as he needs close monitoring of his liver function    Left against medical advice  Assessment & Plan  · Patient leaving against medical advise  Signed paper form  Understands risk of liver failure sepsis and death    Pancytopenia (HCC)  Assessment & Plan  · Pancytopenia, chronic secondary to alcoholic cirrhosis  · No evidence of bleeding    Results from last 7 days   Lab Units 08/17/20  0549 08/16/20  0615 08/15/20  2012 08/14/20  0614 08/13/20  0525  08/12/20  1617 08/11/20  1935   WBC Thousand/uL 1 57* 1 84* 5 35 4 40 14 82*  --  2 26* 2 83*   HEMOGLOBIN g/dL 7 5* 7 3* 8 6* 7 7* 8 2*   < > 6 7* 8 5*   PLATELETS Thousands/uL 38* 42* 56* 34* 49*  --  41* 56*    < > = values in this interval not displayed  COPD (chronic obstructive pulmonary disease) (HCC)  Assessment & Plan  · COPD without exacerbation  Still smokes  Continue inhalers as previously prescribed    Alcoholic cirrhosis of liver with ascites ([facility] [address] )  Assessment & Plan  · Alcoholic cirrhosis of liver with ascites  Was seen by IR and did not have enough fluid for paracentesis  · Continue propanolol spironolactone and lactulose    HTN (hypertension)  Assessment & Plan  · Essential hypertension monitor on propanolol and spironolactone    CONSULTING PROVIDERS   IP CONSULT TO INFECTIOUS DISEASES  IP CONSULT TO CASE MANAGEMENT    RADIOLOGY RESULTS  No results found      LABS  Results from last 7 days   Lab Units 08/17/20  0549 08/16/20  4843 08/15/20  2012 08/14/20  0614 08/13/20  0525 08/12/20  1956 08/12/20  1618 08/12/20  1617 08/11/20  1935   WBC Thousand/uL 1 57* 1 84* 5 35 4 40 14 82*  --   --  2 26* 2 83*   HEMOGLOBIN g/dL 7 5* 7 3* 8 6* 7 7* 8 2* 8 0*  --  6 7* 8 5*   HEMATOCRIT % 24 1* 23 9* 28 1* 24 6* 25 8*  --   --  22 2* 27 3*   MCV fL 73* 73* 74* 73* 72*  --   --  74* 72*   TOTAL NEUT ABS Thousand/uL  --  1 40*  --  3 96  --   --   --  2 19 2 49   BANDS PCT %  --   --   --  4  --   --   --  12* 4   PLATELETS Thousands/uL 38* 42* 56* 34* 49*  --   --  41* 56*   INR  1 32*  --  1 38* 1 60*  --   --  1 76*  --   --      Results from last 7 days   Lab Units 08/17/20  0549 08/16/20  0615 08/15/20  2012 08/14/20  0614 08/13/20  0518 08/12/20  1927 08/12/20  1619 08/11/20  1935   SODIUM mmol/L 140 139 138 138 137 131* 130* 132*   POTASSIUM mmol/L 3 8 3 6 4 1 3 4* 4 0 2 9* 3 0* 3 3*   CHLORIDE mmol/L 110* 110* 106 105 103 98* 96* 97*   CO2 mmol/L 25 24 25 25 19* 21 21 25   BUN mg/dL 5 6 9 14 14 13 13 6   CREATININE mg/dL 0 79 0 71 0 91 0 77 1 29 1 35* 1 29 0 85   CALCIUM mg/dL 7 2* 7 0* 7 6* 7 3* 6 8* 6 6* 8 0* 7 8*   ALBUMIN g/dL 2 4* 2 5* 3 0* 2 3* 2 6* 2 5* 3 1*  --    TOTAL BILIRUBIN mg/dL 0 60 0 40 0 60 0 70 2 50* 2 50* 2 40*  --    ALK PHOS U/L 120* 132* 143* 130* 161* 177* 246*  --    ALT U/L 17 18 23 21 29 21 21  --    AST U/L 17 18 27 33 61* 68* 53*  --    EGFR ml/min/1 73sq m 98 103 92 99 60 57 60 95   GLUCOSE RANDOM mg/dL 89 85 88 105 95 88 80 91     Results from last 7 days   Lab Units 08/12/20  1619 08/12/20  1617   CK TOTAL U/L 79  --    TROPONIN I ng/mL  --  0 03                          Results from last 7 days   Lab Units 08/16/20  1239 08/15/20  2012  08/13/20  0524  08/12/20  2243 08/12/20  1927  08/12/20  1617   LACTIC ACID mmol/L  --  1 0  --  3 9*  --  3 5* 4 6*  --  5 1*   PROCALCITONIN ng/ml 20 10* 28 04*   < >  --    < >  --   --    < >  --     < > = values in this interval not displayed             Cultures:   Results from last 7 days   Lab Units 08/12/20  2221   COLOR UA  Yellow   CLARITY UA  Clear   SPEC GRAV UA  <=1 005   PH UA  5 5   LEUKOCYTES UA  Negative   NITRITE UA  Negative   GLUCOSE UA mg/dl Negative   KETONES UA mg/dl Negative   BILIRUBIN UA  Negative   BLOOD UA  Small*      Results from last 7 days   Lab Units 08/12/20  2221   RBC UA /hpf 2-4*   WBC UA /hpf 0-1*   EPITHELIAL CELLS WET PREP /hpf Occasional   BACTERIA UA /hpf Occasional      Results from last 7 days   Lab Units 08/16/20  0615 08/15/20  2012 08/12/20  1618 08/12/20  1617 08/11/20  1935   BLOOD CULTURE  Received in Microbiology Lab  Culture in Progress  Received in Microbiology Lab  Culture in Progress  No Growth at 24 hrs  No Growth at 24 hrs  Candida albicans* Candida albicans* No Growth After 5 Days  No Growth After 5 Days  GRAM STAIN RESULT   --   --  Budding yeast* Yeast*  --        PHYSICAL EXAM:  Vitals:   Blood Pressure: 140/78 (08/17/20 0733)  Pulse: 66 (08/17/20 0733)  Temperature: 97 8 °F (36 6 °C) (08/17/20 0733)  Temp Source: Temporal (08/15/20 1959)  Respirations: 19 (08/17/20 0734)  Height: 5' 10" (177 8 cm) (08/15/20 2158)  Weight - Scale: 68 kg (150 lb) (08/17/20 0542)  SpO2: 98 % (08/17/20 0733)     Physical Exam  Vitals signs reviewed  HENT:      Head: Atraumatic  Eyes:      Extraocular Movements: Extraocular movements intact  Conjunctiva/sclera: Conjunctivae normal    Cardiovascular:      Rate and Rhythm: Regular rhythm  Pulmonary:      Breath sounds: Normal breath sounds  No wheezing or rhonchi  Abdominal:      General: There is no distension  Palpations: Abdomen is soft  Tenderness: There is no abdominal tenderness  Musculoskeletal:      Comments: +1 edema lower extremities bilaterally   Skin:     General: Skin is warm  Coloration: Skin is not jaundiced  Neurological:      General: No focal deficit present  Mental Status: He is alert and oriented to person, place, and time     Psychiatric: Mood and Affect: Mood normal      Planned Re-admission:  No  Discharge Disposition: Left against medical advice or discontinued care    Test Results Pending at Discharge:   Pending Labs     Order Current Status    Blood culture Preliminary result    Blood culture Preliminary result    Blood culture #1 Preliminary result    Blood culture #2 Preliminary result      Incidental findings:     Medications   · Discharge Medication List: See after visit summary for reconciled discharge medications  Diet restrictions:  Regular house diet   Activity restrictions: No strenuous activity  Discharge Condition: poor    Outpatient Follow-Up and Discharge Instructions  See after visit summary section titled Discharge Instructions for information provided to patient and family  Code Status: Level 1 - Full Code  Discharge Statement   I spent 45 minutes discharging the patient  This time was spent on the day of discharge  Greater than 50% of total time was spent with the patient and / or family counseling and / or coordination of care  ** Please Note: This note has been constructed using a voice recognition system   **

## 2020-08-17 NOTE — DISCHARGE INSTR - AVS FIRST PAGE
· Fungemia  This can be deadly  You are leaving against medical advice without close monitoring  You will be discharged with fluconazole 400 mg daily for 14 days  This can affect your liver and cause it to fail without CLOSE monitoring in hospital   Please follow-up with PCP as soon as possible or return to the emergency department should symptoms worsen

## 2020-08-17 NOTE — ASSESSMENT & PLAN NOTE
· Fungemia unclear source  Patient known to leave AMA on multiple occasions  · Initially presented to hospital and was admitted on 8/11/2020 with septic shock requiring vasopressors  He then left against medical advice  · Fungemia may have been from translocation from esophagus  · Was seen by infectious disease and started on IV fluconazole with much improvement in symptoms  · Unfortunate patient is demanding to leave against medical advice at this time understanding that treatment with fluconazole may cause worsening liver failure which may result in death    · Leaving AMA however he will be prescribed fluconazole 400 mg daily for 14 days and advised to follow-up with PCP as soon as possible as he needs close monitoring of his liver function

## 2020-08-17 NOTE — ASSESSMENT & PLAN NOTE
· Patient leaving against medical advise  Signed paper form    Understands risk of liver failure sepsis and death

## 2020-08-18 ENCOUNTER — HOSPITAL ENCOUNTER (INPATIENT)
Facility: HOSPITAL | Age: 60
LOS: 2 days | Discharge: HOME | DRG: 868 | End: 2020-08-20
Attending: EMERGENCY MEDICINE | Admitting: INTERNAL MEDICINE
Payer: COMMERCIAL

## 2020-08-18 ENCOUNTER — PATIENT OUTREACH (OUTPATIENT)
Dept: CASE MANAGEMENT | Facility: OTHER | Age: 60
End: 2020-08-18

## 2020-08-18 ENCOUNTER — HOSPITAL ENCOUNTER (EMERGENCY)
Facility: HOSPITAL | Age: 60
Discharge: HOME | End: 2020-08-18
Attending: EMERGENCY MEDICINE | Admitting: EMERGENCY MEDICINE
Payer: COMMERCIAL

## 2020-08-18 VITALS
RESPIRATION RATE: 20 BRPM | HEART RATE: 69 BPM | OXYGEN SATURATION: 100 % | HEIGHT: 72 IN | BODY MASS INDEX: 20.18 KG/M2 | WEIGHT: 149 LBS | SYSTOLIC BLOOD PRESSURE: 143 MMHG | TEMPERATURE: 98.2 F | DIASTOLIC BLOOD PRESSURE: 67 MMHG

## 2020-08-18 DIAGNOSIS — K70.31 ALCOHOLIC CIRRHOSIS OF LIVER WITH ASCITES (HCC): ICD-10-CM

## 2020-08-18 DIAGNOSIS — B49 FUNGEMIA: Primary | ICD-10-CM

## 2020-08-18 DIAGNOSIS — R14.0 ABDOMINAL BLOATING: ICD-10-CM

## 2020-08-18 DIAGNOSIS — R13.10 DYSPHAGIA: ICD-10-CM

## 2020-08-18 DIAGNOSIS — R18.8 ASCITES: ICD-10-CM

## 2020-08-18 LAB
ALBUMIN SERPL BCP-MCNC: 3 G/DL (ref 3.5–5)
ALP SERPL-CCNC: 137 U/L (ref 46–116)
ALT SERPL W P-5'-P-CCNC: 18 U/L (ref 12–78)
ANION GAP SERPL CALCULATED.3IONS-SCNC: 7 MMOL/L (ref 4–13)
APTT PPP: 34 SECONDS (ref 23–37)
AST SERPL W P-5'-P-CCNC: 18 U/L (ref 5–45)
ATRIAL RATE: 59 BPM
BACTERIA BLD CULT: ABNORMAL
BACTERIA BLD CULT: ABNORMAL
BASOPHILS # BLD AUTO: 0.01 THOUSANDS/ΜL (ref 0–0.1)
BASOPHILS NFR BLD AUTO: 1 % (ref 0–1)
BILIRUB SERPL-MCNC: 0.5 MG/DL (ref 0.2–1)
BUN SERPL-MCNC: 5 MG/DL (ref 5–25)
CALCIUM SERPL-MCNC: 7.8 MG/DL (ref 8.3–10.1)
CHLORIDE SERPL-SCNC: 104 MMOL/L (ref 100–108)
CO2 SERPL-SCNC: 28 MMOL/L (ref 21–32)
CREAT SERPL-MCNC: 0.86 MG/DL (ref 0.6–1.3)
EOSINOPHIL # BLD AUTO: 0.06 THOUSAND/ΜL (ref 0–0.61)
EOSINOPHIL NFR BLD AUTO: 3 % (ref 0–6)
ERYTHROCYTE [DISTWIDTH] IN BLOOD BY AUTOMATED COUNT: 20.6 % (ref 11.6–15.1)
GFR SERPL CREATININE-BSD FRML MDRD: 95 ML/MIN/1.73SQ M
GLUCOSE SERPL-MCNC: 92 MG/DL (ref 65–140)
GRAM STN SPEC: ABNORMAL
GRAM STN SPEC: ABNORMAL
HCT VFR BLD AUTO: 26.8 % (ref 36.5–49.3)
HGB BLD-MCNC: 8 G/DL (ref 12–17)
IMM GRANULOCYTES # BLD AUTO: 0.01 THOUSAND/UL (ref 0–0.2)
IMM GRANULOCYTES NFR BLD AUTO: 1 % (ref 0–2)
INR PPP: 1.34 (ref 0.84–1.19)
LYMPHOCYTES # BLD AUTO: 0.55 THOUSANDS/ΜL (ref 0.6–4.47)
LYMPHOCYTES NFR BLD AUTO: 25 % (ref 14–44)
MCH RBC QN AUTO: 22.3 PG (ref 26.8–34.3)
MCHC RBC AUTO-ENTMCNC: 29.9 G/DL (ref 31.4–37.4)
MCV RBC AUTO: 75 FL (ref 82–98)
MONOCYTES # BLD AUTO: 0.19 THOUSAND/ΜL (ref 0.17–1.22)
MONOCYTES NFR BLD AUTO: 9 % (ref 4–12)
NEUTROPHILS # BLD AUTO: 1.35 THOUSANDS/ΜL (ref 1.85–7.62)
NEUTS SEG NFR BLD AUTO: 61 % (ref 43–75)
NRBC BLD AUTO-RTO: 0 /100 WBCS
P AXIS: 59 DEGREES
PLATELET # BLD AUTO: 50 THOUSANDS/UL (ref 149–390)
POTASSIUM SERPL-SCNC: 3.9 MMOL/L (ref 3.5–5.3)
PR INTERVAL: 194 MS
PROT SERPL-MCNC: 6.6 G/DL (ref 6.4–8.2)
PROTHROMBIN TIME: 16.6 SECONDS (ref 11.6–14.5)
QRS AXIS: 87 DEGREES
QRSD INTERVAL: 92 MS
QT INTERVAL: 462 MS
QTC INTERVAL: 457 MS
RBC # BLD AUTO: 3.58 MILLION/UL (ref 3.88–5.62)
SODIUM SERPL-SCNC: 139 MMOL/L (ref 136–145)
T WAVE AXIS: -11 DEGREES
TROPONIN I SERPL-MCNC: 0.02 NG/ML
VENTRICULAR RATE: 59 BPM
WBC # BLD AUTO: 2.17 THOUSAND/UL (ref 4.31–10.16)

## 2020-08-18 PROCEDURE — 93010 ELECTROCARDIOGRAM REPORT: CPT | Performed by: INTERNAL MEDICINE

## 2020-08-18 PROCEDURE — 99285 EMERGENCY DEPT VISIT HI MDM: CPT | Performed by: EMERGENCY MEDICINE

## 2020-08-18 PROCEDURE — 84484 ASSAY OF TROPONIN QUANT: CPT | Performed by: EMERGENCY MEDICINE

## 2020-08-18 PROCEDURE — 36415 COLL VENOUS BLD VENIPUNCTURE: CPT | Performed by: EMERGENCY MEDICINE

## 2020-08-18 PROCEDURE — 93005 ELECTROCARDIOGRAM TRACING: CPT

## 2020-08-18 PROCEDURE — 99223 1ST HOSP IP/OBS HIGH 75: CPT | Performed by: INTERNAL MEDICINE

## 2020-08-18 PROCEDURE — 85730 THROMBOPLASTIN TIME PARTIAL: CPT | Performed by: EMERGENCY MEDICINE

## 2020-08-18 PROCEDURE — 85610 PROTHROMBIN TIME: CPT | Performed by: EMERGENCY MEDICINE

## 2020-08-18 PROCEDURE — 99284 EMERGENCY DEPT VISIT MOD MDM: CPT

## 2020-08-18 PROCEDURE — 80053 COMPREHEN METABOLIC PANEL: CPT | Performed by: EMERGENCY MEDICINE

## 2020-08-18 PROCEDURE — 99285 EMERGENCY DEPT VISIT HI MDM: CPT | Performed by: PHYSICIAN ASSISTANT

## 2020-08-18 PROCEDURE — 85025 COMPLETE CBC W/AUTO DIFF WBC: CPT | Performed by: EMERGENCY MEDICINE

## 2020-08-18 RX ORDER — LORAZEPAM 1 MG/1
1 TABLET ORAL
Status: DISCONTINUED | OUTPATIENT
Start: 2020-08-18 | End: 2020-08-20 | Stop reason: HOSPADM

## 2020-08-18 RX ORDER — FLUCONAZOLE 2 MG/ML
400 INJECTION, SOLUTION INTRAVENOUS DAILY
Status: DISCONTINUED | OUTPATIENT
Start: 2020-08-19 | End: 2020-08-20 | Stop reason: HOSPADM

## 2020-08-18 RX ORDER — FLUCONAZOLE 2 MG/ML
400 INJECTION, SOLUTION INTRAVENOUS ONCE
Status: COMPLETED | OUTPATIENT
Start: 2020-08-18 | End: 2020-08-18

## 2020-08-18 RX ORDER — OXYCODONE HYDROCHLORIDE 5 MG/1
10 TABLET ORAL 3 TIMES DAILY
Status: DISCONTINUED | OUTPATIENT
Start: 2020-08-18 | End: 2020-08-20 | Stop reason: HOSPADM

## 2020-08-18 RX ORDER — ALBUTEROL SULFATE 90 UG/1
2 AEROSOL, METERED RESPIRATORY (INHALATION) EVERY 4 HOURS PRN
Status: DISCONTINUED | OUTPATIENT
Start: 2020-08-18 | End: 2020-08-20 | Stop reason: HOSPADM

## 2020-08-18 RX ORDER — PROPRANOLOL HYDROCHLORIDE 20 MG/1
20 TABLET ORAL DAILY
Status: DISCONTINUED | OUTPATIENT
Start: 2020-08-19 | End: 2020-08-20 | Stop reason: HOSPADM

## 2020-08-18 RX ORDER — ONDANSETRON 2 MG/ML
4 INJECTION INTRAMUSCULAR; INTRAVENOUS EVERY 4 HOURS PRN
Status: DISCONTINUED | OUTPATIENT
Start: 2020-08-18 | End: 2020-08-20 | Stop reason: HOSPADM

## 2020-08-18 RX ORDER — SPIRONOLACTONE 25 MG/1
50 TABLET ORAL DAILY
Status: DISCONTINUED | OUTPATIENT
Start: 2020-08-19 | End: 2020-08-20 | Stop reason: HOSPADM

## 2020-08-18 RX ORDER — PANTOPRAZOLE SODIUM 40 MG/1
40 TABLET, DELAYED RELEASE ORAL DAILY
Status: DISCONTINUED | OUTPATIENT
Start: 2020-08-19 | End: 2020-08-20 | Stop reason: HOSPADM

## 2020-08-18 RX ORDER — LACTULOSE 20 G/30ML
20 SOLUTION ORAL 3 TIMES DAILY
Status: DISCONTINUED | OUTPATIENT
Start: 2020-08-18 | End: 2020-08-20 | Stop reason: HOSPADM

## 2020-08-18 RX ADMIN — OXYCODONE HYDROCHLORIDE 10 MG: 5 TABLET ORAL at 18:49

## 2020-08-18 RX ADMIN — FLUCONAZOLE 400 MG: 2 INJECTION, SOLUTION INTRAVENOUS at 16:32

## 2020-08-18 RX ADMIN — LORAZEPAM 1 MG: 1 TABLET ORAL at 21:03

## 2020-08-18 NOTE — ASSESSMENT & PLAN NOTE
· Esophageal dysphagia    Patient reports having history of esophageal dilatation however not found in recent reports  · Will have GI re-evaluate

## 2020-08-18 NOTE — ASSESSMENT & PLAN NOTE
· Alcoholic cirrhosis of liver with ascites  Recently hospitalized here for fungemia but left against medical advice  · Reports of increasing abdominal distension    IR consult placed in the ER for evaluation of paracentesis  · Continue propranolol and spironolactone for history of portal hypertension

## 2020-08-18 NOTE — PROGRESS NOTES
In basket notification received of BPCI episode  Chart reviewed  Patient was readmitted today  Will follow for discharge and will outreach at that time

## 2020-08-18 NOTE — ED PROVIDER NOTES
History  Chief Complaint   Patient presents with   [facility]     patient presents to the ED with c/o abdominal bloating and SOB, states he has been tapped before and this feels similar        History provided by:  Patient   used: No      Patient is a 64-year-old male presenting to emergency department due to abdominal bloating  States he has noticed his ascites have increased  Makes it difficult to take a deep breath  He was just in the hospital for fungemia  Was on IV fluconazole  Was waiting for cultures to clear  Left against medical advice yesterday  Denies any abdominal pain  No fevers or chills  Nausea vomiting  No chest pain  Normal bowel movements for him  No urine complaints  States he would like to get his abdomen tapped and go home  MDM will check liver enzymes, coags, CBC, EKG trop admit patient to the hospital and IR consult    Patient states he would like to go home  Will leave against medical advice  Does not want to wait for blood work results  States he will return later  Will sign out against medical advice    Prior to Admission Medications   Prescriptions Last Dose Informant Patient Reported? Taking?    albuterol (2 5 mg/3 mL) 0 083 % nebulizer solution  Self Yes No   Sig: Take 2 5 mg by nebulization every 6 (six) hours as needed for wheezing or shortness of breath   albuterol (PROVENTIL HFA,VENTOLIN HFA) 90 mcg/act inhaler  Self Yes No   Sig: Inhale 2 puffs as needed for shortness of breath   fluconazole (DIFLUCAN) 200 mg tablet   No No   Sig: Take 2 tablets (400 mg total) by mouth daily for 14 days   lactulose 20 g/30 mL  Self No No   Sig: Take 30 mL (20 g total) by mouth 3 (three) times a day   pantoprazole (PROTONIX) 40 mg tablet  Self No No   Sig: Take 1 tablet (40 mg total) by mouth daily   propranolol (INDERAL) 20 mg tablet  Self No No   Sig: Take 1 tablet (20 mg total) by mouth daily   Patient not taking: Reported on 8/15/2020   spironolactone (ALDACTONE) 50 mg tablet   No No   Sig: Take 1 tablet (50 mg total) by mouth daily   Patient not taking: Reported on 8/15/2020      Facility-Administered Medications: None       Past Medical History:   Diagnosis Date    Ascites     Cardiac disease     Chronic left-sided headaches     Chronic pain     back and neck    Chronic pain disorder     COPD (chronic obstructive pulmonary disease) (HCC)     Esophageal varices in cirrhosis (HCC)     Fungemia     History of transfusion     Hypertension     Hypokalemia 1/3/2020       Past Surgical History:   Procedure Laterality Date    BACK SURGERY      CERVICAL FUSION      CHOLECYSTECTOMY      COLONOSCOPY  11/23/2019    Internal external hemorrhoids, nonbleeding rectal varices    CORONARY ANGIOPLASTY WITH STENT PLACEMENT      2006-PTCA/STENT to mid and distal RCA; then 2009 Left-LAD normal, circumflex-normal,Proximal % stnosis, Chronic total occlusion    EGD  01/11/2006    GALLBLADDER SURGERY      HERNIA REPAIR      IR PARACENTESIS  7/15/2020    IR PARACENTESIS  8/13/2020    JOINT REPLACEMENT Left     knee    KNEE SURGERY      NECK SURGERY      PARACENTESIS      GV    UPPER GASTROINTESTINAL ENDOSCOPY  01/11/2006    Gastritis and duodenitis  Pathology negative for any abnormality    UPPER GASTROINTESTINAL ENDOSCOPY  11/23/2019    Grade 3 esophageal varices and portal hypertensive gastropathy       Family History   Problem Relation Age of Onset    Heart disease Father     Heart disease Brother     Cardiomyopathy Brother     Colon polyps Neg Hx     Colon cancer Neg Hx      I have reviewed and agree with the history as documented      E-Cigarette/Vaping    E-Cigarette Use Never User      E-Cigarette/Vaping Substances    Nicotine No     THC No     CBD No     Flavoring No     Other No     Unknown No      Social History     Tobacco Use    Smoking status: Current Every Day Smoker     Packs/day: 3 50     Years: 45 00     Pack years: 157 50 Types: Cigarettes    Smokeless tobacco: Never Used    Tobacco comment: encouraged smoking cessation   Substance Use Topics    Alcohol use: Not Currently    Drug use: No       Review of Systems   Constitutional: Negative for chills, diaphoresis and fever  HENT: Negative for congestion and sore throat  Respiratory: Negative for cough, shortness of breath, wheezing and stridor  Cardiovascular: Negative for chest pain, palpitations and leg swelling  Gastrointestinal: Negative for abdominal pain, blood in stool, diarrhea, nausea and vomiting  Genitourinary: Negative for dysuria, frequency and urgency  Musculoskeletal: Negative for neck pain and neck stiffness  Skin: Negative for pallor and rash  Neurological: Negative for dizziness, syncope, weakness, light-headedness and headaches  All other systems reviewed and are negative  Physical Exam  Physical Exam  Vitals signs reviewed  HENT:      Head: Normocephalic  Nose: Nose normal       Mouth/Throat:      Mouth: Mucous membranes are moist    Eyes:      Pupils: Pupils are equal, round, and reactive to light  Neck:      Musculoskeletal: Normal range of motion  Cardiovascular:      Rate and Rhythm: Normal rate and regular rhythm  Pulmonary:      Effort: Pulmonary effort is normal       Breath sounds: Normal breath sounds  Abdominal:      General: There is distension  Tenderness: There is no abdominal tenderness  Comments: Ascites   Musculoskeletal: Normal range of motion  General: No tenderness, deformity or signs of injury  Skin:     General: Skin is warm  Capillary Refill: Capillary refill takes less than 2 seconds  Neurological:      General: No focal deficit present  Mental Status: He is alert           Vital Signs  ED Triage Vitals [08/18/20 1229]   Temperature Pulse Respirations Blood Pressure SpO2   98 2 °F (36 8 °C) 69 20 143/67 100 %      Temp Source Heart Rate Source Patient Position - Orthostatic VS BP Location FiO2 (%)   Temporal Monitor Sitting Right arm --      Pain Score       7           Vitals:    08/18/20 1229   BP: 143/67   Pulse: 69   Patient Position - Orthostatic VS: Sitting         Visual Acuity      ED Medications  Medications - No data to display    Diagnostic Studies  Results Reviewed     Procedure Component Value Units Date/Time    Comprehensive metabolic panel [[id number]]  (Abnormal) Collected:  08/18/20 1322    Lab Status:  Final result Specimen:  Blood from Arm, Left Updated:  08/18/20 1402     Sodium 139 mmol/L      Potassium 3 9 mmol/L      Chloride 104 mmol/L      CO2 28 mmol/L      ANION GAP 7 mmol/L      BUN 5 mg/dL      Creatinine 0 86 mg/dL      Glucose 92 mg/dL      Calcium 7 8 mg/dL      AST 18 U/L      ALT 18 U/L      Alkaline Phosphatase 137 U/L      Total Protein 6 6 g/dL      Albumin 3 0 g/dL      Total Bilirubin 0 50 mg/dL      eGFR 95 ml/min/1 73sq m     Narrative:       [place] guidelines for Chronic Kidney Disease (CKD):     Stage 1 with normal or high GFR (GFR > 90 mL/min/1 73 square meters)    Stage 2 Mild CKD (GFR = 60-89 mL/min/1 73 square meters)    Stage 3A Moderate CKD (GFR = 45-59 mL/min/1 73 square meters)    Stage 3B Moderate CKD (GFR = 30-44 mL/min/1 73 square meters)    Stage 4 Severe CKD (GFR = 15-29 mL/min/1 73 square meters)    Stage 5 End Stage CKD (GFR <15 mL/min/1 73 square meters)  Note: GFR calculation is accurate only with a steady state creatinine    Troponin I [[id number]]  (Normal) Collected:  08/18/20 1322    Lab Status:  Final result Specimen:  Blood from Arm, Left Updated:  08/18/20 1351     Troponin I 0 02 ng/mL     APTT [[id number]]  (Normal) Collected:  08/18/20 1322    Lab Status:  Final result Specimen:  Blood from Arm, Left Updated:  08/18/20 1346     PTT 34 seconds     Protime-INR [[id number]]  (Abnormal) Collected:  08/18/20 1322    Lab Status:  Final result Specimen:  Blood from Arm, Left Updated:  08/18/20 1346     Protime 16 6 seconds      INR 1 34    CBC and differential [[id number]]  (Abnormal) Collected:  08/18/20 1322    Lab Status:  Final result Specimen:  Blood from Arm, Left Updated:  08/18/20 1332     WBC 2 17 Thousand/uL      RBC 3 58 Million/uL      Hemoglobin 8 0 g/dL      Hematocrit 26 8 %      MCV 75 fL      MCH 22 3 pg      MCHC 29 9 g/dL      RDW 20 6 %      Platelets 50 Thousands/uL      nRBC 0 /100 WBCs      Neutrophils Relative 61 %      Immat GRANS % 1 %      Lymphocytes Relative 25 %      Monocytes Relative 9 %      Eosinophils Relative 3 %      Basophils Relative 1 %      Neutrophils Absolute 1 35 Thousands/µL      Immature Grans Absolute 0 01 Thousand/uL      Lymphocytes Absolute 0 55 Thousands/µL      Monocytes Absolute 0 19 Thousand/µL      Eosinophils Absolute 0 06 Thousand/µL      Basophils Absolute 0 01 Thousands/µL                  No orders to display              Procedures  Procedures         ED Course  ED Course as of Aug 18 1926   Tue Aug 18, 2020   1317 ECG shows rate of 59, sinus, normal axis, normal QRS, nonspecific ST and T-waves throughout, independently interpreted by me          US AUDIT      Most Recent Value   Initial Alcohol Screen: US AUDIT-C    1  How often do you have a drink containing alcohol?  0 Filed at: 08/18/2020 1230   2  How many drinks containing alcohol do you have on a typical day you are drinking? 0 Filed at: 08/18/2020 1230   3a  Male UNDER 65: How often do you have five or more drinks on one occasion? 0 Filed at: 08/18/2020 1230   3b  FEMALE Any Age, or MALE 65+: How often do you have 4 or more drinks on one occassion? 0 Filed at: 08/18/2020 1230   Audit-C Score  0 Filed at: 08/18/2020 1230                  AUSTIN/DAST-10      Most Recent Value   How many times in the past year have you    Used an illegal drug or used a prescription medication for non-medical reasons?   Never Filed at: 08/18/2020 1230 MDM      Disposition  Final diagnoses:   Fungemia   Ascites     Time reflects when diagnosis was documented in both MDM as applicable and the Disposition within this note     Time User Action Codes Description Comment    8/18/2020  1:24 PM Tadevosyan, Delfina Add [R18 8] Other ascites     8/18/2020  1:24 PM Tadevosyan, Delfina Add [B49] Fungemia     8/18/2020  1:25 PM Tadevosyan, Delfina Modify [B49] Fungemia     8/18/2020  1:25 PM Tadevosyan, Delfina Remove [R18 8] Other ascites     8/18/2020  1:25 PM Tadevosyan, Delfina Add [R18 8] Ascites       ED Disposition     ED Disposition Condition Date/Time Comment    ROGER Keyes Aug 18, 2020  1:24 PM Date: 8/18/2020  Patient: Gloria Bhatti  Admitted: 8/18/2020 12:25 PM  Attending Provider: MD Gloria Marc or his authorized caregiver has made the decision for the patient to leave the emergency department against the advice of his att ending physician  He or his authorized caregiver has been informed and understands the inherent risks, including death, sepsis, multiorgan failure, seizure, stroke, coma  He or his authorized caregiver has decided to accept the responsibility for th is decision  Saschapeter Bhatti and all necessary parties have been advised that he may return for further evaluation or treatment  His condition at time of discharge was serious  Gloria Bhatti had current vital signs as follows:  /67 (BP Location: Right  arm)   Pulse 69   Temp 98 2 °F (36 8 °C) (Temporal)   Resp 20   Ht 6' (1 829 m)   Wt 67 6 kg (149 lb)         Follow-up Information     Follow up With Specialties Details Why Contact Info Additional Information    Agustin Human  Call  Please call and [facility]    [address] Emergency Department Emergency Medicine  If you change your mind and would like to get admitted to the hospital [facility] [address]  [phone] [address] ED, [address] [facility], [facility], [address]          Discharge Medication List as of 8/18/2020  1:27 PM      CONTINUE these medications which have NOT CHANGED    Details   albuterol (2 5 mg/3 mL) 0 083 % nebulizer solution Take 2 5 mg by nebulization every 6 (six) hours as needed for wheezing or shortness of breath, Historical Med      albuterol (PROVENTIL HFA,VENTOLIN HFA) 90 mcg/act inhaler Inhale 2 puffs as needed for shortness of breath, Historical Med      fluconazole (DIFLUCAN) 200 mg tablet Take 2 tablets (400 mg total) by mouth daily for 14 days, Starting Mon 8/17/2020, Until Mon 8/31/2020, Normal      lactulose 20 g/30 mL Take 30 mL (20 g total) by mouth 3 (three) times a day, Starting Wed 7/8/2020, Until Wed 8/12/2020, Normal      pantoprazole (PROTONIX) 40 mg tablet Take 1 tablet (40 mg total) by mouth daily, Starting Wed 6/17/2020, Normal      propranolol (INDERAL) 20 mg tablet Take 1 tablet (20 mg total) by mouth daily, Starting Thu 5/7/2020, Normal      spironolactone (ALDACTONE) 50 mg tablet Take 1 tablet (50 mg total) by mouth daily, Starting Fri 7/31/2020, Until Thu 10/29/2020, Normal           No discharge procedures on file      PDMP Review       Value Time User    PDMP Reviewed  Yes 8/18/2020  6:34 PM Beatrice Shaw DO          ED Provider  Electronically Signed by           Phong Paz MD  08/18/20 4087

## 2020-08-18 NOTE — ED NOTES
Entered patients room to administer IV ABX, patient had his clothing back on  Requested that patient put hospital gown back on for admission, patient states "I am just going to put my clothes back on when I get up there anyway so I dont see what the big deal is"  Patient proceeds to put hospital gown on over t shirt        Julieta William, RN  08/18/20 7062

## 2020-08-18 NOTE — ED PROVIDER NOTES
History  Chief Complaint   Patient presents with   [facility]     patient presents to the ED withj c/o continous bloating  left from ED earlier today AMA and returns now to be admitted      Patient is a 61 y/o M with h/o COPD, HTN, cirrhosis, esophageal varices that returns to the ED after signing out AMA twice from the hospital  Patient was initially admitted to the hospital on 8/12 for septic shock, signed out AMA and returned on 8/15 due to fungemia  He was started on IV fluconazole with blood cultures still pending, but signed out [facility] 8/17  He states he just wanted to take the oral medication because he was feeling better, but when he got home and saw the size of the medication he knew he would not be able to swallow them due to esophageal varices  He also states he ascites is worsening and would like to get the fluid drained  He was seen in the ER this morning and had blood work which showed a WBC of 2 17, Hg 8, Platelets 50  On CMP Ca only 7 8  Troponin was normal   Final result of repeat blood cultures still pending, will restart IV fluconazole  History provided by:  Patient      Prior to Admission Medications   Prescriptions Last Dose Informant Patient Reported? Taking?    albuterol (2 5 mg/3 mL) 0 083 % nebulizer solution  Self Yes No   Sig: Take 2 5 mg by nebulization every 6 (six) hours as needed for wheezing or shortness of breath   albuterol (PROVENTIL HFA,VENTOLIN HFA) 90 mcg/act inhaler  Self Yes No   Sig: Inhale 2 puffs as needed for shortness of breath   fluconazole (DIFLUCAN) 200 mg tablet   No No   Sig: Take 2 tablets (400 mg total) by mouth daily for 14 days   lactulose 20 g/30 mL  Self No No   Sig: Take 30 mL (20 g total) by mouth 3 (three) times a day   pantoprazole (PROTONIX) 40 mg tablet  Self No No   Sig: Take 1 tablet (40 mg total) by mouth daily   propranolol (INDERAL) 20 mg tablet  Self No No   Sig: Take 1 tablet (20 mg total) by mouth daily   Patient not taking: Reported on 8/15/2020   spironolactone (ALDACTONE) 50 mg tablet   No No   Sig: Take 1 tablet (50 mg total) by mouth daily   Patient not taking: Reported on 8/15/2020      Facility-Administered Medications: None       Past Medical History:   Diagnosis Date    Ascites     Cardiac disease     Chronic left-sided headaches     Chronic pain     back and neck    Chronic pain disorder     COPD (chronic obstructive pulmonary disease) (HCC)     Esophageal varices in cirrhosis (HCC)     History of transfusion     Hypertension     Hypokalemia 1/3/2020       Past Surgical History:   Procedure Laterality Date    BACK SURGERY      CERVICAL FUSION      CHOLECYSTECTOMY      COLONOSCOPY  11/23/2019    Internal external hemorrhoids, nonbleeding rectal varices    CORONARY ANGIOPLASTY WITH STENT PLACEMENT      2006-PTCA/STENT to mid and distal RCA; then 2009 Left-LAD normal, circumflex-normal,Proximal % stnosis, Chronic total occlusion    EGD  01/11/2006    GALLBLADDER SURGERY      HERNIA REPAIR      IR PARACENTESIS  7/15/2020    IR PARACENTESIS  8/13/2020    JOINT REPLACEMENT Left     knee    KNEE SURGERY      NECK SURGERY      [facility]ACENTESIS      [facility]    UPPER GASTROINTESTINAL ENDOSCOPY  01/11/2006    Gastritis and duodenitis  Pathology negative for any abnormality    UPPER GASTROINTESTINAL ENDOSCOPY  11/23/2019    Grade 3 esophageal varices and portal hypertensive gastropathy       Family History   Problem Relation Age of Onset    Heart disease Father     Heart disease Brother     Cardiomyopathy Brother     Colon polyps Neg Hx     Colon cancer Neg Hx      I have reviewed and agree with the history as documented      E-Cigarette/Vaping    E-Cigarette Use Never User      E-Cigarette/Vaping Substances    Nicotine No     THC No     CBD No     Flavoring No     Other No     Unknown No      Social History     Tobacco Use    Smoking status: Current Every Day Smoker     Packs/day: 3 50     Years: 45 00     Pack years: 157 50     Types: Cigarettes    Smokeless tobacco: Never Used    Tobacco comment: encouraged smoking cessation   Substance Use Topics    Alcohol use: Not Currently    Drug use: No       Review of Systems   Constitutional: Negative for chills and fever  HENT: Negative  Respiratory: Positive for shortness of breath  Negative for cough  Cardiovascular: Negative for chest pain and leg swelling  Gastrointestinal: Negative for abdominal pain, diarrhea, nausea and vomiting  Genitourinary: Negative for dysuria  Musculoskeletal: Negative for back pain and neck pain  Skin: Negative for color change and rash  Neurological: Negative for dizziness, weakness, light-headedness and numbness  Psychiatric/Behavioral: Negative for confusion  All other systems reviewed and are negative  Physical Exam  Physical Exam  Vitals signs and nursing note reviewed  Constitutional:       General: He is not in acute distress  Appearance: Normal appearance  He is well-developed and well-groomed  Eyes:      General: Lids are normal    Neck:      Musculoskeletal: Normal range of motion  Cardiovascular:      Rate and Rhythm: Normal rate and regular rhythm  Heart sounds: Normal heart sounds  Pulmonary:      Effort: Pulmonary effort is normal       Breath sounds: Decreased breath sounds present  Abdominal:      General: Abdomen is protuberant  Bowel sounds are normal       Palpations: Abdomen is soft  Tenderness: There is no abdominal tenderness  Musculoskeletal: Normal range of motion  General: No swelling  Skin:     General: Skin is warm and dry  Coloration: Skin is jaundiced  Neurological:      Mental Status: He is alert and oriented to person, place, and time  GCS: GCS eye subscore is 4  GCS verbal subscore is 5  GCS motor subscore is 6  Sensory: Sensation is intact  Motor: Motor function is intact     Psychiatric:         Mood and Affect: Mood normal  Speech: Speech normal          Behavior: Behavior is cooperative  Vital Signs  ED Triage Vitals [08/18/20 1548]   Temperature Pulse Respirations Blood Pressure SpO2   98 4 °F (36 9 °C) 71 20 147/65 100 %      Temp Source Heart Rate Source Patient Position - Orthostatic VS BP Location FiO2 (%)   Temporal Monitor Sitting Right arm --      Pain Score       7           Vitals:    08/18/20 1548   BP: 147/65   Pulse: 71   Patient Position - Orthostatic VS: Sitting         Visual Acuity      ED Medications  Medications   fluconazole (DIFLUCAN) IVPB (premix) 400 mg 200 mL (400 mg Intravenous New Bag 8/18/20 1632)       Diagnostic Studies  Results Reviewed     None                 No orders to display              Procedures  Procedures         ED Course       US AUDIT      Most Recent Value   Initial Alcohol Screen: US AUDIT-C    1  How often do you have a drink containing alcohol?  0 Filed at: 08/18/2020 1549   2  How many drinks containing alcohol do you have on a typical day you are drinking? 0 Filed at: 08/18/2020 1549   3a  Male UNDER 65: How often do you have five or more drinks on one occasion? 0 Filed at: 08/18/2020 1549   3b  FEMALE Any Age, or MALE 65+: How often do you have 4 or more drinks on one occassion? 0 Filed at: 08/18/2020 1549   Audit-C Score  0 Filed at: 08/18/2020 1549                  AUSTIN/DAST-10      Most Recent Value   How many times in the past year have you    Used an illegal drug or used a prescription medication for non-medical reasons? Never Filed at: 08/18/2020 1549                                MDM  Number of Diagnoses or Management Options  Abdominal bloating: established and worsening  Ascites: established and worsening  Fungemia:   Diagnosis management comments: Patient with h/o fungemia, will restart IV fluconazole and admit  Patient with worsening ascites, will consult IR for paracentesis while inpatient          Amount and/or Complexity of Data Reviewed  Review and summarize past medical records: yes  Discuss the patient with other providers: yes    Patient Progress  Patient progress: stable        Disposition  Final diagnoses:   Fungemia   Ascites   Abdominal bloating     Time reflects when diagnosis was documented in both MDM as applicable and the Disposition within this note     Time User Action Codes Description Comment    8/18/2020  4:12 PM Rolly David Add [B49] Fungemia     8/18/2020  4:12 PM Rolly David Add [R18 8] Ascites     8/18/2020  4:12 PM [address], Karen Powers [R14 [id number]] Abdominal bloating       ED Disposition     ED Disposition Condition Date/Time Comment    Admit Stable Tue Aug 18, 2020  4:12 PM Case was discussed with Dr Leon Jefferson and the patient's admission status was agreed to be Admission Status: inpatient status to the service of Dr Leon Jefferson   Follow-up Information    None         Patient's Medications   Discharge Prescriptions    No medications on file     No discharge procedures on file      PDMP Review       Value Time User    PDMP Reviewed  Yes 8/16/2020  7:34 AM Marilynn Dimas MD          ED Provider  Electronically Signed by           Foreign Mendez PA-C  08/18/20 8896

## 2020-08-18 NOTE — H&P
H&P- Kiah Kelley 1960, 62 y o  male MRN: [id number]  Unit/Bed#: -01 Encounter: [id number]  Primary Care Provider: Ethan Castellanos   Date and time admitted to hospital: 8/18/2020  3:44 PM        Assessment and Plan  * Alcoholic cirrhosis of liver with ascites ([facility] )  Assessment & Plan  · Alcoholic cirrhosis of liver with ascites  Recently hospitalized here for fungemia but left against medical advice  · Reports of increasing abdominal distension  IR consult placed in the ER for evaluation of paracentesis  · Continue propranolol and spironolactone for history of portal hypertension    Fungemia  Assessment & Plan  · Recent admission for fungemia  · 8/11/2020 ED visit  Fever but requested to be discharged home  · 8/12/2020 admission  Admitted to ICU service for septic shock requiring vasopressors and was placed on ceftriaxone/metronidazole  Left AMA  · 8/15/2020 admission  Was called back for fungemia  Was on IV fluconazole but left against medical advice as echocardiogram was recommended as well as monitoring of liver function  Prescription for fluconazole 400 mg 14 day supply was sent to his pharmacy  · Order echocardiogram and have Infectious Disease re-evaluate    Gastroesophageal reflux disease  Assessment & Plan  · GERD with history of esophageal varices and banding  · Continue pantoprazole    Opioid dependence ([facility] )  Assessment & Plan  · Opioid dependence  Reviewed   On oxycodone 10 mg ##90 each month    Hepatic encephalopathy (HCC)  Assessment & Plan  · Hepatic encephalopathy continue lactulose    Esophageal dysphagia  Assessment & Plan  · Esophageal dysphagia    Patient reports having history of esophageal dilatation however not found in recent reports  · Will have GI re-evaluate    VTE Prophylaxis: Pharmacologic VTE Prophylaxis contraindicated due to thrombocytopenia  Code Status: Level 1 - Full Code  Anticipated Length of Stay:  Patient will be admitted on an Inpatient basis with an anticipated length of stay of  greater than 2 midnights  Justification for Hospital Stay: Alcoholic cirrhosis of liver with ascites ([facility] )  Total Time for Visit, including Counseling / Coordination of Care: x mins  Greater than 50% of this total time spent on direct patient counseling and coordination of care  Chief Complaint:     Chief Complaint   Patient presents with   [facility]     patient presents to the ED withj c/o continous bloating  left from ED earlier today AMA and returns now to be admitted      History of Present Illness:    Jos Mata is a 62 y o  male who RE-presents with with increased abdominal distention and bloating  The patient had several recent ED visits and hospitalizations  On 8/11/2020 he was seen in the ED with fever but requested discharge home  The next day he was admitted to intensive care unit for septic shock requiring vasopressors but left AMA  He was subsequently seen during hospitalization on 8/15-8/17 after being called back to the hospital for fungemia  He was started on fluconazole which needs close monitoring for hepatotoxicity especially with underlying cirrhosis  He left AMA on 8/17/2020 but prescription was called in for fluconazole 14 day supply  He has been having increased abdominal bloating today which prompted re-evaluation and admission  He denies any fevers or chills  He otherwise feels pretty good    He has had several bowel movements    Review of Systems:  History obtained from chart review and the patient  General ROS: negative for - chills or fever  Psychological ROS: negative for - hallucinations, hostility or irritability  Ophthalmic ROS: negative for - loss of vision or photophobia  Respiratory ROS: negative for - cough or shortness of breath  Cardiovascular ROS: negative for - chest pain  Gastrointestinal ROS: positive for - swallowing difficulty/pain, abdominal distension  Genito-Urinary ROS: negative for - hematuria  Musculoskeletal ROS: positive for - muscle pain  Neurological ROS: negative for - seizures or tremors  Otherwise, all other 12 point review of systems normal     Past Medical and Surgical History:   Past Medical History:   Diagnosis Date    Ascites     Cardiac disease     Chronic left-sided headaches     Chronic pain     back and neck    Chronic pain disorder     COPD (chronic obstructive pulmonary disease) (HCC)     Esophageal varices in cirrhosis (HCC)     Fungemia     History of transfusion     Hypertension     Hypokalemia 1/3/2020     Past Surgical History:   Procedure Laterality Date    BACK SURGERY      CERVICAL FUSION      CHOLECYSTECTOMY      COLONOSCOPY  11/23/2019    Internal external hemorrhoids, nonbleeding rectal varices    CORONARY ANGIOPLASTY WITH STENT PLACEMENT      2006-PTCA/STENT to mid and distal RCA; then 2009 Left-LAD normal, circumflex-normal,Proximal % stnosis, Chronic total occlusion    EGD  01/11/2006    GALLBLADDER SURGERY      HERNIA REPAIR      IR PARACENTESIS  7/15/2020    IR PARACENTESIS  8/13/2020    JOINT REPLACEMENT Left     knee    KNEE SURGERY      NECK SURGERY      PARACENTESIS      [facility]    UPPER GASTROINTESTINAL ENDOSCOPY  01/11/2006    Gastritis and duodenitis  Pathology negative for any abnormality    UPPER GASTROINTESTINAL ENDOSCOPY  11/23/2019    Grade 3 esophageal varices and portal hypertensive gastropathy     Meds/Allergies: Allergies: Allergies   Allergen Reactions    Flexeril [Cyclobenzaprine] Hallucinations           Morphine And Related Hives    Naprosyn [Naproxen] GI Intolerance    Ultram [Tramadol] GI Intolerance     Prior to Admission Medications   Prescriptions Last Dose Informant Patient Reported? Taking?    albuterol (2 5 mg/3 mL) 0 083 % nebulizer solution  Self Yes No   Sig: Take 2 5 mg by nebulization every 6 (six) hours as needed for wheezing or shortness of breath   albuterol (PROVENTIL HFA,VENTOLIN HFA) 90 mcg/act inhaler  Self Yes No Sig: Inhale 2 puffs as needed for shortness of breath   fluconazole (DIFLUCAN) 200 mg tablet   No No   Sig: Take 2 tablets (400 mg total) by mouth daily for 14 days   lactulose 20 g/30 mL  Self No Yes   Sig: Take 30 mL (20 g total) by mouth 3 (three) times a day   oxyCODONE HCl (ROXICODONE PO)   Yes Yes   Sig: Take 10 mg by mouth every 8 (eight) hours as needed for moderate pain   pantoprazole (PROTONIX) 40 mg tablet  Self No No   Sig: Take 1 tablet (40 mg total) by mouth daily   propranolol (INDERAL) 20 mg tablet  Self No No   Sig: Take 1 tablet (20 mg total) by mouth daily   Patient not taking: Reported on 8/15/2020   spironolactone (ALDACTONE) 50 mg tablet   No No   Sig: Take 1 tablet (50 mg total) by mouth daily   Patient not taking: Reported on 8/15/2020      Facility-Administered Medications: None     Social History:     Social History     Socioeconomic History    Marital status: /Civil Union     Spouse name: Not on file    Number of children: Not on file    Years of education: Not on file    Highest education level: Not on file   Occupational History    Not on file   Social Needs    Financial resource strain: Not on file    Food insecurity     Worry: Never true     Inability: Never true    Transportation needs     Medical: No     Non-medical: No   Tobacco Use    Smoking status: Current Every Day Smoker     Packs/day: 3 50     Years: 45 00     Pack years: 157 50     Types: Cigarettes    Smokeless tobacco: Never Used    Tobacco comment: encouraged smoking cessation   Substance and Sexual Activity    Alcohol use: Not Currently    Drug use: No    Sexual activity: Not on file   Lifestyle    Physical activity     Days per week: 0 days     Minutes per session: 0 min    Stress: Rather much   Relationships    Social connections     Talks on phone: Not on file     Gets together: Not on file     Attends [employer]ism service: Not on file     Active member of club or organization: Not on file Attends meetings of clubs or organizations: Not on file     Relationship status: Not on file    Intimate partner violence     Fear of current or ex partner: Not on file     Emotionally abused: Not on file     Physically abused: Not on file     Forced sexual activity: Not on file   Other Topics Concern    Not on file   Social History Narrative    Not on file     Patient Pre-hospital Living Situation:   Patient Pre-hospital Level of Mobility:   Patient Pre-hospital Diet Restrictions:     Family History:  Family History   Problem Relation Age of Onset    Heart disease Father     Heart disease Brother     Cardiomyopathy Brother     Colon polyps Neg Hx     Colon cancer Neg Hx      Physical Exam:   Vitals:   Blood Pressure: 143/69 (08/18/20 1841)  Pulse: 71 (08/18/20 1548)  Temperature: 98 4 °F (36 9 °C) (08/18/20 1548)  Temp Source: Temporal (08/18/20 1548)  Respirations: 18 (08/18/20 1841)  Height: 6' (182 9 cm) (08/18/20 1829)  Weight - Scale: 64 9 kg (143 lb) (08/18/20 1829)  SpO2: 100 % (08/18/20 1548)    Physical Exam  Vitals signs reviewed  Constitutional:       General: He is not in acute distress  HENT:      Head: Atraumatic  Eyes:      General: Scleral icterus present  Extraocular Movements: Extraocular movements intact  Pupils: Pupils are equal, round, and reactive to light  Cardiovascular:      Rate and Rhythm: Regular rhythm  Pulmonary:      Effort: Pulmonary effort is normal    Abdominal:      General: Bowel sounds are normal  There is distension  Palpations: Abdomen is soft  Tenderness: There is no abdominal tenderness  There is no rebound  Musculoskeletal:         General: Swelling (Lower extremities bilaterally) present  No tenderness  Skin:     General: Skin is warm and dry  Neurological:      General: No focal deficit present  Mental Status: He is alert and oriented to person, place, and time  Cranial Nerves: No cranial nerve deficit     Psychiatric: Mood and Affect: Mood normal        Lab Results: I have personally reviewed pertinent reports  Results from last 7 days   Lab Units 08/18/20  1322  08/14/20  0614   WBC Thousand/uL 2 17*   < > 4 40   HEMOGLOBIN g/dL 8 0*   < > 7 7*   HEMATOCRIT % 26 8*   < > 24 6*   PLATELETS Thousands/uL 50*   < > 34*   NEUTROS PCT % 61   < >  --    LYMPHS PCT % 25   < >  --    LYMPHO PCT   --    < > 10*   MONOS PCT % 9   < >  --    MONO PCT   --    < > 0*   EOS PCT % 3   < > 0   BANDS PCT %  --   --  4    < > = values in this interval not displayed       Results from last 7 days   Lab Units 08/18/20  1322 08/17/20  0549 08/16/20  0615 08/15/20  2012 08/14/20  0614 08/13/20  0518 08/12/20  1927 08/12/20  1619 08/11/20  1935   SODIUM mmol/L 139 140 139 138 138 137 131* 130* 132*   POTASSIUM mmol/L 3 9 3 8 3 6 4 1 3 4* 4 0 2 9* 3 0* 3 3*   CHLORIDE mmol/L 104 110* 110* 106 105 103 98* 96* 97*   CO2 mmol/L 28 25 24 25 25 19* 21 21 25   ANION GAP mmol/L 7 5 5 7 8 15* 12 13 10   BUN mg/dL 5 5 6 9 14 14 13 13 6   CREATININE mg/dL 0 86 0 79 0 71 0 91 0 77 1 29 1 35* 1 29 0 85   CALCIUM mg/dL 7 8* 7 2* 7 0* 7 6* 7 3* 6 8* 6 6* 8 0* 7 8*   ALBUMIN g/dL 3 0* 2 4* 2 5* 3 0* 2 3* 2 6* 2 5* 3 1*  --    TOTAL BILIRUBIN mg/dL 0 50 0 60 0 40 0 60 0 70 2 50* 2 50* 2 40*  --    ALK PHOS U/L 137* 120* 132* 143* 130* 161* 177* 246*  --    ALT U/L 18 17 18 23 21 29 21 21  --    AST U/L 18 17 18 27 33 61* 68* 53*  --    EGFR ml/min/1 73sq m 95 98 103 92 99 60 57 60 95   GLUCOSE RANDOM mg/dL 92 89 85 88 105 95 88 80 91     Results from last 7 days   Lab Units 08/18/20  1322   INR  1 34*     Results from last 7 days   Lab Units 08/18/20  1322 08/12/20  1619 08/12/20  1617   CK TOTAL U/L  --  79  --    TROPONIN I ng/mL 0 02  --  0 03     Results from last 7 days   Lab Units 08/15/20  2012 08/13/20  0524 08/12/20  2243 08/12/20  1927 08/12/20  1617   LACTIC ACID mmol/L 1 0 3 9* 3 5* 4 6* 5 1*              Results from last 7 days   Lab Units 08/12/20  2221   COLOR UA  Yellow   CLARITY UA  Clear   SPEC GRAV UA  <=1 005   PH UA  5 5   LEUKOCYTES UA  Negative   NITRITE UA  Negative   GLUCOSE UA mg/dl Negative   KETONES UA mg/dl Negative   BILIRUBIN UA  Negative   BLOOD UA  Small*      Results from last 7 days   Lab Units 08/12/20  2221   RBC UA /hpf 2-4*   WBC UA /hpf 0-1*   EPITHELIAL CELLS WET PREP /hpf Occasional   BACTERIA UA /hpf Occasional          Imaging:   No results found  EKG, Pathology, and Other Studies Reviewed on Admission:   EKG  Result Date: 08/18/20  Personally reviewed strips with impression of:  Sinus bradycardia 59 bpm    Allscripts/ Epic Records Reviewed: Yes    ** Please Note: This note has been constructed using a voice recognition system   **

## 2020-08-18 NOTE — ASSESSMENT & PLAN NOTE
· Recent admission for fungemia  · 8/11/2020 ED visit  Fever but requested to be discharged home  · 8/12/2020 admission  Admitted to ICU service for septic shock requiring vasopressors and was placed on ceftriaxone/metronidazole  Left AMA  · 8/15/2020 admission  Was called back for fungemia  Was on IV fluconazole but left against medical advice as echocardiogram was recommended as well as monitoring of liver function    Prescription for fluconazole 400 mg 14 day supply was sent to his pharmacy  · Order echocardiogram and have Infectious Disease re-evaluate

## 2020-08-19 ENCOUNTER — APPOINTMENT (INPATIENT)
Dept: ULTRASOUND IMAGING | Facility: HOSPITAL | Age: 60
DRG: 868 | End: 2020-08-19
Payer: COMMERCIAL

## 2020-08-19 ENCOUNTER — APPOINTMENT (INPATIENT)
Dept: NON INVASIVE DIAGNOSTICS | Facility: HOSPITAL | Age: 60
DRG: 868 | End: 2020-08-19
Payer: COMMERCIAL

## 2020-08-19 LAB
ALBUMIN SERPL BCP-MCNC: 2.4 G/DL (ref 3.5–5)
ALP SERPL-CCNC: 115 U/L (ref 46–116)
ALT SERPL W P-5'-P-CCNC: 14 U/L (ref 12–78)
ANION GAP SERPL CALCULATED.3IONS-SCNC: 6 MMOL/L (ref 4–13)
AST SERPL W P-5'-P-CCNC: 16 U/L (ref 5–45)
BILIRUB SERPL-MCNC: 0.5 MG/DL (ref 0.2–1)
BUN SERPL-MCNC: 3 MG/DL (ref 5–25)
CALCIUM SERPL-MCNC: 7.2 MG/DL (ref 8.3–10.1)
CHLORIDE SERPL-SCNC: 109 MMOL/L (ref 100–108)
CO2 SERPL-SCNC: 25 MMOL/L (ref 21–32)
CREAT SERPL-MCNC: 0.68 MG/DL (ref 0.6–1.3)
ERYTHROCYTE [DISTWIDTH] IN BLOOD BY AUTOMATED COUNT: 20.7 % (ref 11.6–15.1)
GFR SERPL CREATININE-BSD FRML MDRD: 105 ML/MIN/1.73SQ M
GLUCOSE SERPL-MCNC: 85 MG/DL (ref 65–140)
HCT VFR BLD AUTO: 23.7 % (ref 36.5–49.3)
HGB BLD-MCNC: 7.2 G/DL (ref 12–17)
MCH RBC QN AUTO: 22.6 PG (ref 26.8–34.3)
MCHC RBC AUTO-ENTMCNC: 30.4 G/DL (ref 31.4–37.4)
MCV RBC AUTO: 75 FL (ref 82–98)
PLATELET # BLD AUTO: 40 THOUSANDS/UL (ref 149–390)
PMV BLD AUTO: 10.8 FL (ref 8.9–12.7)
POTASSIUM SERPL-SCNC: 3.5 MMOL/L (ref 3.5–5.3)
PROT SERPL-MCNC: 5.7 G/DL (ref 6.4–8.2)
RBC # BLD AUTO: 3.18 MILLION/UL (ref 3.88–5.62)
SODIUM SERPL-SCNC: 140 MMOL/L (ref 136–145)
WBC # BLD AUTO: 1.35 THOUSAND/UL (ref 4.31–10.16)

## 2020-08-19 PROCEDURE — 99254 IP/OBS CNSLTJ NEW/EST MOD 60: CPT | Performed by: INTERNAL MEDICINE

## 2020-08-19 PROCEDURE — 85027 COMPLETE CBC AUTOMATED: CPT | Performed by: INTERNAL MEDICINE

## 2020-08-19 PROCEDURE — 99232 SBSQ HOSP IP/OBS MODERATE 35: CPT | Performed by: INTERNAL MEDICINE

## 2020-08-19 PROCEDURE — 93306 TTE W/DOPPLER COMPLETE: CPT

## 2020-08-19 PROCEDURE — 80053 COMPREHEN METABOLIC PANEL: CPT | Performed by: INTERNAL MEDICINE

## 2020-08-19 PROCEDURE — 93306 TTE W/DOPPLER COMPLETE: CPT | Performed by: INTERNAL MEDICINE

## 2020-08-19 PROCEDURE — 76700 US EXAM ABDOM COMPLETE: CPT

## 2020-08-19 RX ORDER — FUROSEMIDE 40 MG/1
40 TABLET ORAL DAILY
Status: DISCONTINUED | OUTPATIENT
Start: 2020-08-19 | End: 2020-08-20 | Stop reason: HOSPADM

## 2020-08-19 RX ADMIN — FUROSEMIDE 40 MG: 40 TABLET ORAL at 10:56

## 2020-08-19 RX ADMIN — LORAZEPAM 1 MG: 1 TABLET ORAL at 22:27

## 2020-08-19 RX ADMIN — SPIRONOLACTONE 50 MG: 25 TABLET ORAL at 09:04

## 2020-08-19 RX ADMIN — PROPRANOLOL HYDROCHLORIDE 20 MG: 20 TABLET ORAL at 09:01

## 2020-08-19 RX ADMIN — PANTOPRAZOLE SODIUM 40 MG: 40 TABLET, DELAYED RELEASE ORAL at 09:01

## 2020-08-19 RX ADMIN — FLUCONAZOLE 400 MG: 2 INJECTION, SOLUTION INTRAVENOUS at 16:44

## 2020-08-19 RX ADMIN — LACTULOSE 10 G: 10 SOLUTION ORAL at 21:35

## 2020-08-19 RX ADMIN — OXYCODONE HYDROCHLORIDE 10 MG: 5 TABLET ORAL at 09:04

## 2020-08-19 RX ADMIN — OXYCODONE HYDROCHLORIDE 10 MG: 5 TABLET ORAL at 16:44

## 2020-08-19 RX ADMIN — OXYCODONE HYDROCHLORIDE 10 MG: 5 TABLET ORAL at 21:34

## 2020-08-19 NOTE — PROGRESS NOTES
Progress Note Cj Bloom 1960, 62 y o  male MRN: [id number]    Unit/Bed#: -01 Encounter: [id number]    Primary Care Provider: Krish Erickson   Date and time admitted to hospital: 8/18/2020  3:44 PM        * Alcoholic cirrhosis of liver with ascites ([facility] [address] )  Assessment & Plan  · Alcoholic cirrhosis of liver with ascites  Recently hospitalized here for fungemia but left against medical advice  · Came in with increasing abdominal distension  Status post paracentesis  · Continue propranolol and spironolactone for history of portal hypertension  · GI following  Fungemia  Assessment & Plan  · Recent admission for fungemia  Source unclear  · 8/11/2020 ED visit  Fever but requested to be discharged home  · 8/12/2020 admission  Admitted to ICU service for septic shock requiring vasopressors and was placed on ceftriaxone/metronidazole  Left AMA  · 8/15/2020 admission  Was called back for fungemia  Was on IV fluconazole but left against medical advice as echocardiogram was recommended as well as monitoring of liver function  Prescription for fluconazole 400 mg 14 day supply was sent to his pharmacy  · ID following  Echocardiogram done  Results pending  Esophageal dysphagia  Assessment & Plan  · Esophageal dysphagia  Patient reports having history of esophageal dilatation however not found in recent reports  · GI following  · Patient refusing barium swallow  VTE Pharmacologic Prophylaxis:   Pharmacologic: Pharmacologic VTE Prophylaxis contraindicated due to thrombocytopenia  Mechanical VTE Prophylaxis in Place: Yes    Patient Centered Rounds: I have performed bedside rounds with nursing staff today  Discussions with Specialists or Other Care Team Provider: GI, ID    Education and Discussions with Family / Patient: pt    Time Spent for Care: 30 minutes  More than 50% of total time spent on counseling and coordination of care as described above      Current Length of Stay: 1 day(s)    Current Patient Status: Inpatient   Certification Statement: The patient will continue to require additional inpatient hospital stay due to above    Discharge Plan:     Code Status: Level 1 - Full Code      Subjective:   Pt seen and examined by me this morning  Pt denied any complaints  Feeling much better after paracentesis  Abdomen not distended anymore  Objective:     Vitals:   Temp (24hrs), Av 3 °F (36 8 °C), Min:98 1 °F (36 7 °C), Max:98 4 °F (36 9 °C)    Temp:  [98 1 °F (36 7 °C)-98 4 °F (36 9 °C)] 98 1 °F (36 7 °C)  HR:  [68-71] 68  Resp:  [18-20] 20  BP: (103-147)/(53-70) 130/70  SpO2:  [100 %] 100 %  Body mass index is 19 25 kg/m²  Input and Output Summary (last 24 hours): Intake/Output Summary (Last 24 hours) at 2020 1510  Last data filed at 2020 0800  Gross per 24 hour   Intake 240 ml   Output    Net 240 ml       Physical Exam:     Physical Exam    Constitutional: Pt appears comfortable  Not in any acute distress  HENT:   Head: Normocephalic and atraumatic  Eyes: EOM are normal    Neck: Neck supple  Cardiovascular: Normal rate, regular rhythm, normal heart sounds  No murmur heard  Pulmonary/Chest: Effort normal, air entry b/l equal  No respiratory distress  Pt has no wheezes or crackles  Abdominal: Soft  Non-distended, Non-tender  Bowel sounds are normal    Musculoskeletal: Normal range of motion  Neurological: awake, alert  Moving all extremities spontaneously  Psychiatric: normal mood and affect        Additional Data:     Labs:    Results from last 7 days   Lab Units 20  0545 20  1322  20  0614   WBC Thousand/uL 1 35* 2 17*   < > 4 40   HEMOGLOBIN g/dL 7 2* 8 0*   < > 7 7*   HEMATOCRIT % 23 7* 26 8*   < > 24 6*   PLATELETS Thousands/uL 40* 50*   < > 34*   BANDS PCT %  --   --   --  4   NEUTROS PCT %  --  61   < >  --    LYMPHS PCT %  --  25   < >  --    LYMPHO PCT   --   --    < > 10*   MONOS PCT %  --  9   < >  --    MONO PCT   -- --    < > 0*   EOS PCT %  --  3   < > 0    < > = values in this interval not displayed  Results from last 7 days   Lab Units 08/19/20  0545   SODIUM mmol/L 140   POTASSIUM mmol/L 3 5   CHLORIDE mmol/L 109*   CO2 mmol/L 25   BUN mg/dL 3*   CREATININE mg/dL 0 68   ANION GAP mmol/L 6   CALCIUM mg/dL 7 2*   ALBUMIN g/dL 2 4*   TOTAL BILIRUBIN mg/dL 0 50   ALK PHOS U/L 115   ALT U/L 14   AST U/L 16   GLUCOSE RANDOM mg/dL 85     Results from last 7 days   Lab Units 08/18/20  1322   INR  1 34*             Results from last 7 days   Lab Units 08/16/20  1239 08/15/20  2012 08/14/20  0614 08/13/20  0524 08/13/20  0518 08/12/20  2243 08/12/20  1927 08/12/20  1618   LACTIC ACID mmol/L  --  1 0  --  3 9*  --  3 5* 4 6*  --    PROCALCITONIN ng/ml 20 10* 28 04* 95 91*  --  160 40*  --   --  82 74*           * I Have Reviewed All Lab Data Listed Above  * Additional Pertinent Lab Tests Reviewed: [address] Admission Reviewed    Imaging:    Imaging Reports Reviewed Today Include:   Imaging Personally Reviewed by Myself Includes:    Recent Cultures (last 7 days):     Results from last 7 days   Lab Units 08/16/20  0615 08/15/20  2012 08/12/20  1618 08/12/20  1617   BLOOD CULTURE  No Growth at 72 hrs  No Growth at 72 hrs  No Growth at 72 hrs  No Growth at 72 hrs   Candida albicans* Candida albicans*   GRAM STAIN RESULT   --   --  Budding yeast* Yeast*       Last 24 Hours Medication List:   Current Facility-Administered Medications   Medication Dose Route Frequency Provider Last Rate    albuterol  2 puff Inhalation Q4H PRN Sydnee Lobato DO      fluconazole  400 mg Intravenous Daily Mega Perrin DO      furosemide  40 mg Oral Daily [facility]      lactulose  20 g Oral TID Sydnee Lobato DO      LORazepam  1 mg Oral HS Sydnee Lobato DO      nicotine  1 patch Transdermal Daily Mega Perrin DO      ondansetron  4 mg Intravenous Q4H PRN Sydnee Lobato DO      oxyCODONE  10 mg Oral TID Earl Herrera DO      pantoprazole  40 mg Oral Daily Mega Perrin,       propranolol  20 mg Oral Daily Mega Perrin, DO      spironolactone  50 mg Oral Daily Earl Herrera DO          Today, Patient Was Seen By: Paula Dean MD    ** Please Note: Dictation voice to text software may have been used in the creation of this document   **

## 2020-08-19 NOTE — PLAN OF CARE
Problem: Potential for Falls  Goal: Patient will remain free of falls  Description: INTERVENTIONS:  - Assess patient frequently for physical needs  -  Identify cognitive and physical deficits and behaviors that affect risk of falls  -  [place] fall precautions as indicated by assessment   - Educate patient/family on patient safety including physical limitations  - Instruct patient to call for assistance with activity based on assessment  - Modify environment to reduce risk of injury  - Consider OT/PT consult to assist with strengthening/mobility  Outcome: Progressing     Problem: Nutrition/Hydration-ADULT  Goal: Nutrient/Hydration intake appropriate for improving, restoring or maintaining nutritional needs  Description: Monitor and assess patient's nutrition/hydration status for malnutrition  Collaborate with interdisciplinary team and initiate plan and interventions as ordered  Monitor patient's weight and dietary intake as ordered or per policy  Utilize nutrition screening tool and intervene as necessary  Determine patient's food preferences and provide high-protein, high-caloric foods as appropriate       INTERVENTIONS:  - Monitor oral intake, urinary output, labs, and treatment plans  - Assess nutrition and hydration status and recommend course of action  - Evaluate amount of meals eaten  - Assist patient with eating if necessary   - Allow adequate time for meals  - Recommend/ encourage appropriate diets, oral nutritional supplements, and vitamin/mineral supplements  - Order, calculate, and assess calorie counts as needed  - Recommend, monitor, and adjust tube feedings and TPN/PPN based on assessed needs  - Assess need for intravenous fluids  - Provide specific nutrition/hydration education as appropriate  - Include patient/family/caregiver in decisions related to nutrition  Outcome: Progressing     Problem: PAIN - ADULT  Goal: Verbalizes/displays adequate comfort level or baseline comfort level  Description: Interventions:  - Encourage patient to monitor pain and request assistance  - Assess pain using appropriate pain scale  - Administer analgesics based on type and severity of pain and evaluate response  - Implement non-pharmacological measures as appropriate and evaluate response  - Consider cultural and social influences on pain and pain management  - Notify physician/advanced practitioner if interventions unsuccessful or patient reports new pain  Outcome: Progressing     Problem: INFECTION - ADULT  Goal: Absence or prevention of progression during hospitalization  Description: INTERVENTIONS:  - Assess and monitor for signs and symptoms of infection  - Monitor lab/diagnostic results  - Monitor all insertion sites, i e  indwelling lines, tubes, and drains  - Monitor endotracheal if appropriate and nasal secretions for changes in amount and color  - [place] appropriate cooling/warming therapies per order  - Administer medications as ordered  - Instruct and encourage patient and family to use good hand hygiene technique  - Identify and instruct in appropriate isolation precautions for identified infection/condition  Outcome: Progressing  Goal: Absence of fever/infection during neutropenic period  Description: INTERVENTIONS:  - Monitor WBC    Outcome: Progressing     Problem: SAFETY ADULT  Goal: Patient will remain free of falls  Description: INTERVENTIONS:  - Assess patient frequently for physical needs  -  Identify cognitive and physical deficits and behaviors that affect risk of falls    -  [place] fall precautions as indicated by assessment   - Educate patient/family on patient safety including physical limitations  - Instruct patient to call for assistance with activity based on assessment  - Modify environment to reduce risk of injury  - Consider OT/PT consult to assist with strengthening/mobility  Outcome: Progressing  Goal: Maintain or return to baseline ADL function  Description: INTERVENTIONS:  -  Assess patient's ability to carry out ADLs; assess patient's baseline for ADL function and identify physical deficits which impact ability to perform ADLs (bathing, care of mouth/teeth, toileting, grooming, dressing, etc )  - Assess/evaluate cause of self-care deficits   - Assess range of motion  - Assess patient's mobility; develop plan if impaired  - Assess patient's need for assistive devices and provide as appropriate  - Encourage maximum independence but intervene and supervise when necessary  - Involve family in performance of ADLs  - Assess for home care needs following discharge   - Consider OT consult to assist with ADL evaluation and planning for discharge  - Provide patient education as appropriate  Outcome: Progressing  Goal: Maintain or return mobility status to optimal level  Description: INTERVENTIONS:  - Assess patient's baseline mobility status (ambulation, transfers, stairs, etc )    - Identify cognitive and physical deficits and behaviors that affect mobility  - Identify mobility aids required to assist with transfers and/or ambulation (gait belt, sit-to-stand, lift, walker, cane, etc )  - [place] fall precautions as indicated by assessment  - Record patient progress and toleration of activity level on Mobility SBAR; progress patient to next Phase/Stage  - Instruct patient to call for assistance with activity based on assessment  - Consider rehabilitation consult to assist with strengthening/weightbearing, etc   Outcome: Progressing     Problem: DISCHARGE PLANNING  Goal: Discharge to home or other facility with appropriate resources  Description: INTERVENTIONS:  - Identify barriers to discharge w/patient and caregiver  - Arrange for needed discharge resources and transportation as appropriate  - Identify discharge learning needs (meds, wound care, etc )  - Arrange for interpretive services to assist at discharge as needed  - Refer to Case Management Department for coordinating discharge planning if the patient needs post-hospital services based on physician/advanced practitioner order or complex needs related to functional status, cognitive ability, or social support system  Outcome: Progressing     Problem: Knowledge Deficit  Goal: Patient/family/caregiver demonstrates understanding of disease process, treatment plan, medications, and discharge instructions  Description: Complete learning assessment and assess knowledge base    Interventions:  - Provide teaching at level of understanding  - Provide teaching via preferred learning methods  Outcome: Progressing     Problem: GASTROINTESTINAL - ADULT  Goal: Minimal or absence of nausea and/or vomiting  Description: INTERVENTIONS:  - Administer IV fluids if ordered to ensure adequate hydration  - Maintain NPO status until nausea and vomiting are resolved  - Nasogastric tube if ordered  - Administer ordered antiemetic medications as needed  - Provide nonpharmacologic comfort measures as appropriate  - Advance diet as tolerated, if ordered  - Consider nutrition services referral to assist patient with adequate nutrition and appropriate food choices  Outcome: Progressing  Goal: Maintains or returns to baseline bowel function  Description: INTERVENTIONS:  - Assess bowel function  - Encourage oral fluids to ensure adequate hydration  - Administer IV fluids if ordered to ensure adequate hydration  - Administer ordered medications as needed  - Encourage mobilization and activity  - Consider nutritional services referral to assist patient with adequate nutrition and appropriate food choices  Outcome: Progressing  Goal: Maintains adequate nutritional intake  Description: INTERVENTIONS:  - Monitor percentage of each meal consumed  - Identify factors contributing to decreased intake, treat as appropriate  - Assist with meals as needed  - Monitor I&O, weight, and lab values if indicated  - Obtain nutrition services referral as needed  Outcome: Progressing     Problem: METABOLIC, FLUID AND ELECTROLYTES - ADULT  Goal: Electrolytes maintained within normal limits  Description: INTERVENTIONS:  - Monitor labs and assess patient for signs and symptoms of electrolyte imbalances  - Administer electrolyte replacement as ordered  - Monitor response to electrolyte replacements, including repeat lab results as appropriate  - Instruct patient on fluid and nutrition as appropriate  Outcome: Progressing  Goal: Fluid balance maintained  Description: INTERVENTIONS:  - Monitor labs   - Monitor I/O and WT  - Instruct patient on fluid and nutrition as appropriate  - Assess for signs & symptoms of volume excess or deficit  Outcome: Progressing     Problem: SKIN/TISSUE INTEGRITY - ADULT  Goal: Skin integrity remains intact  Description: INTERVENTIONS  - Identify patients at risk for skin breakdown  - Assess and monitor skin integrity  - Assess and monitor nutrition and hydration status  - Monitor labs (i e  albumin)  - Assess for incontinence   - Turn and reposition patient  - Assist with mobility/ambulation  - Relieve pressure over bony prominences  - Avoid friction and shearing  - Provide appropriate hygiene as needed including keeping skin clean and dry  - Evaluate need for skin moisturizer/barrier cream  - Collaborate with interdisciplinary team (i e  Nutrition, Rehabilitation, etc )   - Patient/family teaching  Outcome: Progressing  Goal: Incision(s), wounds(s) or drain site(s) healing without S/S of infection  Description: INTERVENTIONS  - Assess and document risk factors for skin impairment   - Assess and document dressing, incision, wound bed, drain sites and surrounding tissue  - Consider nutrition services referral as needed  - Oral mucous membranes remain intact  - Provide patient/ family education  Outcome: Progressing  Goal: Oral mucous membranes remain intact  Description: INTERVENTIONS  - Assess oral mucosa and hygiene practices  - Implement preventative oral hygiene regimen  - Implement oral medicated treatments as ordered  - Initiate Nutrition services referral as needed  Outcome: Progressing     Problem: HEMATOLOGIC - ADULT  Goal: Maintains hematologic stability  Description: INTERVENTIONS  - Assess for signs and symptoms of bleeding or hemorrhage  - Monitor labs  - Administer supportive blood products/factors as ordered and appropriate  Outcome: Progressing

## 2020-08-19 NOTE — ASSESSMENT & PLAN NOTE
· Esophageal dysphagia  Patient reports having history of esophageal dilatation however not found in recent reports  · GI following  · Patient refusing barium swallow

## 2020-08-19 NOTE — PROGRESS NOTES
08/19/20 1101   Spiritual Beliefs/Perceptions   Support Systems Spouse/significant other;Family members   Plan of [address] Initiated Patient/family refused  involvement   Assessment Completed by: Unit visit

## 2020-08-19 NOTE — CONSULTS
Consultation - GI   Jos Mata 62 y o  male MRN: [id number]  Unit/Bed#: -01 Encounter: [id number]    Inpatient consult to gastroenterology  Consult performed by: ROXANNE Waller  Consult ordered by: January silverman DO        ASSESSMENT and PLAN    1  Alcoholic cirrhosis   History of alcoholic cirrhosis  Decompensated with ascites, pancytopenia  LFTs within normal limits  WBC 1 35, hemoglobin 7 2, platelet count 40, INR 1 34, ammonia level normal   No evidence of hepatic encephalopathy on exam today  - continue Spironolactone daily  - continue Lasix 40 mg daily   - continue to trend labs   - check US abdomen with dopplers to evaluate for ascites and protal vein thrombosis   - continue Lactulose TID       2  Esophageal dysphagia   3  GERD   History of GERD  Continues with worsening solid food dysphagia that occurs multiple times per day  Denies any issues with liquids or swallowing pills  Unable to have EGD with dilation due to varices  - continue Protonix 40 mg daily   - will check barium swallow today     4  Esophageal varices   Last EGD performed on 08/05/20 showed small varices with no bleeding in the esophagus  Attempted to band, patient had diffuse scarring and was unable to suction for fact of banding  Also consistent with portal hypertensive gastropathy  - recommend repeat EGD in 1 year  - continue Propranolol    5  Colon cancer screening   Last colonoscopy performed at [facility] on 11/23/2019 showed large internal and external hemorrhoids, rectal varices without bleeding and tubular adenomatous polyp  Five year recall recommended November 2024  Chief Complaint   Patient presents with   [facility]     patient presents to the ED withj c/o continous bloating   left from ED earlier today AMA and returns now to be admitted      Physician Requesting Consult: Abhi Beth MD    HPI Jos Mata is a 62y o  year old male who presents with increasing abdominal distension and ongoing episodes of solid food dysphagia  Patient was admitted on 08/12 for fever and sepsis shock but left AMA, he returned on 08/15 as he was called back for fungemia  He was placed on IV fluconazole at that time but again left against medical advice  He continues with worsening solid food dysphagia, he reports multiple episodes that occur throughout the day  He denies any issues with liquids or swallowing pills  He does report increasing abdominal distension over the past few days as well  He denies any fever, chills, heartburn, reflux, nausea, vomiting, lower abdominal pain, hematochezia, melena  His appetite is stable  He does report approximately 40 lb weight loss since January 2020  Historical Information   Past Medical History:   Diagnosis Date    Ascites     Cardiac disease     Chronic left-sided headaches     Chronic pain     back and neck    Chronic pain disorder     COPD (chronic obstructive pulmonary disease) (HCC)     Esophageal varices in cirrhosis (HCC)     Fungemia     History of transfusion     Hypertension     Hypokalemia 1/3/2020     Past Surgical History:   Procedure Laterality Date    BACK SURGERY      CERVICAL FUSION      CHOLECYSTECTOMY      COLONOSCOPY  11/23/2019    Internal external hemorrhoids, nonbleeding rectal varices    CORONARY ANGIOPLASTY WITH STENT PLACEMENT      2006-PTCA/STENT to mid and distal RCA; then 2009 Left-LAD normal, circumflex-normal,Proximal % stnosis, Chronic total occlusion    EGD  01/11/2006    GALLBLADDER SURGERY      HERNIA REPAIR      IR PARACENTESIS  7/15/2020    IR PARACENTESIS  8/13/2020    JOINT REPLACEMENT Left     knee    KNEE SURGERY      NECK SURGERY      PARACENTESIS      [facility]    UPPER GASTROINTESTINAL ENDOSCOPY  01/11/2006    Gastritis and duodenitis    Pathology negative for any abnormality    UPPER GASTROINTESTINAL ENDOSCOPY  11/23/2019    Grade 3 esophageal varices and portal hypertensive gastropathy     Social History   Social History     Substance and Sexual Activity   Alcohol Use Not Currently     Social History     Substance and Sexual Activity   Drug Use No     Social History     Tobacco Use   Smoking Status Current Every Day Smoker    Packs/day: 3 50    Years: 45 00    Pack years: 157 50    Types: Cigarettes   Smokeless Tobacco Never Used   Tobacco Comment    encouraged smoking cessation     Family History   Problem Relation Age of Onset    Heart disease Father     Heart disease Brother     Cardiomyopathy Brother     Colon polyps Neg Hx     Colon cancer Neg Hx        Meds/Allergies   Current Facility-Administered Medications   Medication Dose Route Frequency    albuterol (PROVENTIL HFA,VENTOLIN HFA) inhaler 2 puff  2 puff Inhalation Q4H PRN    fluconazole (DIFLUCAN) IVPB (premix) 400 mg 200 mL  400 mg Intravenous Daily    lactulose 20 g/30 mL oral solution 20 g  20 g Oral TID    LORazepam (ATIVAN) tablet 1 mg  1 mg Oral HS    nicotine (NICODERM CQ) 7 mg/24hr TD 24 hr patch 1 patch  1 patch Transdermal Daily    ondansetron (ZOFRAN) injection 4 mg  4 mg Intravenous Q4H PRN    oxyCODONE (ROXICODONE) IR tablet 10 mg  10 mg Oral TID    pantoprazole (PROTONIX) EC tablet 40 mg  40 mg Oral Daily    propranolol (INDERAL) tablet 20 mg  20 mg Oral Daily    spironolactone (ALDACTONE) tablet 50 mg  50 mg Oral Daily     Medications Prior to Admission   Medication    lactulose 20 g/30 mL    oxyCODONE HCl (ROXICODONE PO)    albuterol (2 5 mg/3 mL) 0 083 % nebulizer solution    albuterol (PROVENTIL HFA,VENTOLIN HFA) 90 mcg/act inhaler    fluconazole (DIFLUCAN) 200 mg tablet    pantoprazole (PROTONIX) 40 mg tablet    propranolol (INDERAL) 20 mg tablet    spironolactone (ALDACTONE) 50 mg tablet       Allergies   Allergen Reactions    Flexeril [Cyclobenzaprine] Hallucinations           Morphine And Related Hives    Naprosyn [Naproxen] GI Intolerance    Ultram [Tramadol] GI Intolerance       PHYSICALEXAM  Blood pressure 130/70, pulse 71, temperature 98 1 °F (36 7 °C), resp  rate 20, height 6' (1 829 m), weight 64 4 kg (141 lb 14 4 oz), SpO2 100 %  Body mass index is 19 25 kg/m²  General Appearance: NAD, cooperative, alert  Eyes: Anicteric, PERRLA, EOMI  ENT:  Normocephalic, atraumatic, normal mucosa  Neck:  Supple, symmetrical, trachea midline  Resp:  Clear to auscultation bilaterally; no rales, rhonchi or wheezing; respirations unlabored   CV:  S1 S2, Regular rate and rhythm; no murmur, rub, or gallop  GI:  Soft, non-tender, non-distended; normal bowel sounds; no masses, no organomegaly   Rectal: Deferred  Musculoskeletal: No cyanosis, clubbing or edema  Normal ROM  Skin:  No jaundice, rashes, or lesions   Heme/Lymph: No palpable cervical lymphadenopathy  Psych: Normal affect, good eye contact  Neuro: No gross deficits, AAOx3, no asterixis     Lab Results   Component Value Date    GLUCOSE 135 08/27/2015    CALCIUM 7 2 (L) 08/19/2020     08/27/2015    K 3 5 08/19/2020    CO2 25 08/19/2020     (H) 08/19/2020    BUN 3 (L) 08/19/2020    CREATININE 0 68 08/19/2020     Lab Results   Component Value Date    WBC 1 35 (LL) 08/19/2020    HGB 7 2 (L) 08/19/2020    HCT 23 7 (L) 08/19/2020    MCV 75 (L) 08/19/2020    PLT 40 (LL) 08/19/2020     Lab Results   Component Value Date    ALT 14 08/19/2020    AST 16 08/19/2020    ALKPHOS 115 08/19/2020    BILITOT 0 82 08/27/2015     No results found for: AMYLASE  Lab Results   Component Value Date    LIPASE 133 01/03/2020     No results found for: IRON, TIBC, FERRITIN  Lab Results   Component Value Date    INR 1 34 (H) 08/18/2020       Imaging Studies: I have personally reviewed pertinent reports  EKG, Pathology, and Other Studies: I have personally reviewed pertinent reports  REVIEW OF SYSTEMS:    CONSTITUTIONAL: Denies any fever, chills, rigors, and weight loss  HEENT: No earache or tinnitus   Denies hearing loss or visual disturbances  CARDIOVASCULAR: No chest pain or palpitations  RESPIRATORY: Denies any cough, hemoptysis, shortness of breath or dyspnea on exertion  GASTROINTESTINAL: As noted in the History of Present Illness  GENITOURINARY: No problems with urination  Denies any hematuria or dysuria  NEUROLOGIC: No dizziness or vertigo, denies headaches  MUSCULOSKELETAL: Denies any muscle or joint pain  SKIN: Denies skin rashes or itching  ENDOCRINE: Denies excessive thirst  Denies intolerance to heat or cold  PSYCHOSOCIAL: Denies depression or anxiety  Denies any recent memory loss

## 2020-08-19 NOTE — SOCIAL WORK
LOS: 1, URR: 44, pt is a 30 day re-admit  CM met with pt at bedside and explained role  Pt is a 30 day re-admit  Patient was initially admitted to the hospital on 8/12 for septic shock, signed out AMA and returned on 8/15 due to fungemia  Pt left this facility again AMA on 8/17  Pt returned 8/18 and was admitted again  Pt reports he was not out of the hospital long enough to follow up as advised  Patient reports he resides with his wife and children in a 2 story home with 0 PRASHANT  He is independent of ADLs', drives and uses no assistive device  He has crutches and a nebulizer at home  He denies history of STR and HHC in the past  He was at [place] [address] in 2019 for drug rehab  He does not have a POA/AD and was not interested in receiving information  He uses [facility] in [place] and reports no barriers in obtaining his medications  Pt does not wish to seek inpatient or outpatient D& treatment at this time  Pt reports his spouse will transport him home  CM to follow  CM reviewed d/c planning process including the following: identifying help at home, patient preference for d/c planning needs, availability of treatment team to discuss questions or concerns patient and/or family may have regarding understanding medications and recognizing signs and symptoms once discharged  CM also encouraged patient to follow up with all recommended appointments after discharge  Patient advised of importance for patient and family to participate in managing patients medical well being

## 2020-08-19 NOTE — UTILIZATION REVIEW
Notification of Inpatient Admission/Inpatient Authorization Request   This is a Notification of Inpatient Admission for [address]  Be advised that this patient was admitted to our facility under Inpatient Status  Contact Starla Arthur at [phone] for additional admission information  [address] [facility] DEPT  DEDICATED [phone]  Patient Name:   Gloria Bhatti   YOB: 1960       [address]   [address]:   [address]  Tax ID: [id number]  NPI: [id number] Attending Provider/NPI:  Phone:  Address: Barbara Harmon Md [[id number]]  [phone]  SAME AS FACILITY   Place of Service Code: 24 Place of Service Name:  [address] [place] [address]   Start Date: 8/18/20 1613 Discharge Date & Time: No discharge date for patient encounter  Type of Admission: Inpatient Status Discharge Disposition (if discharged): Home/Self Care   Patient Diagnoses: Fungemia [B49]  Abdominal bloating [R14 0]  Bloated abdomen [R14 0]  Ascites [R18 8]  Dysphagia [R13 10]     Orders: Admission Orders (From admission, onward)     Ordered        08/18/20 1613  Inpatient Admission (expected length of stay for this patient Order details is greater than two midnights)  Once                    Assigned Utilization Review Contact: Ceci Arthur  Utilization   Network Utilization Review Department  Phone: [phone]; Fax [phone]  Email: [email or web address]   ATTENTION PAYERS: Please call the assigned Utilization  directly with any questions or concerns ALL voicemails in the department are confidential  Send all requests for admission clinical reviews, approved or denied determinations and any other requests to dedicated fax number belonging to the campus where the patient is receiving treatment

## 2020-08-19 NOTE — UTILIZATION REVIEW
Initial Clinical Review    Admission: Date/Time/Statement:   Admission Orders (From admission, onward)     Ordered        08/18/20 1613  Inpatient Admission (expected length of stay for this patient Order details is greater than two midnights)  Once                   Orders Placed This Encounter   Procedures    Inpatient Admission (expected length of stay for this patient Order details is greater than two midnights)     Standing Status:   Standing     Number of Occurrences:   1     Order Specific Question:   Admitting Physician     Answer:   Gianni Cunningham [[id number]]     Order Specific Question:   Level of Care     Answer:   Med Surg [[id number]]     Order Specific Question:   Estimated length of stay     Answer:   More than 2 Midnights     Order Specific Question:   Certification     Answer:   I certify that inpatient services are medically necessary for this patient for a duration of greater than two midnights  See H&P and MD Progress Notes for additional information about the patient's course of treatment  ED Arrival Information     Expected Arrival Acuity Means of Arrival Escorted By Service Admission Type    - 8/18/2020 15:36 Urgent Walk-In Self General Medicine Urgent    Arrival Complaint    Abnormal Blood Work        Chief Complaint   Patient presents with   [facility]     patient presents to the ED withj c/o continous bloating  left from ED earlier today AMA and returns now to be admitted      Assessment/Plan:  this is a a 62year old male from home to ED admitted inpatient due to alcoholic cirrhosis of liver with ascites/fungemia  Seen in ED earlier in day and left AMA  Patient was initially admitted to the hospital on 8/12 for septic shock, signed out AMA and returned on 8/15 due to fungemia  He was started on IV fluconazole with blood cultures still pending, but signed out [place] 8/17  Re presented due to worsening ascites  On exam decreased breath sounds  Jaundiced  This am WBC of 2 17, Hg 8, Platelets 50  On CMP Ca only 7 8  Troponin was normal   Final result of repeat blood cultures still pending  IV fluconazole restarted  IR consulted to evaluate for paracentesis, continue propranolol and spironolactone  ECHO ordered and ID consulted  Fluconazole in progress  GI consulted for esophageal dysphagia  On 8/19/2020 per GI- Patient with decompensated alcoholic cirrhosis with ascites  To continue spironolactone, lasix, US abdomen ordered, continue lactulose  On 8/19/2020 per ID- Patient with candidemia in cirrhotic setting  To continue Fluconazole  Recommend echo, not sure he will stay for it        On 8/19 per IR- will attempt paracentesis after [address]    ED Triage Vitals [08/18/20 1548]   Temperature Pulse Respirations Blood Pressure SpO2   98 4 °F (36 9 °C) 71 20 147/65 100 %      Temp Source Heart Rate Source Patient Position - Orthostatic VS BP Location FiO2 (%)   Temporal Monitor Sitting Right arm --      Pain Score       7          Wt Readings from Last 1 Encounters:   08/19/20 64 4 kg (141 lb 14 4 oz)     Additional Vital Signs:   08/19/20 08:51:08   98 1 °F (36 7 °C)      20   130/70   90             08/18/20 23:52:37         18   103/53   70             Pertinent Labs/Diagnostic Test Results:   8/18/2020 EKG Sinus bradycardia  Possible Left atrial enlargement  Prolonged QT  Abnormal ECG  When compared with ECG of 12-AUG-2020 16:13,  Premature ventricular complexes are no longer Present  Results from last 7 days   Lab Units 08/12/20  1619   SARS-COV-2  Negative     Results from last 7 days   Lab Units 08/19/20  0545 08/18/20  1322 08/17/20  0549 08/16/20  0615 08/15/20  2012 08/14/20  0614  08/12/20  1617   WBC Thousand/uL 1 35* 2 17* 1 57* 1 84* 5 35 4 40   < > 2 26*   HEMOGLOBIN g/dL 7 2* 8 0* 7 5* 7 3* 8 6* 7 7*   < > 6 7*   HEMATOCRIT % 23 7* 26 8* 24 1* 23 9* 28 1* 24 6*   < > 22 2*   PLATELETS Thousands/uL 40* 50* 38* 42* 56* 34*   < > 41*   NEUTROS ABS Thousands/µL  --  1 35* 0 96*  -- 3 99  --   --   --    BANDS PCT %  --   --   --   --   --  4  --  12*    < > = values in this interval not displayed  Results from last 7 days   Lab Units 08/19/20  0545 08/18/20  1322 08/17/20  0549 08/16/20  0615 08/15/20  2012 08/14/20  0614 08/13/20  0518   SODIUM mmol/L 140 139 140 139 138 138 137   POTASSIUM mmol/L 3 5 3 9 3 8 3 6 4 1 3 4* 4 0   CHLORIDE mmol/L 109* 104 110* 110* 106 105 103   CO2 mmol/L 25 28 25 24 25 25 19*   ANION GAP mmol/L 6 7 5 5 7 8 15*   BUN mg/dL 3* 5 5 6 9 14 14   CREATININE mg/dL 0 68 0 86 0 79 0 71 0 91 0 77 1 29   EGFR ml/min/1 73sq m 105 95 98 103 92 99 60   CALCIUM mg/dL 7 2* 7 8* 7 2* 7 0* 7 6* 7 3* 6 8*   MAGNESIUM mg/dL  --   --   --  1 6  --  1 7 1 2*   PHOSPHORUS mg/dL  --   --   --   --   --  1 9* 2 9     Results from last 7 days   Lab Units 08/19/20  0545 08/18/20  1322 08/17/20  0621 08/17/20  0549 08/16/20  0615 08/15/20  2012  08/12/20  1927   AST U/L 16 18  --  17 18 27   < > 68*   ALT U/L 14 18  --  17 18 23   < > 21   ALK PHOS U/L 115 137*  --  120* 132* 143*   < > 177*   TOTAL PROTEIN g/dL 5 7* 6 6  --  5 6* 5 4* 6 4   < > 5 3*   ALBUMIN g/dL 2 4* 3 0*  --  2 4* 2 5* 3 0*   < > 2 5*   TOTAL BILIRUBIN mg/dL 0 50 0 50  --  0 60 0 40 0 60   < > 2 50*   AMMONIA umol/L  --   --  28  --   --   --   --  <10*    < > = values in this interval not displayed       Results from last 7 days   Lab Units 08/19/20  0545 08/18/20  1322 08/17/20  0549 08/16/20  0615 08/15/20  2012 08/14/20  0614 08/13/20  0518 08/12/20  1927 08/12/20  1619   GLUCOSE RANDOM mg/dL 85 92 89 85 88 105 95 88 80     Results from last 7 days   Lab Units 08/12/20  1619   CK TOTAL U/L 79     Results from last 7 days   Lab Units 08/18/20  1322 08/12/20  1617   TROPONIN I ng/mL 0 02 0 03     Results from last 7 days   Lab Units 08/18/20  1322 08/17/20  0549 08/15/20  2012  08/12/20  1618   PROTIME seconds 16 6* 16 2* 16 8*   < > 20 3*   INR  1 34* 1 32* 1 38*   < > 1 76*   PTT seconds 34  --  39*  --  37 < > = values in this interval not displayed  Results from last 7 days   Lab Units 08/16/20  1239 08/15/20  2012 08/14/20  0614 08/13/20  0518 08/12/20  1618   PROCALCITONIN ng/ml 20 10* 28 04* 95 91* 160 40* 82 74*     Results from last 7 days   Lab Units 08/15/20  2012 08/13/20  0524 08/12/20  2243 08/12/20  1927 08/12/20  1617   LACTIC ACID mmol/L 1 0 3 9* 3 5* 4 6* 5 1*     Results from last 7 days   Lab Units 08/12/20  2221   CLARITY UA  Clear   COLOR UA  Yellow   SPEC GRAV UA  <=1 005   PH UA  5 5   GLUCOSE UA mg/dl Negative   KETONES UA mg/dl Negative   BLOOD UA  Small*   PROTEIN UA mg/dl Negative   NITRITE UA  Negative   BILIRUBIN UA  Negative   UROBILINOGEN UA E U /dl 1 0   LEUKOCYTES UA  Negative   WBC UA /hpf 0-1*   RBC UA /hpf 2-4*   BACTERIA UA /hpf Occasional   EPITHELIAL CELLS WET PREP /hpf Occasional   MUCUS THREADS  Occasional*     Results from last 7 days   Lab Units 08/12/20  1927   ETHANOL LVL mg/dL <3     Results from last 7 days   Lab Units 08/16/20  0615 08/15/20  2012 08/12/20  1618 08/12/20  1617   BLOOD CULTURE  No Growth at 48 hrs  No Growth at 48 hrs  No Growth at 72 hrs  No Growth at 72 hrs   Candida albicans* Candida albicans*   GRAM STAIN RESULT   --   --  Budding yeast* Yeast*     Results from last 7 days   Lab Units 08/16/20  0615 08/14/20  0614 08/12/20  1617   TOTAL COUNTED  100 100 100     ED Treatment:   Medication Administration from 08/18/2020 1536 to 08/18/2020 1812       Date/Time Order Dose Route Action Comments     08/18/2020 1632 fluconazole (DIFLUCAN) IVPB (premix) 400 mg 200 mL 400 mg Intravenous New Bag         Past Medical History:   Diagnosis Date    Ascites     Cardiac disease     Chronic left-sided headaches     Chronic pain     back and neck    Chronic pain disorder     COPD (chronic obstructive pulmonary disease) (HCC)     Esophageal varices in cirrhosis ([address] )     Fungemia     History of transfusion     Hypertension     Hypokalemia 1/3/2020 Present on Admission:   Alcoholic cirrhosis of liver with ascites ([facility] )   Anxiety   Esophageal dysphagia   Gastroesophageal reflux disease   Fungemia   Hepatic encephalopathy (HCC)   [facility]      Admitting Diagnosis: Fungemia [B49]  Abdominal bloating [R14 0]  Bloated abdomen [R14 0]  Ascites [R18 8]  Dysphagia [R13 10]  Age/Sex: 62 y o  male  Admission Orders: 8/18/2020 1613 inpatient   Scheduled Medications:  fluconazole, 400 mg, Intravenous, Daily  furosemide, 40 mg, Oral, Daily  lactulose, 20 g, Oral, TID  LORazepam, 1 mg, Oral, HS  nicotine, 1 patch, Transdermal, Daily  oxyCODONE, 10 mg, Oral, TID  pantoprazole, 40 mg, Oral, Daily  propranolol, 20 mg, Oral, Daily  spironolactone, 50 mg, Oral, Daily    Continuous IV Infusions: none     PRN Meds: not used   albuterol, 2 puff, Inhalation, Q4H PRN  ondansetron, 4 mg, Intravenous, Q4H PRN    Clears     IR PATIENT EVAL  IP CONSULT TO GASTROENTEROLOGY  IP CONSULT TO INFECTIOUS DISEASES    Network Utilization Review Department  [email or web address]  ATTENTION: Please call with any questions or concerns to [phone] and carefully listen to the prompts so that you are directed to the right person  All voicemails are confidential   Linda Yung all requests for admission clinical reviews, approved or denied determinations and any other requests to dedicated fax number below belonging to the campus where the patient is receiving treatment   List of dedicated fax numbers for the Facilities:  [address] [place] [address] DENIALS (Administrative/Medical Necessity) [phone]   [address] (Maternity/NICU/Pediatrics) [phone]   Elizabeth Flatten [phone]   Humberto Madden [phone]   Jazmyn Noyola [phone]   [address] [facility] [phone] [address] [facility]  [phone]   [address] [place] [address] [facility] [phone]

## 2020-08-19 NOTE — ASSESSMENT & PLAN NOTE
· Alcoholic cirrhosis of liver with ascites  Recently hospitalized here for fungemia but left against medical advice  · Came in with increasing abdominal distension  Status post paracentesis  · Continue propranolol and spironolactone for history of portal hypertension  · GI following

## 2020-08-19 NOTE — CONSULTS
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, not entirely clear where he got it from, but suspect it was real as he was so sick for a few days, though got better without antifungal rx  ,His repeat cultures have all been negative  I doubt patient will stay around long, already talking about leaving, it would be nice for him to get an echo though I doubt he has endocarditis  He is going for a paracentesis this afternoon and a swallow study  - cont  Fluconazole 400 mg IV qday for now  - if leaves AMA, get him Fluconazole 400 mg po qday until 8/29  - get echo if possile      History of Present Illness   Physician Requesting Consult: Vannessa Morales MD  Reason for Consult / Principal Problem:  fungemia    HPI: Phong Simpson is a 62y o  year old male with H/O  etoh cirrhosis, states he has not drank in 20 years, sounds like he is non-compliant with the management of his cirrhosis  Patient underwent EGD on the 5th of this month, findings unclear, but left post-procedure AMA after arguing with staff  Patient had fevers and came to ER on the 11th those blood cultures were negative  He returned on the 12th because he did not feel well  He was hypotensive and was rx with sepsis rx, he received broad spectrum abx  He left AMA on the 14th  The cultures from the 12th came back + for C  Albicans  He was called back to the hospital received one dose of IV fluconazole and left AMA the next day  Repeat cultures were drawn and have been negative, not clear if this was before or after the fluconazole  For the past 2 days he states he has had dysphagia and had trouble swallowing the pill, though he crushed them and took them  Patient also developed increased abd distention this was his primary reason for returning to the ER    He has been afebrile, he had repeat blood cultures and he was restarted on IV fluconazole  He denies headaches, chest pains, abd pains, n/v/d  I discussed findings of EGD from the 5th, there was no candidiasis  He has  L TKR that is not bothering him, he has no pacer or prosthetic valves  Consults    ROS: 12 systems reviewed, remainder is neg  Historical Information   Past Medical History:   Diagnosis Date    Ascites     Cardiac disease     Chronic left-sided headaches     Chronic pain     back and neck    Chronic pain disorder     COPD (chronic obstructive pulmonary disease) (HCC)     Esophageal varices in cirrhosis (HCC)     Fungemia     History of transfusion     Hypertension     Hypokalemia 1/3/2020     Past Surgical History:   Procedure Laterality Date    BACK SURGERY      CERVICAL FUSION      CHOLECYSTECTOMY      COLONOSCOPY  11/23/2019    Internal external hemorrhoids, nonbleeding rectal varices    CORONARY ANGIOPLASTY WITH STENT PLACEMENT      2006-PTCA/STENT to mid and distal RCA; then 2009 Left-LAD normal, circumflex-normal,Proximal % stnosis, Chronic total occlusion    EGD  01/11/2006    GALLBLADDER SURGERY      HERNIA REPAIR      IR PARACENTESIS  7/15/2020    IR PARACENTESIS  8/13/2020    JOINT REPLACEMENT Left     knee    KNEE SURGERY      NECK SURGERY      PARACENTESIS      [facility]    UPPER GASTROINTESTINAL ENDOSCOPY  01/11/2006    Gastritis and duodenitis    Pathology negative for any abnormality    UPPER GASTROINTESTINAL ENDOSCOPY  11/23/2019    Grade 3 esophageal varices and portal hypertensive gastropathy     Social History   Social History     Substance and Sexual Activity   Alcohol Use Not Currently     Social History     Substance and Sexual Activity   Drug Use No     Social History     Tobacco Use   Smoking Status Current Every Day Smoker    Packs/day: 3 50    Years: 45 00    Pack years: 157 50    Types: Cigarettes   Smokeless Tobacco Never Used   Tobacco Comment    encouraged smoking cessation     Family History Problem Relation Age of Onset    Heart disease Father     Heart disease Brother     Cardiomyopathy Brother     Colon polyps Neg Hx     Colon cancer Neg Hx        Meds/Allergies   MEDS: reviewed      Current Facility-Administered Medications:     albuterol (PROVENTIL HFA,VENTOLIN HFA) inhaler 2 puff, 2 puff, Inhalation, Q4H PRN, Mega Perrin DO    fluconazole (DIFLUCAN) IVPB (premix) 400 mg 200 mL, 400 mg, Intravenous, Daily, Mega Perrin DO    furosemide (LASIX) tablet 40 mg, 40 mg, Oral, Daily, [employer], CRNP, 40 mg at 08/19/20 1056    lactulose 20 g/30 mL oral solution 20 g, 20 g, Oral, TID, Mega Perrin DO    LORazepam (ATIVAN) tablet 1 mg, 1 mg, Oral, HS, Mega Perrin, , 1 mg at 08/18/20 2103    nicotine (NICODERM CQ) 7 mg/24hr TD 24 hr patch 1 patch, 1 patch, Transdermal, Daily, Mega Perrin DO    ondansetron (ZOFRAN) injection 4 mg, 4 mg, Intravenous, Q4H PRN, Mega Perrin DO    oxyCODONE (ROXICODONE) IR tablet 10 mg, 10 mg, Oral, TID, Mega Perrin DO, 10 mg at 08/19/20 0904    pantoprazole (PROTONIX) EC tablet 40 mg, 40 mg, Oral, Daily, Mega Perrin, , 40 mg at 08/19/20 0901    propranolol (INDERAL) tablet 20 mg, 20 mg, Oral, Daily, Mega Perrin DO, 20 mg at 08/19/20 0901    spironolactone (ALDACTONE) tablet 50 mg, 50 mg, Oral, Daily, Mega Perrin, DO, 50 mg at 08/19/20 7953    Allergies   Allergen Reactions    Flexeril [Cyclobenzaprine] Hallucinations           Morphine And Related Hives    Naprosyn [Naproxen] GI Intolerance    Ultram [Tramadol] GI Intolerance         Intake/Output Summary (Last 24 hours) at 8/19/2020 1236  Last data filed at 8/19/2020 0800  Gross per 24 hour   Intake 240 ml   Output    Net 240 ml       PE:  NAD  VSS, Tmax: afebrile  HEENT: anicteric, omm  NECK: supple no adenopathy  CARDIAC: rrr s1s2  LUNGS: decreased  ABDOMEN: mildly distended minimal pain on palpation  EXTREMITIES: no edema  SKIN: no rashes  NEURO: grossly non-focal    Invasive Devices:   Peripheral IV 08/18/20 Right Antecubital (Active)   Site Assessment Clean;Dry; Intact 08/19/20 0900   Dressing Type Transparent 08/19/20 0900   Line Status Flushed 08/19/20 0900   Dressing Status Clean;Dry; Intact 08/19/20 0900   Dressing Change Due 08/22/20 08/18/20 2100           Lab Results:   Admission on 08/18/2020   Component Date Value    Sodium 08/19/2020 140     Potassium 08/19/2020 3 5     Chloride 08/19/2020 109*    CO2 08/19/2020 25     ANION GAP 08/19/2020 6     BUN 08/19/2020 3*    Creatinine 08/19/2020 0 68     Glucose 08/19/2020 85     Calcium 08/19/2020 7 2*    AST 08/19/2020 16     ALT 08/19/2020 14     Alkaline Phosphatase 08/19/2020 115     Total Protein 08/19/2020 5 7*    Albumin 08/19/2020 2 4*    Total Bilirubin 08/19/2020 0 50     eGFR 08/19/2020 105     WBC 08/19/2020 1 35*    RBC 08/19/2020 3 18*    Hemoglobin 08/19/2020 7 2*    Hematocrit 08/19/2020 23 7*    MCV 08/19/2020 75*    MCH 08/19/2020 22 6*    MCHC 08/19/2020 30 4*    RDW 08/19/2020 20 7*    Platelets 41/65/6136 40*    MPV 08/19/2020 10 8      Imaging Studies: I have personally reviewed pertinent reports  EKG, Pathology, and Other Studies: I have personally reviewed pertinent reports  Culture  Lab Results   Component Value Date    BLOODCX No Growth at 72 hrs  08/16/2020    BLOODCX No Growth at 72 hrs  08/16/2020    BLOODCX No Growth at 72 hrs  08/15/2020    BLOODCX No Growth at 72 hrs  08/15/2020    BLOODCX Candida albicans (A) 08/12/2020    BLOODCX Candida albicans (A) 08/12/2020    BLOODCX No Growth After 5 Days  08/11/2020    BLOODCX No Growth After 5 Days   08/11/2020     No results found for: WOUNDCULT  No results found for: URINECX  No results found for: SPUTUMCULTUR    Principal Problem:    Alcoholic cirrhosis of liver with ascites ([facility] [address] )  Active Problems:    Esophageal dysphagia    Hepatic encephalopathy ([address] )    Opioid dependence ([facility] )    Gastroesophageal reflux disease    Anxiety    Fungemia

## 2020-08-19 NOTE — ASSESSMENT & PLAN NOTE
· Recent admission for fungemia  Source unclear  · 8/11/2020 ED visit  Fever but requested to be discharged home  · 8/12/2020 admission  Admitted to ICU service for septic shock requiring vasopressors and was placed on ceftriaxone/metronidazole  Left AMA  · 8/15/2020 admission  Was called back for fungemia  Was on IV fluconazole but left against medical advice as echocardiogram was recommended as well as monitoring of liver function  Prescription for fluconazole 400 mg 14 day supply was sent to his pharmacy  · ID following  Echocardiogram done  Results pending

## 2020-08-19 NOTE — PLAN OF CARE
Problem: Potential for Falls  Goal: Patient will remain free of falls  Description: INTERVENTIONS:  - Assess patient frequently for physical needs  -  Identify cognitive and physical deficits and behaviors that affect risk of falls  -  [place] fall precautions as indicated by assessment   - Educate patient/family on patient safety including physical limitations  - Instruct patient to call for assistance with activity based on assessment  - Modify environment to reduce risk of injury  - Consider OT/PT consult to assist with strengthening/mobility  Outcome: Progressing     Problem: Nutrition/Hydration-ADULT  Goal: Nutrient/Hydration intake appropriate for improving, restoring or maintaining nutritional needs  Description: Monitor and assess patient's nutrition/hydration status for malnutrition  Collaborate with interdisciplinary team and initiate plan and interventions as ordered  Monitor patient's weight and dietary intake as ordered or per policy  Utilize nutrition screening tool and intervene as necessary  Determine patient's food preferences and provide high-protein, high-caloric foods as appropriate       INTERVENTIONS:  - Monitor oral intake, urinary output, labs, and treatment plans  - Assess nutrition and hydration status and recommend course of action  - Evaluate amount of meals eaten  - Assist patient with eating if necessary   - Allow adequate time for meals  - Recommend/ encourage appropriate diets, oral nutritional supplements, and vitamin/mineral supplements  - Order, calculate, and assess calorie counts as needed  - Recommend, monitor, and adjust tube feedings and TPN/PPN based on assessed needs  - Assess need for intravenous fluids  - Provide specific nutrition/hydration education as appropriate  - Include patient/family/caregiver in decisions related to nutrition  Outcome: Progressing     Problem: PAIN - ADULT  Goal: Verbalizes/displays adequate comfort level or baseline comfort level  Description: Interventions:  - Encourage patient to monitor pain and request assistance  - Assess pain using appropriate pain scale  - Administer analgesics based on type and severity of pain and evaluate response  - Implement non-pharmacological measures as appropriate and evaluate response  - Consider cultural and social influences on pain and pain management  - Notify physician/advanced practitioner if interventions unsuccessful or patient reports new pain  Outcome: Progressing     Problem: INFECTION - ADULT  Goal: Absence or prevention of progression during hospitalization  Description: INTERVENTIONS:  - Assess and monitor for signs and symptoms of infection  - Monitor lab/diagnostic results  - Monitor all insertion sites, i e  indwelling lines, tubes, and drains  - Monitor endotracheal if appropriate and nasal secretions for changes in amount and color  - [place] appropriate cooling/warming therapies per order  - Administer medications as ordered  - Instruct and encourage patient and family to use good hand hygiene technique  - Identify and instruct in appropriate isolation precautions for identified infection/condition  Outcome: Progressing  Goal: Absence of fever/infection during neutropenic period  Description: INTERVENTIONS:  - Monitor WBC    Outcome: Progressing     Problem: SAFETY ADULT  Goal: Patient will remain free of falls  Description: INTERVENTIONS:  - Assess patient frequently for physical needs  -  Identify cognitive and physical deficits and behaviors that affect risk of falls    -  [place] fall precautions as indicated by assessment   - Educate patient/family on patient safety including physical limitations  - Instruct patient to call for assistance with activity based on assessment  - Modify environment to reduce risk of injury  - Consider OT/PT consult to assist with strengthening/mobility  Outcome: Progressing  Goal: Maintain or return to baseline ADL function  Description: INTERVENTIONS:  -  Assess patient's ability to carry out ADLs; assess patient's baseline for ADL function and identify physical deficits which impact ability to perform ADLs (bathing, care of mouth/teeth, toileting, grooming, dressing, etc )  - Assess/evaluate cause of self-care deficits   - Assess range of motion  - Assess patient's mobility; develop plan if impaired  - Assess patient's need for assistive devices and provide as appropriate  - Encourage maximum independence but intervene and supervise when necessary  - Involve family in performance of ADLs  - Assess for home care needs following discharge   - Consider OT consult to assist with ADL evaluation and planning for discharge  - Provide patient education as appropriate  Outcome: Progressing  Goal: Maintain or return mobility status to optimal level  Description: INTERVENTIONS:  - Assess patient's baseline mobility status (ambulation, transfers, stairs, etc )    - Identify cognitive and physical deficits and behaviors that affect mobility  - Identify mobility aids required to assist with transfers and/or ambulation (gait belt, sit-to-stand, lift, walker, cane, etc )  - [place] fall precautions as indicated by assessment  - Record patient progress and toleration of activity level on Mobility SBAR; progress patient to next Phase/Stage  - Instruct patient to call for assistance with activity based on assessment  - Consider rehabilitation consult to assist with strengthening/weightbearing, etc   Outcome: Progressing     Problem: DISCHARGE PLANNING  Goal: Discharge to home or other facility with appropriate resources  Description: INTERVENTIONS:  - Identify barriers to discharge w/patient and caregiver  - Arrange for needed discharge resources and transportation as appropriate  - Identify discharge learning needs (meds, wound care, etc )  - Arrange for interpretive services to assist at discharge as needed  - Refer to Case Management Department for coordinating discharge planning if the patient needs post-hospital services based on physician/advanced practitioner order or complex needs related to functional status, cognitive ability, or social support system  Outcome: Progressing     Problem: Knowledge Deficit  Goal: Patient/family/caregiver demonstrates understanding of disease process, treatment plan, medications, and discharge instructions  Description: Complete learning assessment and assess knowledge base    Interventions:  - Provide teaching at level of understanding  - Provide teaching via preferred learning methods  Outcome: Progressing     Problem: GASTROINTESTINAL - ADULT  Goal: Minimal or absence of nausea and/or vomiting  Description: INTERVENTIONS:  - Administer IV fluids if ordered to ensure adequate hydration  - Maintain NPO status until nausea and vomiting are resolved  - Nasogastric tube if ordered  - Administer ordered antiemetic medications as needed  - Provide nonpharmacologic comfort measures as appropriate  - Advance diet as tolerated, if ordered  - Consider nutrition services referral to assist patient with adequate nutrition and appropriate food choices  Outcome: Progressing  Goal: Maintains or returns to baseline bowel function  Description: INTERVENTIONS:  - Assess bowel function  - Encourage oral fluids to ensure adequate hydration  - Administer IV fluids if ordered to ensure adequate hydration  - Administer ordered medications as needed  - Encourage mobilization and activity  - Consider nutritional services referral to assist patient with adequate nutrition and appropriate food choices  Outcome: Progressing  Goal: Maintains adequate nutritional intake  Description: INTERVENTIONS:  - Monitor percentage of each meal consumed  - Identify factors contributing to decreased intake, treat as appropriate  - Assist with meals as needed  - Monitor I&O, weight, and lab values if indicated  - Obtain nutrition services referral as needed  Outcome: Progressing     Problem: METABOLIC, FLUID AND ELECTROLYTES - ADULT  Goal: Electrolytes maintained within normal limits  Description: INTERVENTIONS:  - Monitor labs and assess patient for signs and symptoms of electrolyte imbalances  - Administer electrolyte replacement as ordered  - Monitor response to electrolyte replacements, including repeat lab results as appropriate  - Instruct patient on fluid and nutrition as appropriate  Outcome: Progressing  Goal: Fluid balance maintained  Description: INTERVENTIONS:  - Monitor labs   - Monitor I/O and WT  - Instruct patient on fluid and nutrition as appropriate  - Assess for signs & symptoms of volume excess or deficit  Outcome: Progressing     Problem: SKIN/TISSUE INTEGRITY - ADULT  Goal: Skin integrity remains intact  Description: INTERVENTIONS  - Identify patients at risk for skin breakdown  - Assess and monitor skin integrity  - Assess and monitor nutrition and hydration status  - Monitor labs (i e  albumin)  - Assess for incontinence   - Turn and reposition patient  - Assist with mobility/ambulation  - Relieve pressure over bony prominences  - Avoid friction and shearing  - Provide appropriate hygiene as needed including keeping skin clean and dry  - Evaluate need for skin moisturizer/barrier cream  - Collaborate with interdisciplinary team (i e  Nutrition, Rehabilitation, etc )   - Patient/family teaching  Outcome: Progressing  Goal: Incision(s), wounds(s) or drain site(s) healing without S/S of infection  Description: INTERVENTIONS  - Assess and document risk factors for skin impairment   - Assess and document dressing, incision, wound bed, drain sites and surrounding tissue  - Consider nutrition services referral as needed  - Oral mucous membranes remain intact  - Provide patient/ family education  Outcome: Progressing  Goal: Oral mucous membranes remain intact  Description: INTERVENTIONS  - Assess oral mucosa and hygiene practices  - Implement preventative oral hygiene regimen  - Implement oral medicated treatments as ordered  - Initiate Nutrition services referral as needed  Outcome: Progressing     Problem: HEMATOLOGIC - ADULT  Goal: Maintains hematologic stability  Description: INTERVENTIONS  - Assess for signs and symptoms of bleeding or hemorrhage  - Monitor labs  - Administer supportive blood products/factors as ordered and appropriate  Outcome: Progressing

## 2020-08-20 VITALS
SYSTOLIC BLOOD PRESSURE: 109 MMHG | RESPIRATION RATE: 18 BRPM | HEIGHT: 72 IN | DIASTOLIC BLOOD PRESSURE: 61 MMHG | TEMPERATURE: 98.2 F | HEART RATE: 68 BPM | WEIGHT: 141.9 LBS | OXYGEN SATURATION: 100 % | BODY MASS INDEX: 19.22 KG/M2

## 2020-08-20 LAB
ALBUMIN SERPL BCP-MCNC: 2.6 G/DL (ref 3.5–5)
ALP SERPL-CCNC: 118 U/L (ref 46–116)
ALT SERPL W P-5'-P-CCNC: 10 U/L (ref 12–78)
ANION GAP SERPL CALCULATED.3IONS-SCNC: 6 MMOL/L (ref 4–13)
AST SERPL W P-5'-P-CCNC: 13 U/L (ref 5–45)
BASOPHILS # BLD AUTO: 0.01 THOUSANDS/ΜL (ref 0–0.1)
BASOPHILS NFR BLD AUTO: 1 % (ref 0–1)
BILIRUB SERPL-MCNC: 0.5 MG/DL (ref 0.2–1)
BUN SERPL-MCNC: 3 MG/DL (ref 5–25)
CALCIUM SERPL-MCNC: 7.3 MG/DL (ref 8.3–10.1)
CHLORIDE SERPL-SCNC: 106 MMOL/L (ref 100–108)
CO2 SERPL-SCNC: 26 MMOL/L (ref 21–32)
CREAT SERPL-MCNC: 0.73 MG/DL (ref 0.6–1.3)
EOSINOPHIL # BLD AUTO: 0.04 THOUSAND/ΜL (ref 0–0.61)
EOSINOPHIL NFR BLD AUTO: 2 % (ref 0–6)
ERYTHROCYTE [DISTWIDTH] IN BLOOD BY AUTOMATED COUNT: 20.6 % (ref 11.6–15.1)
GFR SERPL CREATININE-BSD FRML MDRD: 102 ML/MIN/1.73SQ M
GLUCOSE SERPL-MCNC: 86 MG/DL (ref 65–140)
HCT VFR BLD AUTO: 24 % (ref 36.5–49.3)
HGB BLD-MCNC: 7.4 G/DL (ref 12–17)
IMM GRANULOCYTES # BLD AUTO: 0 THOUSAND/UL (ref 0–0.2)
IMM GRANULOCYTES NFR BLD AUTO: 0 % (ref 0–2)
LYMPHOCYTES # BLD AUTO: 0.54 THOUSANDS/ΜL (ref 0.6–4.47)
LYMPHOCYTES NFR BLD AUTO: 32 % (ref 14–44)
MCH RBC QN AUTO: 22.8 PG (ref 26.8–34.3)
MCHC RBC AUTO-ENTMCNC: 30.8 G/DL (ref 31.4–37.4)
MCV RBC AUTO: 74 FL (ref 82–98)
MONOCYTES # BLD AUTO: 0.14 THOUSAND/ΜL (ref 0.17–1.22)
MONOCYTES NFR BLD AUTO: 8 % (ref 4–12)
NEUTROPHILS # BLD AUTO: 0.98 THOUSANDS/ΜL (ref 1.85–7.62)
NEUTS SEG NFR BLD AUTO: 57 % (ref 43–75)
NRBC BLD AUTO-RTO: 0 /100 WBCS
PLATELET # BLD AUTO: 49 THOUSANDS/UL (ref 149–390)
POTASSIUM SERPL-SCNC: 3.4 MMOL/L (ref 3.5–5.3)
PROT SERPL-MCNC: 6 G/DL (ref 6.4–8.2)
RBC # BLD AUTO: 3.24 MILLION/UL (ref 3.88–5.62)
SODIUM SERPL-SCNC: 138 MMOL/L (ref 136–145)
WBC # BLD AUTO: 1.71 THOUSAND/UL (ref 4.31–10.16)

## 2020-08-20 PROCEDURE — 80053 COMPREHEN METABOLIC PANEL: CPT | Performed by: INTERNAL MEDICINE

## 2020-08-20 PROCEDURE — 99239 HOSP IP/OBS DSCHRG MGMT >30: CPT | Performed by: INTERNAL MEDICINE

## 2020-08-20 PROCEDURE — 85025 COMPLETE CBC W/AUTO DIFF WBC: CPT | Performed by: INTERNAL MEDICINE

## 2020-08-20 PROCEDURE — 99232 SBSQ HOSP IP/OBS MODERATE 35: CPT | Performed by: INTERNAL MEDICINE

## 2020-08-20 RX ORDER — FUROSEMIDE 40 MG/1
40 TABLET ORAL DAILY
Qty: 30 TABLET | Refills: 0 | Status: SHIPPED | OUTPATIENT
Start: 2020-08-21

## 2020-08-20 RX ORDER — FLUCONAZOLE 200 MG/1
400 TABLET ORAL DAILY
Qty: 28 TABLET | Refills: 0 | Status: SHIPPED | OUTPATIENT
Start: 2020-08-20 | End: 2020-09-03

## 2020-08-20 RX ADMIN — FLUCONAZOLE 400 MG: 2 INJECTION, SOLUTION INTRAVENOUS at 09:30

## 2020-08-20 RX ADMIN — OXYCODONE HYDROCHLORIDE 10 MG: 5 TABLET ORAL at 09:16

## 2020-08-20 RX ADMIN — PANTOPRAZOLE SODIUM 40 MG: 40 TABLET, DELAYED RELEASE ORAL at 09:19

## 2020-08-20 RX ADMIN — OXYCODONE HYDROCHLORIDE 10 MG: 5 TABLET ORAL at 14:01

## 2020-08-20 NOTE — ASSESSMENT & PLAN NOTE
· Alcoholic cirrhosis of liver with ascites  Recently hospitalized here for fungemia but left against medical advice  · Came in with increasing abdominal distension  Ultrasound with minimal ascites  · Continue home medications  · Patient started on Lasix in the hospital   Continue    · Outpatient follow-up with GI

## 2020-08-20 NOTE — PROGRESS NOTES
Progress note - Gastroenterology   Brett Whelan 62 y o  male MRN: [id number]  Unit/Bed#: -01 Encounter: [id number]    ASSESSMENT and PLAN    1  Alcoholic cirrhosis   History of alcoholic cirrhosis, noncompliant  Decompensated with ascites  Also has pancytopenia  LFT's showed alk phos 118  WBC 1 71, hemoglobin 7 4, platelet count 49  No evidence of hepatic encephalopathy  US abdomen with dopplers 8/19   Cirrhotic liver, no portal vein thrombosis, splenomegaly, small amount of ascites     - continue Spirinolactone daily   - continue Lasix 40 mg daily   - continue to trend CMP, CBC   - continue Lactulose TID   - reinforced low sodium diet     2  Dysphagia   3  GERD   History of GERD  Continues with worsening solid food dysphagia that occurs multiple times per day  Denies any issues with liquids or swallowing pills  Unable to have EGD with dilation due to esophageal varices  - continue Protonix 40 mg daily   - Nutrition consult   - pt refused barium swallow     4  Esophageal varices   Last EGD performed on 08/05/2020 which showed small varices with no bleeding in the esophagus  Attempted to band, patient had diffuse scarring was unable to suction for banding  Also consistent with portal hypertensive gastropathy  - recommend repeat EGD in 1 year  - continue Propranolol    5  Colon cancer screening   Last colonoscopy performed at [facility] on 11/23/2019 showed large internal and external hemorrhoids, rectal varices without bleeding and tubular adenomatous polyp  Five year recall recommended November 2024  Chief Complaint   Patient presents with   TIERNEY [facility]     patient presents to the ED withj c/o continous bloating  left from ED earlier today AMA and returns now to be admitted        [address] [facility]   Patient denies any complaints this morning  Denies any abdominal pain or distention, fever, chills, nausea, vomiting, hematochezia or melena      /61   Pulse 68   Temp 98 2 °F (36 8 °C) Resp 18   Ht 6' (1 829 m)   Wt 64 4 kg (141 lb 14 4 oz)   SpO2 100%   BMI 19 25 kg/m²     PHYSICALEXAM  General appearance: alert, appears stated age and cooperative  Eyes: PERLLA, EOMI, no icterus   Head: Normocephalic, without obvious abnormality, atraumatic  Lungs: clear to auscultation bilaterally  Heart: regular rate and rhythm, S1, S2 normal, no murmur, click, rub or gallop  Abdomen: soft, non-tender; no ascites, bowel sounds normal; no masses,  no organomegaly  Extremities: extremities normal, atraumatic, no cyanosis or edema  Neurologic: Grossly normal    Lab Results   Component Value Date    GLUCOSE 135 08/27/2015    CALCIUM 7 3 (L) 08/20/2020     08/27/2015    K 3 4 (L) 08/20/2020    CO2 26 08/20/2020     08/20/2020    BUN 3 (L) 08/20/2020    CREATININE 0 73 08/20/2020     Lab Results   Component Value Date    WBC 1 71 (LL) 08/20/2020    HGB 7 4 (L) 08/20/2020    HCT 24 0 (L) 08/20/2020    MCV 74 (L) 08/20/2020    PLT 49 (LL) 08/20/2020     Lab Results   Component Value Date    ALT 10 (L) 08/20/2020    AST 13 08/20/2020    ALKPHOS 118 (H) 08/20/2020    BILITOT 0 82 08/27/2015     No results found for: AMYLASE  Lab Results   Component Value Date    LIPASE 133 01/03/2020     No results found for: IRON, TIBC, FERRITIN  Lab Results   Component Value Date    INR 1 34 (H) 08/18/2020

## 2020-08-20 NOTE — PLAN OF CARE
Problem: Potential for Falls  Goal: Patient will remain free of falls  Description: INTERVENTIONS:  - Assess patient frequently for physical needs  -  Identify cognitive and physical deficits and behaviors that affect risk of falls  -  [place] fall precautions as indicated by assessment   - Educate patient/family on patient safety including physical limitations  - Instruct patient to call for assistance with activity based on assessment  - Modify environment to reduce risk of injury  - Consider OT/PT consult to assist with strengthening/mobility  Outcome: Progressing     Problem: Nutrition/Hydration-ADULT  Goal: Nutrient/Hydration intake appropriate for improving, restoring or maintaining nutritional needs  Description: Monitor and assess patient's nutrition/hydration status for malnutrition  Collaborate with interdisciplinary team and initiate plan and interventions as ordered  Monitor patient's weight and dietary intake as ordered or per policy  Utilize nutrition screening tool and intervene as necessary  Determine patient's food preferences and provide high-protein, high-caloric foods as appropriate       INTERVENTIONS:  - Monitor oral intake, urinary output, labs, and treatment plans  - Assess nutrition and hydration status and recommend course of action  - Evaluate amount of meals eaten  - Assist patient with eating if necessary   - Allow adequate time for meals  - Recommend/ encourage appropriate diets, oral nutritional supplements, and vitamin/mineral supplements  - Order, calculate, and assess calorie counts as needed  - Recommend, monitor, and adjust tube feedings and TPN/PPN based on assessed needs  - Assess need for intravenous fluids  - Provide specific nutrition/hydration education as appropriate  - Include patient/family/caregiver in decisions related to nutrition  Outcome: Progressing     Problem: PAIN - ADULT  Goal: Verbalizes/displays adequate comfort level or baseline comfort level  Description: Interventions:  - Encourage patient to monitor pain and request assistance  - Assess pain using appropriate pain scale  - Administer analgesics based on type and severity of pain and evaluate response  - Implement non-pharmacological measures as appropriate and evaluate response  - Consider cultural and social influences on pain and pain management  - Notify physician/advanced practitioner if interventions unsuccessful or patient reports new pain  Outcome: Progressing     Problem: INFECTION - ADULT  Goal: Absence or prevention of progression during hospitalization  Description: INTERVENTIONS:  - Assess and monitor for signs and symptoms of infection  - Monitor lab/diagnostic results  - Monitor all insertion sites, i e  indwelling lines, tubes, and drains  - Monitor endotracheal if appropriate and nasal secretions for changes in amount and color  - [place] appropriate cooling/warming therapies per order  - Administer medications as ordered  - Instruct and encourage patient and family to use good hand hygiene technique  - Identify and instruct in appropriate isolation precautions for identified infection/condition  Outcome: Progressing  Goal: Absence of fever/infection during neutropenic period  Description: INTERVENTIONS:  - Monitor WBC    Outcome: Progressing     Problem: SAFETY ADULT  Goal: Patient will remain free of falls  Description: INTERVENTIONS:  - Assess patient frequently for physical needs  -  Identify cognitive and physical deficits and behaviors that affect risk of falls    -  [place] fall precautions as indicated by assessment   - Educate patient/family on patient safety including physical limitations  - Instruct patient to call for assistance with activity based on assessment  - Modify environment to reduce risk of injury  - Consider OT/PT consult to assist with strengthening/mobility  Outcome: Progressing  Goal: Maintain or return to baseline ADL function  Description: INTERVENTIONS:  -  Assess patient's ability to carry out ADLs; assess patient's baseline for ADL function and identify physical deficits which impact ability to perform ADLs (bathing, care of mouth/teeth, toileting, grooming, dressing, etc )  - Assess/evaluate cause of self-care deficits   - Assess range of motion  - Assess patient's mobility; develop plan if impaired  - Assess patient's need for assistive devices and provide as appropriate  - Encourage maximum independence but intervene and supervise when necessary  - Involve family in performance of ADLs  - Assess for home care needs following discharge   - Consider OT consult to assist with ADL evaluation and planning for discharge  - Provide patient education as appropriate  Outcome: Progressing  Goal: Maintain or return mobility status to optimal level  Description: INTERVENTIONS:  - Assess patient's baseline mobility status (ambulation, transfers, stairs, etc )    - Identify cognitive and physical deficits and behaviors that affect mobility  - Identify mobility aids required to assist with transfers and/or ambulation (gait belt, sit-to-stand, lift, walker, cane, etc )  - [place] fall precautions as indicated by assessment  - Record patient progress and toleration of activity level on Mobility SBAR; progress patient to next Phase/Stage  - Instruct patient to call for assistance with activity based on assessment  - Consider rehabilitation consult to assist with strengthening/weightbearing, etc   Outcome: Progressing     Problem: DISCHARGE PLANNING  Goal: Discharge to home or other facility with appropriate resources  Description: INTERVENTIONS:  - Identify barriers to discharge w/patient and caregiver  - Arrange for needed discharge resources and transportation as appropriate  - Identify discharge learning needs (meds, wound care, etc )  - Arrange for interpretive services to assist at discharge as needed  - Refer to Case Management Department for coordinating discharge planning if the patient needs post-hospital services based on physician/advanced practitioner order or complex needs related to functional status, cognitive ability, or social support system  Outcome: Progressing     Problem: Knowledge Deficit  Goal: Patient/family/caregiver demonstrates understanding of disease process, treatment plan, medications, and discharge instructions  Description: Complete learning assessment and assess knowledge base    Interventions:  - Provide teaching at level of understanding  - Provide teaching via preferred learning methods  Outcome: Progressing     Problem: GASTROINTESTINAL - ADULT  Goal: Minimal or absence of nausea and/or vomiting  Description: INTERVENTIONS:  - Administer IV fluids if ordered to ensure adequate hydration  - Maintain NPO status until nausea and vomiting are resolved  - Nasogastric tube if ordered  - Administer ordered antiemetic medications as needed  - Provide nonpharmacologic comfort measures as appropriate  - Advance diet as tolerated, if ordered  - Consider nutrition services referral to assist patient with adequate nutrition and appropriate food choices  Outcome: Progressing  Goal: Maintains or returns to baseline bowel function  Description: INTERVENTIONS:  - Assess bowel function  - Encourage oral fluids to ensure adequate hydration  - Administer IV fluids if ordered to ensure adequate hydration  - Administer ordered medications as needed  - Encourage mobilization and activity  - Consider nutritional services referral to assist patient with adequate nutrition and appropriate food choices  Outcome: Progressing  Goal: Maintains adequate nutritional intake  Description: INTERVENTIONS:  - Monitor percentage of each meal consumed  - Identify factors contributing to decreased intake, treat as appropriate  - Assist with meals as needed  - Monitor I&O, weight, and lab values if indicated  - Obtain nutrition services referral as needed  Outcome: Progressing     Problem: METABOLIC, FLUID AND ELECTROLYTES - ADULT  Goal: Electrolytes maintained within normal limits  Description: INTERVENTIONS:  - Monitor labs and assess patient for signs and symptoms of electrolyte imbalances  - Administer electrolyte replacement as ordered  - Monitor response to electrolyte replacements, including repeat lab results as appropriate  - Instruct patient on fluid and nutrition as appropriate  Outcome: Progressing  Goal: Fluid balance maintained  Description: INTERVENTIONS:  - Monitor labs   - Monitor I/O and WT  - Instruct patient on fluid and nutrition as appropriate  - Assess for signs & symptoms of volume excess or deficit  Outcome: Progressing     Problem: SKIN/TISSUE INTEGRITY - ADULT  Goal: Skin integrity remains intact  Description: INTERVENTIONS  - Identify patients at risk for skin breakdown  - Assess and monitor skin integrity  - Assess and monitor nutrition and hydration status  - Monitor labs (i e  albumin)  - Assess for incontinence   - Turn and reposition patient  - Assist with mobility/ambulation  - Relieve pressure over bony prominences  - Avoid friction and shearing  - Provide appropriate hygiene as needed including keeping skin clean and dry  - Evaluate need for skin moisturizer/barrier cream  - Collaborate with interdisciplinary team (i e  Nutrition, Rehabilitation, etc )   - Patient/family teaching  Outcome: Progressing  Goal: Incision(s), wounds(s) or drain site(s) healing without S/S of infection  Description: INTERVENTIONS  - Assess and document risk factors for skin impairment   - Assess and document dressing, incision, wound bed, drain sites and surrounding tissue  - Consider nutrition services referral as needed  - Oral mucous membranes remain intact  - Provide patient/ family education  Outcome: Progressing  Goal: Oral mucous membranes remain intact  Description: INTERVENTIONS  - Assess oral mucosa and hygiene practices  - Implement preventative oral hygiene regimen  - Implement oral medicated treatments as ordered  - Initiate Nutrition services referral as needed  Outcome: Progressing     Problem: HEMATOLOGIC - ADULT  Goal: Maintains hematologic stability  Description: INTERVENTIONS  - Assess for signs and symptoms of bleeding or hemorrhage  - Monitor labs  - Administer supportive blood products/factors as ordered and appropriate  Outcome: Progressing

## 2020-08-20 NOTE — DISCHARGE SUMMARY
Discharge- Jaci Chiu 1960, 62 y o  male MRN: [id number]    Unit/Bed#: -01 Encounter: [id number]    Primary Care Provider: Jordi Emmanuel   Date and time admitted to hospital: 8/18/2020  3:44 PM        * Alcoholic cirrhosis of liver with ascites ([facility] [address] )  Assessment & Plan  · Alcoholic cirrhosis of liver with ascites  Recently hospitalized here for fungemia but left against medical advice  · Came in with increasing abdominal distension  Ultrasound with minimal ascites  · Continue home medications  · Patient started on Lasix in the hospital   Continue  · Outpatient follow-up with GI  Fungemia  Assessment & Plan  · Recent admission for fungemia  Source unclear  · 8/11/2020 ED visit  Fever but requested to be discharged home  · 8/12/2020 admission  Admitted to ICU service for septic shock requiring vasopressors and was placed on ceftriaxone/metronidazole  Left AMA  · 8/15/2020 admission  Was called back for fungemia  Was on IV fluconazole but left against medical advice as echocardiogram was recommended as well as monitoring of liver function  Prescription for fluconazole 400 mg 14 day supply was sent to his pharmacy  · ID following  Echocardiogram done  Negative for endocarditis  · Patient be discharged on 14 days of oral fluconazole  Esophageal dysphagia  Assessment & Plan  · Esophageal dysphagia  Patient reports having history of esophageal dilatation however not found in recent reports  · GI following  · Patient refusing barium swallow            Discharging Physician / Practitioner: Mildred Gottron, MD  PCP: Jordi Emmanuel  Admission Date:   Admission Orders (From admission, onward)     Ordered        08/18/20 1613  Inpatient Admission (expected length of stay for this patient Order details is greater than two midnights)  Once                   Discharge Date: 08/20/20    Resolved Problems  Date Reviewed: 8/20/2020    None          Consultations During Hospital Stay:  · GI, infectious disease    Procedures Performed:   · None    Significant Findings / Test Results:     US abdomen complete with doppler   Final Result by Dimple Hayden MD (08/19 1609)      Limited study  Cirrhotic liver  No focal lesions appreciated  No evidence of portal vein thrombosis  Splenomegaly  Incompletely evaluated small ascites  Workstation performed: [id number]         FL barium swallow    (Results Pending)        Incidental Findings:   · none     Test Results Pending at Discharge (will require follow up):   · none     Outpatient Tests Requested:  · CMP in 1 week    Complications:  none    Reason for Admission:  Abdominal distension    Hospital Course:     Bay Palomo is a 62 y o  male patient who originally presented to the hospital on 8/18/2020 due to abdominal distension and bloating  The patient had several recent ED visits and hospitalizations  On 8/11/2020 he was seen in the ED with fever but requested discharge home  The next day he was admitted to intensive care unit for septic shock requiring vasopressors but left AMA  He was subsequently seen during hospitalization on 8/15-8/17 after being called back to the hospital for fungemia  He was started on fluconazole which needs close monitoring for hepatotoxicity especially with underlying cirrhosis  He left AMA on 8/17/2020 but prescription was called in for fluconazole 14 day supply  He has been having increased abdominal bloating today which prompted re-evaluation and admission  Patient had an ultrasound of the abdomen that showed minimal ascites  With stigmata of cirrhosis  Has been asymptomatic and afebrile  Was started on IV fluconazole for recent fungemia  Patient adamant that he wants to leave today P evaluated by ID  Cleared for discharge from that standpoint  To be discharged home on oral fluconazole for 14 days  Exam outpatient follow-up with GI      Please see above list of diagnoses and related plan for additional information  Condition at Discharge: good     Discharge Day Visit / Exam:     Subjective:  Pt seen and examined by me this morning  Pt denied any complaints  Vitals: Blood Pressure: 109/61 (08/20/20 0855)  Pulse: 68 (08/19/20 0901)  Temperature: 98 2 °F (36 8 °C) (08/20/20 0855)  Temp Source: Temporal (08/18/20 1548)  Respirations: 18 (08/19/20 2239)  Height: 6' (182 9 cm) (08/18/20 1829)  Weight - Scale: 64 4 kg (141 lb 14 4 oz) (08/19/20 0545)  SpO2: 100 % (08/18/20 1548)     Exam:   Physical Exam    Constitutional: Pt appears comfortable  Not in any acute distress  HENT:   Head: Normocephalic and atraumatic  Eyes: EOM are normal    Neck: Neck supple  Cardiovascular: Normal rate, regular rhythm, normal heart sounds  No murmur heard  Pulmonary/Chest: Effort normal, air entry b/l equal  No respiratory distress  Pt has no wheezes or crackles  Abdominal: Soft  Non-distended, Non-tender  Bowel sounds are normal    Musculoskeletal: Normal range of motion  Neurological: awake, alert  Moving all extremities spontaneously  Psychiatric: normal mood and affect  Discussion with Family: pt    Discharge instructions/Information to patient and family:   See after visit summary for information provided to patient and family  Provisions for Follow-Up Care:  See after visit summary for information related to follow-up care and any pertinent home health orders  Disposition:     Home    For Discharges to [address] [facility]:   · Not Applicable to this Patient - Not Applicable to this Patient    Planned Readmission: no     Discharge Statement:  I spent 35 minutes discharging the patient  This time was spent on the day of discharge  I had direct contact with the patient on the day of discharge   Greater than 50% of the total time was spent examining patient, answering all patient questions, arranging and discussing plan of care with patient as well as directly providing post-discharge instructions  Additional time then spent on discharge activities  Discharge Medications:  See after visit summary for reconciled discharge medications provided to patient and family        ** Please Note: This note has been constructed using a voice recognition system **

## 2020-08-20 NOTE — ASSESSMENT & PLAN NOTE
· Recent admission for fungemia  Source unclear  · 8/11/2020 ED visit  Fever but requested to be discharged home  · 8/12/2020 admission  Admitted to ICU service for septic shock requiring vasopressors and was placed on ceftriaxone/metronidazole  Left AMA  · 8/15/2020 admission  Was called back for fungemia  Was on IV fluconazole but left against medical advice as echocardiogram was recommended as well as monitoring of liver function  Prescription for fluconazole 400 mg 14 day supply was sent to his pharmacy  · ID following  Echocardiogram done  Negative for endocarditis  · Patient be discharged on 14 days of oral fluconazole

## 2020-08-20 NOTE — SOCIAL WORK
LOS: 2, URR: 44, pt is a 30 day re-admit  Pt for discharge home today  Found to be smoking in the bathroom earlier by staff  Pt does not want CM to make follow up PCP appointment will same himself  Pt does not have [facility] PCP and does not qualify for OP CM follow up  Pt drove here and will drive self home

## 2020-08-21 LAB
BACTERIA BLD CULT: NORMAL

## 2020-08-23 DIAGNOSIS — K70.30 ALCOHOLIC CIRRHOSIS OF LIVER WITHOUT ASCITES (HCC): ICD-10-CM

## 2020-08-24 ENCOUNTER — PATIENT OUTREACH (OUTPATIENT)
Dept: CASE MANAGEMENT | Facility: OTHER | Age: 60
End: 2020-08-24

## 2020-08-24 RX ORDER — SPIRONOLACTONE 25 MG/1
TABLET ORAL
Qty: 90 TABLET | OUTPATIENT
Start: 2020-08-24

## 2020-08-24 NOTE — PROGRESS NOTES
In basket notification of discharge received  Chart reviewed  Outreach attempt  I spoke with patient who "feels much better"  He has a follow up appointment with PCP on Wednesday  Patient stated he has all of his medications and takes them as directed  Patient states he was "discharged" from [facility]  He understands he need to follow up with a gastroenterologist  He stated he was given information at discharge of GI specialist  It is noted on his AVS    Patient states he has not eaten "solid food for months"  He is able to eat soft foods, such as oatmeal and eggs  Patient did have a nutrition consult while hospitalized and saw a dietician on 8/16  I advised patient I would call him after his PCP office visit

## 2020-08-31 ENCOUNTER — HOSPITAL ENCOUNTER (EMERGENCY)
Facility: HOSPITAL | Age: 60
End: 2020-08-31
Attending: EMERGENCY MEDICINE
Payer: COMMERCIAL

## 2020-08-31 VITALS
DIASTOLIC BLOOD PRESSURE: 67 MMHG | TEMPERATURE: 97.9 F | RESPIRATION RATE: 19 BRPM | HEART RATE: 65 BPM | OXYGEN SATURATION: 98 % | SYSTOLIC BLOOD PRESSURE: 161 MMHG

## 2020-08-31 DIAGNOSIS — Z53.21 ELOPED FROM EMERGENCY DEPARTMENT: Primary | ICD-10-CM

## 2020-08-31 PROCEDURE — 99282 EMERGENCY DEPT VISIT SF MDM: CPT

## 2020-08-31 PROCEDURE — 99284 EMERGENCY DEPT VISIT MOD MDM: CPT | Performed by: PHYSICIAN ASSISTANT

## 2020-08-31 RX ORDER — SODIUM PHOSPHATE,MONO-DIBASIC 19G-7G/118
1 ENEMA (ML) RECTAL ONCE
Status: DISCONTINUED | OUTPATIENT
Start: 2020-08-31 | End: 2020-08-31 | Stop reason: HOSPADM

## 2020-08-31 NOTE — ED PROVIDER NOTES
History  Chief Complaint   Patient presents with    Constipation     pt states he hasnt been able to have a BM since yesterday  67-year-old male with history of polysubstance abuse, alcoholic cirrhosis with ascites, COPD, and chronic pain presents to the emergency department for evaluation of constipation x1 day  He has been compliant with his lactulose  He was recently discharged from this facility with a 2 week course of fluconazole to treat fungemia  He has also been compliant with his diuretics  Denies any abdominal pain  He also notes that he has some difficulty voiding his bladder however no pain during urination  Denies any fevers or chills  Prior to Admission Medications   Prescriptions Last Dose Informant Patient Reported? Taking?    albuterol (2 5 mg/3 mL) 0 083 % nebulizer solution  Self Yes No   Sig: Take 2 5 mg by nebulization every 6 (six) hours as needed for wheezing or shortness of breath   albuterol (PROVENTIL HFA,VENTOLIN HFA) 90 mcg/act inhaler  Self Yes No   Sig: Inhale 2 puffs as needed for shortness of breath   fluconazole (DIFLUCAN) 200 mg tablet   No No   Sig: Take 2 tablets (400 mg total) by mouth daily for 14 days   furosemide (LASIX) 40 mg tablet   No No   Sig: Take 1 tablet (40 mg total) by mouth daily   lactulose 20 g/30 mL  Self No No   Sig: Take 30 mL (20 g total) by mouth 3 (three) times a day   oxyCODONE HCl (ROXICODONE PO)   Yes No   Sig: Take 10 mg by mouth every 8 (eight) hours as needed for moderate pain   pantoprazole (PROTONIX) 40 mg tablet  Self No No   Sig: Take 1 tablet (40 mg total) by mouth daily   propranolol (INDERAL) 20 mg tablet  Self No No   Sig: Take 1 tablet (20 mg total) by mouth daily   Patient not taking: Reported on 8/15/2020   spironolactone (ALDACTONE) 50 mg tablet   No No   Sig: Take 1 tablet (50 mg total) by mouth daily   Patient not taking: Reported on 8/15/2020      Facility-Administered Medications: None       Past Medical History: Diagnosis Date    Ascites     Cardiac disease     Chronic left-sided headaches     Chronic pain     back and neck    Chronic pain disorder     COPD (chronic obstructive pulmonary disease) (HCC)     Esophageal varices in cirrhosis (HCC)     Fungemia     History of transfusion     Hypertension     Hypokalemia 1/3/2020       Past Surgical History:   Procedure Laterality Date    BACK SURGERY      CERVICAL FUSION      CHOLECYSTECTOMY      COLONOSCOPY  11/23/2019    Internal external hemorrhoids, nonbleeding rectal varices    CORONARY ANGIOPLASTY WITH STENT PLACEMENT      2006-[facility]/STENT to mid and distal RCA; then 2009 Left-LAD normal, circumflex-normal,Proximal % stnosis, Chronic total occlusion    EGD  01/11/2006    GALLBLADDER SURGERY      HERNIA REPAIR      IR PARACENTESIS  7/15/2020    IR PARACENTESIS  8/13/2020    JOINT REPLACEMENT Left     knee    KNEE SURGERY      NECK SURGERY      PARACENTESIS      [facility]    UPPER GASTROINTESTINAL ENDOSCOPY  01/11/2006    Gastritis and duodenitis  Pathology negative for any abnormality    UPPER GASTROINTESTINAL ENDOSCOPY  11/23/2019    Grade 3 esophageal varices and portal hypertensive gastropathy       Family History   Problem Relation Age of Onset    Heart disease Father     Heart disease Brother     Cardiomyopathy Brother     Colon polyps Neg Hx     Colon cancer Neg Hx      I have reviewed and agree with the history as documented      E-Cigarette/Vaping    E-Cigarette Use Never User      E-Cigarette/Vaping Substances    Nicotine No     THC No     CBD No     Flavoring No     Other No     Unknown No      Social History     Tobacco Use    Smoking status: Current Every Day Smoker     Packs/day: 1 00     Years: 45 00     Pack years: 45 00     Types: Cigarettes    Smokeless tobacco: Never Used    Tobacco comment: encouraged smoking cessation   Substance Use Topics    Alcohol use: Not Currently    Drug use: No       Review of Systems Constitutional: Negative for chills, diaphoresis and fever  Eyes: Negative for visual disturbance  Respiratory: Negative for cough and shortness of breath  Cardiovascular: Negative for chest pain and palpitations  Gastrointestinal: Positive for constipation  Negative for abdominal pain, blood in stool, diarrhea, nausea and vomiting  Genitourinary: Negative for dysuria, flank pain and frequency  Musculoskeletal: Negative for arthralgias and myalgias  Skin: Negative for color change, rash and wound  Allergic/Immunologic: Negative for immunocompromised state  Neurological: Negative for dizziness and light-headedness  Hematological: Does not bruise/bleed easily  Psychiatric/Behavioral: Negative for confusion  The patient is not nervous/anxious  Physical Exam  Physical Exam  Vitals signs reviewed  Constitutional:       General: He is not in acute distress  Appearance: He is well-developed  He is not diaphoretic  HENT:      Head: Normocephalic and atraumatic  Eyes:      General: No scleral icterus  Pupils: Pupils are equal, round, and reactive to light  Neck:      Vascular: No JVD  Cardiovascular:      Rate and Rhythm: Normal rate and regular rhythm  Heart sounds: No murmur  No friction rub  No gallop  Pulmonary:      Effort: No respiratory distress  Breath sounds: No wheezing or rales  Abdominal:      General: Abdomen is flat  Bowel sounds are normal  There is no distension  Palpations: There is no mass  Tenderness: There is no abdominal tenderness  Hernia: No hernia is present  Skin:     General: Skin is warm and dry  Neurological:      Mental Status: He is alert and oriented to person, place, and time           Vital Signs  ED Triage Vitals   Temperature Pulse Respirations Blood Pressure SpO2   08/31/20 1444 08/31/20 1443 08/31/20 1443 08/31/20 1444 08/31/20 1443   97 9 °F (36 6 °C) 65 19 161/67 98 %      Temp src Heart Rate Source Patient Position - Orthostatic VS BP Location FiO2 (%)   -- 08/31/20 1443 -- -- --    Monitor         Pain Score       --                  Vitals:    08/31/20 1443 08/31/20 1444   BP:  161/67   Pulse: 65          Visual Acuity      ED Medications  Medications - No data to display    Diagnostic Studies  Results Reviewed     None                 No orders to display              Procedures  Procedures         ED Course  ED Course as of Aug 31 1908   Mon Aug 31, 2020   1537 Pt left prior to receiving enema          US AUDIT      Most Recent Value   Initial Alcohol Screen: US AUDIT-C    1  How often do you have a drink containing alcohol?  0 Filed at: 08/31/2020 1443   2  How many drinks containing alcohol do you have on a typical day you are drinking? 0 Filed at: 08/31/2020 1443   3a  Male UNDER 65: How often do you have five or more drinks on one occasion? 0 Filed at: 08/31/2020 1443   3b  FEMALE Any Age, or MALE 65+: How often do you have 4 or more drinks on one occassion? 0 Filed at: 08/31/2020 1443   Audit-C Score  0 Filed at: 08/31/2020 1443                  AUSTIN/DAST-10      Most Recent Value   How many times in the past year have you    Used an illegal drug or used a prescription medication for non-medical reasons?   Never Filed at: 08/31/2020 1443                                MDM  Number of Diagnoses or Management Options  Eloped from emergency department:   Diagnosis management comments: Patient eloped prior to treatment or further assessment       Amount and/or Complexity of Data Reviewed  Review and summarize past medical records: yes          Disposition  Final diagnoses:   Eloped from emergency department     Time reflects when diagnosis was documented in both MDM as applicable and the Disposition within this note     Time User Action Codes Description Comment    8/31/2020  7:08 PM Fred Campos Add [Z53 [id number]] Eloped from emergency department       ED Disposition     ED Disposition Condition Date/Time Comment    Eloped  Mon Aug 31, 2020  3:46 PM       Follow-up Information    None         Discharge Medication List as of 8/31/2020  3:47 PM      CONTINUE these medications which have NOT CHANGED    Details   albuterol (2 5 mg/3 mL) 0 083 % nebulizer solution Take 2 5 mg by nebulization every 6 (six) hours as needed for wheezing or shortness of breath, Historical Med      albuterol (PROVENTIL HFA,VENTOLIN HFA) 90 mcg/act inhaler Inhale 2 puffs as needed for shortness of breath, Historical Med      fluconazole (DIFLUCAN) 200 mg tablet Take 2 tablets (400 mg total) by mouth daily for 14 days, Starting Thu 8/20/2020, Until Thu 9/3/2020, Normal      furosemide (LASIX) 40 mg tablet Take 1 tablet (40 mg total) by mouth daily, Starting Fri 8/21/2020, Normal      lactulose 20 g/30 mL Take 30 mL (20 g total) by mouth 3 (three) times a day, Starting Wed 7/8/2020, Normal      oxyCODONE HCl (ROXICODONE PO) Take 10 mg by mouth every 8 (eight) hours as needed for moderate pain, Historical Med      pantoprazole (PROTONIX) 40 mg tablet Take 1 tablet (40 mg total) by mouth daily, Starting Wed 6/17/2020, Normal      propranolol (INDERAL) 20 mg tablet Take 1 tablet (20 mg total) by mouth daily, Starting Thu 5/7/2020, Normal      spironolactone (ALDACTONE) 50 mg tablet Take 1 tablet (50 mg total) by mouth daily, Starting Fri 7/31/2020, Until Thu 10/29/2020, Normal           No discharge procedures on file      PDMP Review       Value Time User    PDMP Reviewed  Yes 8/18/2020  6:34 PM Tyler Toledo DO          ED Provider  Electronically Signed by           James Damon PA-C  08/31/20 4702

## 2020-08-31 NOTE — ED NOTES
Pt ringing call bell at this time, this ED tech entered room, pt states "I'm leaving  I don't want to stay so I'm leaving " Pt left at this time, RN made aware        Sharlet Aase  08/31/20 1526

## 2020-09-03 ENCOUNTER — PATIENT OUTREACH (OUTPATIENT)
Dept: CASE MANAGEMENT | Facility: OTHER | Age: 60
End: 2020-09-03

## 2020-09-03 NOTE — PROGRESS NOTES
Outreach attempt  I received a busy signal when I called the only phone number listed for the patient

## 2020-09-05 DIAGNOSIS — I85.10 SECONDARY ESOPHAGEAL VARICES WITHOUT BLEEDING (HCC): ICD-10-CM

## 2020-09-08 RX ORDER — PROPRANOLOL HYDROCHLORIDE 20 MG/1
20 TABLET ORAL DAILY
Qty: 90 TABLET | Refills: 1 | Status: SHIPPED | OUTPATIENT
Start: 2020-09-08

## 2020-09-11 ENCOUNTER — PATIENT OUTREACH (OUTPATIENT)
Dept: CASE MANAGEMENT | Facility: OTHER | Age: 60
End: 2020-09-11

## 2020-09-28 ENCOUNTER — PATIENT OUTREACH (OUTPATIENT)
Dept: CASE MANAGEMENT | Facility: OTHER | Age: 60
End: 2020-09-28

## 2020-09-28 NOTE — PROGRESS NOTES
Third outreach attempted with no response  I left a message with my contact information on patient's voicemail

## 2020-10-09 ENCOUNTER — HOSPITAL ENCOUNTER (EMERGENCY)
Facility: HOSPITAL | Age: 60
Discharge: HOME | End: 2020-10-09
Attending: EMERGENCY MEDICINE
Payer: COMMERCIAL

## 2020-10-09 VITALS
WEIGHT: 141 LBS | SYSTOLIC BLOOD PRESSURE: 147 MMHG | DIASTOLIC BLOOD PRESSURE: 67 MMHG | HEART RATE: 80 BPM | TEMPERATURE: 98 F | RESPIRATION RATE: 16 BRPM | BODY MASS INDEX: 19.12 KG/M2 | OXYGEN SATURATION: 100 %

## 2020-10-09 DIAGNOSIS — R30.0 DYSURIA: ICD-10-CM

## 2020-10-09 DIAGNOSIS — Z46.6 ENCOUNTER FOR FOLEY CATHETER REMOVAL: Primary | ICD-10-CM

## 2020-10-09 DIAGNOSIS — R10.9 ABDOMINAL PAIN: ICD-10-CM

## 2020-10-09 PROCEDURE — 99284 EMERGENCY DEPT VISIT MOD MDM: CPT | Performed by: EMERGENCY MEDICINE

## 2020-10-09 PROCEDURE — 99283 EMERGENCY DEPT VISIT LOW MDM: CPT

## 2020-11-11 ENCOUNTER — EPISODE CHANGES (OUTPATIENT)
Dept: CASE MANAGEMENT | Facility: OTHER | Age: 60
End: 2020-11-11

## 2020-12-01 ENCOUNTER — HOSPITAL ENCOUNTER (EMERGENCY)
Facility: HOSPITAL | Age: 60
Discharge: LEFT BEFORE DISCHARGE | End: 2020-12-01
Attending: EMERGENCY MEDICINE | Admitting: EMERGENCY MEDICINE
Payer: COMMERCIAL

## 2020-12-01 VITALS
RESPIRATION RATE: 18 BRPM | DIASTOLIC BLOOD PRESSURE: 83 MMHG | HEIGHT: 72 IN | WEIGHT: 141 LBS | SYSTOLIC BLOOD PRESSURE: 177 MMHG | BODY MASS INDEX: 19.1 KG/M2 | HEART RATE: 70 BPM | OXYGEN SATURATION: 100 %

## 2020-12-01 DIAGNOSIS — R14.0 ABDOMINAL DISTENTION: Primary | ICD-10-CM

## 2020-12-01 LAB
ALBUMIN SERPL BCP-MCNC: 3 G/DL (ref 3.5–5)
ALP SERPL-CCNC: 179 U/L (ref 46–116)
ALT SERPL W P-5'-P-CCNC: 18 U/L (ref 12–78)
ANION GAP SERPL CALCULATED.3IONS-SCNC: 8 MMOL/L (ref 4–13)
AST SERPL W P-5'-P-CCNC: 18 U/L (ref 5–45)
BASOPHILS # BLD AUTO: 0 THOUSANDS/ΜL (ref 0–0.1)
BASOPHILS NFR BLD AUTO: 0 % (ref 0–1)
BILIRUB SERPL-MCNC: 0.4 MG/DL (ref 0.2–1)
BUN SERPL-MCNC: 6 MG/DL (ref 5–25)
CALCIUM ALBUM COR SERPL-MCNC: 8.7 MG/DL (ref 8.3–10.1)
CALCIUM SERPL-MCNC: 7.9 MG/DL (ref 8.3–10.1)
CHLORIDE SERPL-SCNC: 106 MMOL/L (ref 100–108)
CO2 SERPL-SCNC: 27 MMOL/L (ref 21–32)
CREAT SERPL-MCNC: 0.87 MG/DL (ref 0.6–1.3)
EOSINOPHIL # BLD AUTO: 0.06 THOUSAND/ΜL (ref 0–0.61)
EOSINOPHIL NFR BLD AUTO: 3 % (ref 0–6)
ERYTHROCYTE [DISTWIDTH] IN BLOOD BY AUTOMATED COUNT: 20.3 % (ref 11.6–15.1)
GFR SERPL CREATININE-BSD FRML MDRD: 94 ML/MIN/1.73SQ M
GLUCOSE SERPL-MCNC: 125 MG/DL (ref 65–140)
HCT VFR BLD AUTO: 28.6 % (ref 36.5–49.3)
HGB BLD-MCNC: 8.5 G/DL (ref 12–17)
IMM GRANULOCYTES # BLD AUTO: 0.01 THOUSAND/UL (ref 0–0.2)
IMM GRANULOCYTES NFR BLD AUTO: 1 % (ref 0–2)
LIPASE SERPL-CCNC: 656 U/L (ref 73–393)
LYMPHOCYTES # BLD AUTO: 0.4 THOUSANDS/ΜL (ref 0.6–4.47)
LYMPHOCYTES NFR BLD AUTO: 20 % (ref 14–44)
MCH RBC QN AUTO: 22.3 PG (ref 26.8–34.3)
MCHC RBC AUTO-ENTMCNC: 29.7 G/DL (ref 31.4–37.4)
MCV RBC AUTO: 75 FL (ref 82–98)
MONOCYTES # BLD AUTO: 0.15 THOUSAND/ΜL (ref 0.17–1.22)
MONOCYTES NFR BLD AUTO: 7 % (ref 4–12)
NEUTROPHILS # BLD AUTO: 1.4 THOUSANDS/ΜL (ref 1.85–7.62)
NEUTS SEG NFR BLD AUTO: 69 % (ref 43–75)
NRBC BLD AUTO-RTO: 0 /100 WBCS
PLATELET # BLD AUTO: 55 THOUSANDS/UL (ref 149–390)
PMV BLD AUTO: 11 FL (ref 8.9–12.7)
POTASSIUM SERPL-SCNC: 2.8 MMOL/L (ref 3.5–5.3)
PROT SERPL-MCNC: 6.5 G/DL (ref 6.4–8.2)
RBC # BLD AUTO: 3.82 MILLION/UL (ref 3.88–5.62)
SODIUM SERPL-SCNC: 141 MMOL/L (ref 136–145)
WBC # BLD AUTO: 2.02 THOUSAND/UL (ref 4.31–10.16)

## 2020-12-01 PROCEDURE — 93005 ELECTROCARDIOGRAM TRACING: CPT

## 2020-12-01 PROCEDURE — 99285 EMERGENCY DEPT VISIT HI MDM: CPT | Performed by: EMERGENCY MEDICINE

## 2020-12-01 PROCEDURE — 85025 COMPLETE CBC W/AUTO DIFF WBC: CPT | Performed by: EMERGENCY MEDICINE

## 2020-12-01 PROCEDURE — 80053 COMPREHEN METABOLIC PANEL: CPT | Performed by: EMERGENCY MEDICINE

## 2020-12-01 PROCEDURE — 83690 ASSAY OF LIPASE: CPT | Performed by: EMERGENCY MEDICINE

## 2020-12-01 PROCEDURE — 36415 COLL VENOUS BLD VENIPUNCTURE: CPT

## 2020-12-01 PROCEDURE — 99284 EMERGENCY DEPT VISIT MOD MDM: CPT

## 2020-12-02 LAB
ATRIAL RATE: 68 BPM
P AXIS: 68 DEGREES
PR INTERVAL: 176 MS
QRS AXIS: 83 DEGREES
QRSD INTERVAL: 98 MS
QT INTERVAL: 458 MS
QTC INTERVAL: 487 MS
T WAVE AXIS: 4 DEGREES
VENTRICULAR RATE: 68 BPM

## 2020-12-02 PROCEDURE — 93010 ELECTROCARDIOGRAM REPORT: CPT | Performed by: INTERNAL MEDICINE

## 2020-12-12 ENCOUNTER — HOSPITAL ENCOUNTER (EMERGENCY)
Facility: HOSPITAL | Age: 60
Discharge: HOME | End: 2020-12-12
Attending: EMERGENCY MEDICINE
Payer: COMMERCIAL

## 2020-12-12 VITALS
TEMPERATURE: 97.8 F | HEART RATE: 77 BPM | DIASTOLIC BLOOD PRESSURE: 72 MMHG | RESPIRATION RATE: 20 BRPM | WEIGHT: 135 LBS | BODY MASS INDEX: 18.28 KG/M2 | SYSTOLIC BLOOD PRESSURE: 151 MMHG | OXYGEN SATURATION: 100 % | HEIGHT: 72 IN

## 2020-12-12 DIAGNOSIS — R10.9 ABDOMINAL PAIN: Primary | ICD-10-CM

## 2020-12-12 DIAGNOSIS — R06.02 SHORTNESS OF BREATH: ICD-10-CM

## 2020-12-12 PROCEDURE — 99283 EMERGENCY DEPT VISIT LOW MDM: CPT

## 2020-12-12 PROCEDURE — 99284 EMERGENCY DEPT VISIT MOD MDM: CPT | Performed by: EMERGENCY MEDICINE

## 2021-02-09 DIAGNOSIS — I85.10 SECONDARY ESOPHAGEAL VARICES WITHOUT BLEEDING (HCC): ICD-10-CM

## 2021-02-09 RX ORDER — PROPRANOLOL HYDROCHLORIDE 20 MG/1
20 TABLET ORAL DAILY
Qty: 90 TABLET | Refills: 1 | OUTPATIENT
Start: 2021-02-09

## 2021-02-09 NOTE — TELEPHONE ENCOUNTER
Patient has been dismissed from our practice last year  Please deny Rx refill  I did call pharmacy and provided verbal notification

## 2022-06-10 ENCOUNTER — TELEPHONE (OUTPATIENT)
Dept: PAIN MEDICINE | Facility: CLINIC | Age: 62
End: 2022-06-10

## 2022-06-10 NOTE — TELEPHONE ENCOUNTER
S/W Pt about his records  Per Dr Naeem Ceballos    He didn't have anything to offer  I emailed him a list of other pain mgt